# Patient Record
Sex: MALE | Race: WHITE | NOT HISPANIC OR LATINO | Employment: FULL TIME | URBAN - METROPOLITAN AREA
[De-identification: names, ages, dates, MRNs, and addresses within clinical notes are randomized per-mention and may not be internally consistent; named-entity substitution may affect disease eponyms.]

---

## 2017-01-06 ENCOUNTER — OFFICE VISIT (OUTPATIENT)
Dept: INTERNAL MEDICINE | Facility: CLINIC | Age: 62
End: 2017-01-06
Payer: COMMERCIAL

## 2017-01-06 ENCOUNTER — HOSPITAL ENCOUNTER (OUTPATIENT)
Dept: RADIOLOGY | Facility: HOSPITAL | Age: 62
Discharge: HOME | End: 2017-01-06
Attending: FAMILY MEDICINE
Payer: COMMERCIAL

## 2017-01-06 ENCOUNTER — TELEPHONE (OUTPATIENT)
Dept: INTERNAL MEDICINE | Facility: CLINIC | Age: 62
End: 2017-01-06

## 2017-01-06 VITALS
DIASTOLIC BLOOD PRESSURE: 88 MMHG | HEART RATE: 88 BPM | SYSTOLIC BLOOD PRESSURE: 136 MMHG | BODY MASS INDEX: 29.75 KG/M2 | HEIGHT: 71 IN | OXYGEN SATURATION: 98 % | WEIGHT: 212.5 LBS | TEMPERATURE: 97 F

## 2017-01-06 DIAGNOSIS — R22.32 MASS OF SKIN OF SHOULDER, LEFT: ICD-10-CM

## 2017-01-06 DIAGNOSIS — R22.32 MASS OF SKIN OF SHOULDER, LEFT: Primary | ICD-10-CM

## 2017-01-06 PROCEDURE — 3079F DIAST BP 80-89 MM HG: CPT | Mod: S$GLB,,, | Performed by: PHYSICIAN ASSISTANT

## 2017-01-06 PROCEDURE — 99213 OFFICE O/P EST LOW 20 MIN: CPT | Mod: S$GLB,,, | Performed by: PHYSICIAN ASSISTANT

## 2017-01-06 PROCEDURE — 1159F MED LIST DOCD IN RCRD: CPT | Mod: S$GLB,,, | Performed by: PHYSICIAN ASSISTANT

## 2017-01-06 PROCEDURE — 76999 ECHO EXAMINATION PROCEDURE: CPT | Mod: TC,PO

## 2017-01-06 PROCEDURE — 76882 US LMTD JT/FCL EVL NVASC XTR: CPT | Mod: 26,,, | Performed by: RADIOLOGY

## 2017-01-06 PROCEDURE — 3075F SYST BP GE 130 - 139MM HG: CPT | Mod: S$GLB,,, | Performed by: PHYSICIAN ASSISTANT

## 2017-01-06 PROCEDURE — 99999 PR PBB SHADOW E&M-EST. PATIENT-LVL IV: CPT | Mod: PBBFAC,,, | Performed by: PHYSICIAN ASSISTANT

## 2017-01-06 RX ORDER — BENZONATATE 200 MG/1
200 CAPSULE ORAL 3 TIMES DAILY PRN
COMMUNITY
End: 2017-03-09

## 2017-01-06 RX ORDER — CEFDINIR 300 MG/1
300 CAPSULE ORAL 2 TIMES DAILY
COMMUNITY
End: 2017-03-09

## 2017-01-06 NOTE — MR AVS SNAPSHOT
[facility]  [address] [place] [address]  Phone: [phone]  Fax: [phone]                  Kike Smith   2017 9:20 AM   Office Visit    Description:  Male : 1955   Provider:  Dayami Benton PA-C   Department:  [facility]           Reason for Visit     Shoulder Pain           Diagnoses this Visit        Comments    Mass of skin of shoulder, left    -  Primary            To Do List           Future Appointments        Provider Department Dept Phone    2017 2:45 PM [facility] [phone]    2017 8:10 AM LABORATORY, [facility] [phone]    3/9/2017 10:00 AM Anmol Reyna MD [facility] [phone]      Goals (5 Years of Data)     None      Follow-Up and Disposition     Return if symptoms worsen or fail to improve.      [facility]     [facility] Care [facility] -  Assistance  Registered nurses in the [facility] provide clinical advisement, health education, appointment booking, and other advisory services.  Call for this free service at [phone].             Medications           Message regarding Medications     Verify the changes and/or additions to your medication regime listed below are the same as discussed with your clinician today.  If any of these changes or additions are incorrect, please notify your healthcare provider.        STOP taking these medications     PATANASE 0.6 % Spry 2 sprays by Nasal route once daily.            Verify that the below list of medications is an accurate representation of the medications you are currently taking.  If none reported, the list may be blank. If incorrect, please contact your healthcare provider. Carry this list with you in case of emergency.           Current Medications     aspirin 81 MG Chew Take 81 mg by mouth once daily.    azelastine (ASTELIN) 137 mcg (0.1 %) nasal spray 2  "sprays 2 (two) times daily.    benzonatate (TESSALON) 200 MG capsule Take 200 mg by mouth 3 (three) times daily as needed for Cough.    cefdinir (OMNICEF) 300 MG capsule Take 300 mg by mouth 2 (two) times daily.    ciprofloxacin-dexamethasone 0.3-0.1% (CIPRODEX) 0.3-0.1 % DrpS Place 4 drops into the left ear 2 (two) times daily.    fexofenadine (ALLEGRA) 30 MG tablet Take 30 mg by mouth 2 (two) times daily.    fluticasone (FLONASE) 50 mcg/actuation nasal spray instill 2 sprays into each nostril once daily    hydrochlorothiazide (HYDRODIURIL) 25 MG tablet take 1 tablet by mouth once daily           Clinical Reference Information           Vital Signs - Last Recorded  Most recent update: 1/6/2017  9:06 AM by Shahram Lyman LPN    BP Pulse Temp Ht    136/88 (BP Location: Right arm, Patient Position: Sitting, BP Method: Manual) 88 97.3 °F (36.3 °C) (Tympanic) 5' 11" (1.803 m)    Wt SpO2 BMI    96.4 kg (212 lb 8.4 oz) 98% 29.64 kg/m2      Blood Pressure          Most Recent Value    BP  136/88      Allergies as of 1/6/2017     No Known Allergies      Immunizations Administered on Date of Encounter - 1/6/2017     None      Orders Placed During Today's Visit     Future Labs/Procedures Expected by Expires    [facility]  1/6/2017 1/6/2018      Instructions      Lipoma, No Treatment  A lipoma is a local overgrowth of fatty tissue. It appears as a soft raised area, usually less than 2 inches across. It is a benign condition (not cancer).   Home care  General information regarding lipoma includes:  1. No special care is needed for a lipoma.  2. You can consider removal for cosmetic reasons.  3. Sometimes lipomas are uncomfortable because they put pressure on surrounding tissues. This is also a reason to have a lipoma removed.  Follow-up care  Follow up with your healthcare provider, or as advised if you want to have the lipoma removed at a later time.  When to seek medical advice  Call your healthcare provider " right away if any of the following occur:  · Redness, pain, tenderness, or drainage from the lipoma  · Lipoma begins to enlarge or change shape  · Changes in the color of the skin over the lipoma  © 8308-6688 [employer]. [address] [place] [address]. All rights reserved. This information is not intended as a substitute for professional medical care. Always follow your healthcare professional's instructions.        Understanding a Lipoma    A lipoma is a benign lump under the skin thats made of fat. Its not cancer. It feels soft like rubber when you press it, and in most cases it doesnt hurt. Some people have more than one. A lipoma grows slowly over time and doesnt cause many problems. Lipomas occur most often in adults from ages 40 to 60, and more often in men.  How to say it  Ly-POH-sofiya   What causes a lipoma?  The cause is not yet known. Experts are still learning more. It may be partly caused by a problem in a gene. They can run in families. Familial multiple lipomatosis is when 2 or more family members have many lipomas.  Symptoms of a lipoma  The main symptom of a lipoma is a soft lump under the skin that doesnt hurt unless it is pressing on a nerve. It may be small, around 1/4 inch across. Or it may be larger, up to 4 inches across or more.  There are different kinds of lipomas. The most common kind occurs under the skin of the shoulders, chest, back, belly, or under the arms. In some cases, a lipoma can occur on the legs. In rare cases, one may occur deeper in the body or in a muscle.  Treatment for a lipoma  In most cases, a lipoma doesnt need treatment. Your healthcare provider may look at it during regular checkups to see if it changes.  But if the lipoma is painful or you want it removed for cosmetic reasons, it can be removed with surgery. The surgery is called excision. The lipoma will most likely not grow back after surgery. During surgery, the area around the lipoma  is numbed. If you have a deep lipoma, you may need medicine called regional anesthesia to numb a larger area. Or you may need medicine called general anesthesia to put you to sleep during the procedure. Then the doctor makes a cut over the area of the lipoma. He or she removes the lump of fat. The cut is then closed with stitches.  Possible complications of a lipoma  A large lipoma inside the body can press on organs, nerves, or other tissues and cause problems. For example, it can cause problems with breathing or digestion.  Living with a lipoma  Your healthcare provider may look at the lipoma during regular checkups to see if it changes or is causing problems.  When to call your healthcare provider  Call your healthcare provider right away if you have any of these:  · Lipoma that grows quickly, causes pain, or feels hard  · Growth of new lipomas   © 0031-1131 [employer]. [address] [place] [address], [place] [address]. All rights reserved. This information is not intended as a substitute for professional medical care. Always follow your healthcare professional's instructions.

## 2017-01-06 NOTE — TELEPHONE ENCOUNTER
----- Message from Yvonne Gomez sent at 1/6/2017  4:15 PM CST -----  u/s results needed..[phone] (home)

## 2017-01-06 NOTE — PATIENT INSTRUCTIONS
Lipoma, No Treatment  A lipoma is a local overgrowth of fatty tissue. It appears as a soft raised area, usually less than 2 inches across. It is a benign condition (not cancer).   Home care  General information regarding lipoma includes:  1. No special care is needed for a lipoma.  2. You can consider removal for cosmetic reasons.  3. Sometimes lipomas are uncomfortable because they put pressure on surrounding tissues. This is also a reason to have a lipoma removed.  Follow-up care  Follow up with your healthcare provider, or as advised if you want to have the lipoma removed at a later time.  When to seek medical advice  Call your healthcare provider right away if any of the following occur:  · Redness, pain, tenderness, or drainage from the lipoma  · Lipoma begins to enlarge or change shape  · Changes in the color of the skin over the lipoma  © 2647-9082 [employer]. [address] [place] [address]. All rights reserved. This information is not intended as a substitute for professional medical care. Always follow your healthcare professional's instructions.        Understanding a Lipoma    A lipoma is a benign lump under the skin thats made of fat. Its not cancer. It feels soft like rubber when you press it, and in most cases it doesnt hurt. Some people have more than one. A lipoma grows slowly over time and doesnt cause many problems. Lipomas occur most often in adults from ages 40 to 60, and more often in men.  How to say it  Ly-POH-sofiya   What causes a lipoma?  The cause is not yet known. Experts are still learning more. It may be partly caused by a problem in a gene. They can run in families. Familial multiple lipomatosis is when 2 or more family members have many lipomas.  Symptoms of a lipoma  The main symptom of a lipoma is a soft lump under the skin that doesnt hurt unless it is pressing on a nerve. It may be small, around 1/4 inch across. Or it may be larger, up to 4 inches  across or more.  There are different kinds of lipomas. The most common kind occurs under the skin of the shoulders, chest, back, belly, or under the arms. In some cases, a lipoma can occur on the legs. In rare cases, one may occur deeper in the body or in a muscle.  Treatment for a lipoma  In most cases, a lipoma doesnt need treatment. Your healthcare provider may look at it during regular checkups to see if it changes.  But if the lipoma is painful or you want it removed for cosmetic reasons, it can be removed with surgery. The surgery is called excision. The lipoma will most likely not grow back after surgery. During surgery, the area around the lipoma is numbed. If you have a deep lipoma, you may need medicine called regional anesthesia to numb a larger area. Or you may need medicine called general anesthesia to put you to sleep during the procedure. Then the doctor makes a cut over the area of the lipoma. He or she removes the lump of fat. The cut is then closed with stitches.  Possible complications of a lipoma  A large lipoma inside the body can press on organs, nerves, or other tissues and cause problems. For example, it can cause problems with breathing or digestion.  Living with a lipoma  Your healthcare provider may look at the lipoma during regular checkups to see if it changes or is causing problems.  When to call your healthcare provider  Call your healthcare provider right away if you have any of these:  · Lipoma that grows quickly, causes pain, or feels hard  · Growth of new lipomas   © 9797-5732 [employer]. [address] [place] [address], [place] [address]. All rights reserved. This information is not intended as a substitute for professional medical care. Always follow your healthcare professional's instructions.

## 2017-01-06 NOTE — PROGRESS NOTES
"  Subjective:      Patient ID: Kike Smith is a 61 y.o. male.    Chief Complaint: Shoulder Pain (Left shoulder with swelling on shoulder bone (movable with touch))    Head and Neck Mass:   Chronicity:  New  Onset:  Yesterday  Progression since onset:  Unchanged  Mass characteristics:  Soft and mobileNo sore throat, no chills, no ear pain, no fever, no headaches, no dysphasia, no hemoptysis, no myalgias, no nasal congestion, no postnasal drip, no hoarse voice, no immunosuppression, no shortness of breath, no sore throat, no sweats, no weight loss and no infection.Aggravated by:  Nothing  Treatments tried:  NothingNo asthma, no bronchiectasis, no bronchitis, no COPD, no emphysema, no environmental allergies and no pneumonia.      Review of Systems   Constitutional: Negative for chills, fever and weight loss.   HENT: Negative for ear pain, hoarse voice, postnasal drip and sore throat.    Respiratory: Negative for hemoptysis and shortness of breath.    Musculoskeletal: Negative for myalgias.   Allergic/Immunologic: Negative for environmental allergies.   Neurological: Negative for headaches.     Objective:     Visit Vitals    /88 (BP Location: Right arm, Patient Position: Sitting, BP Method: Manual)    Pulse 88    Temp 97.3 °F (36.3 °C) (Tympanic)    Ht 5' 11" (1.803 m)    Wt 96.4 kg (212 lb 8.4 oz)    SpO2 98%    BMI 29.64 kg/m2       Physical Exam   Constitutional: He appears well-developed and well-nourished. No distress.   Cardiovascular: Normal rate, regular rhythm and normal heart sounds.  Exam reveals no gallop and no friction rub.    No murmur heard.  Pulmonary/Chest: Effort normal and breath sounds normal. No respiratory distress. He has no wheezes. He has no rales.   Musculoskeletal:        Left shoulder: He exhibits normal range of motion, no tenderness, no bony tenderness, no swelling, no effusion, no crepitus, no deformity, no laceration, no pain, no spasm, normal pulse and normal strength. "        Cervical back: Normal. He exhibits normal range of motion, no tenderness, no bony tenderness and no swelling.        Arms:  Skin: Skin is warm. No rash noted. He is not diaphoretic. No erythema.   Psychiatric: He has a normal mood and affect. His behavior is normal. Judgment and thought content normal.   Nursing note and vitals reviewed.      Assessment:     1. Mass of skin of shoulder, left      Plan:   Mass of skin of shoulder, left  -     US Soft Tissue Misc; Future; Expected date: 1/6/17    -MOST LIKELY A LIPOMA. A handout on this was sent home with patient today.     Return if symptoms worsen or fail to improve.

## 2017-01-06 NOTE — TELEPHONE ENCOUNTER
Notified patient of results, medication, and recommendations. Patient verbalized understanding.//ddw

## 2017-02-20 ENCOUNTER — LAB VISIT (OUTPATIENT)
Dept: LAB | Facility: HOSPITAL | Age: 62
End: 2017-02-20
Attending: FAMILY MEDICINE
Payer: COMMERCIAL

## 2017-02-20 DIAGNOSIS — I10 ESSENTIAL HYPERTENSION: ICD-10-CM

## 2017-02-20 DIAGNOSIS — Z12.5 SCREENING FOR PROSTATE CANCER: ICD-10-CM

## 2017-02-20 LAB
ANION GAP SERPL CALC-SCNC: 4 MMOL/L
BUN SERPL-MCNC: 23 MG/DL
CALCIUM SERPL-MCNC: 9.3 MG/DL
CHLORIDE SERPL-SCNC: 103 MMOL/L
CHOLEST/HDLC SERPL: 4.8 {RATIO}
CO2 SERPL-SCNC: 32 MMOL/L
COMPLEXED PSA SERPL-MCNC: 3.7 NG/ML
CREAT SERPL-MCNC: 1 MG/DL
EST. GFR  (AFRICAN AMERICAN): >60 ML/MIN/1.73 M^2
EST. GFR  (NON AFRICAN AMERICAN): >60 ML/MIN/1.73 M^2
GLUCOSE SERPL-MCNC: 86 MG/DL
HDL/CHOLESTEROL RATIO: 20.8 %
HDLC SERPL-MCNC: 178 MG/DL
HDLC SERPL-MCNC: 37 MG/DL
LDLC SERPL CALC-MCNC: 127.4 MG/DL
NONHDLC SERPL-MCNC: 141 MG/DL
POTASSIUM SERPL-SCNC: 4.2 MMOL/L
SODIUM SERPL-SCNC: 139 MMOL/L
TRIGL SERPL-MCNC: 68 MG/DL

## 2017-02-20 PROCEDURE — 84153 ASSAY OF PSA TOTAL: CPT

## 2017-02-20 PROCEDURE — 36415 COLL VENOUS BLD VENIPUNCTURE: CPT | Mod: PO

## 2017-02-20 PROCEDURE — 80061 LIPID PANEL: CPT

## 2017-02-20 PROCEDURE — 80048 BASIC METABOLIC PNL TOTAL CA: CPT

## 2017-03-09 ENCOUNTER — OFFICE VISIT (OUTPATIENT)
Dept: INTERNAL MEDICINE | Facility: CLINIC | Age: 62
End: 2017-03-09
Payer: COMMERCIAL

## 2017-03-09 VITALS
HEART RATE: 67 BPM | BODY MASS INDEX: 29.91 KG/M2 | WEIGHT: 213.63 LBS | DIASTOLIC BLOOD PRESSURE: 86 MMHG | HEIGHT: 71 IN | OXYGEN SATURATION: 99 % | SYSTOLIC BLOOD PRESSURE: 122 MMHG | TEMPERATURE: 97 F

## 2017-03-09 DIAGNOSIS — Z00.00 ROUTINE HEALTH MAINTENANCE: Primary | ICD-10-CM

## 2017-03-09 DIAGNOSIS — R31.9 HEMATURIA: ICD-10-CM

## 2017-03-09 DIAGNOSIS — Z12.5 SCREENING FOR PROSTATE CANCER: ICD-10-CM

## 2017-03-09 DIAGNOSIS — I10 ESSENTIAL HYPERTENSION: ICD-10-CM

## 2017-03-09 PROCEDURE — 90471 IMMUNIZATION ADMIN: CPT | Mod: S$GLB,,, | Performed by: FAMILY MEDICINE

## 2017-03-09 PROCEDURE — 3079F DIAST BP 80-89 MM HG: CPT | Mod: S$GLB,,, | Performed by: FAMILY MEDICINE

## 2017-03-09 PROCEDURE — 90736 HZV VACCINE LIVE SUBQ: CPT | Mod: S$GLB,,, | Performed by: FAMILY MEDICINE

## 2017-03-09 PROCEDURE — 99396 PREV VISIT EST AGE 40-64: CPT | Mod: 25,S$GLB,, | Performed by: FAMILY MEDICINE

## 2017-03-09 PROCEDURE — 3074F SYST BP LT 130 MM HG: CPT | Mod: S$GLB,,, | Performed by: FAMILY MEDICINE

## 2017-03-09 PROCEDURE — 99999 PR PBB SHADOW E&M-EST. PATIENT-LVL III: CPT | Mod: PBBFAC,,, | Performed by: FAMILY MEDICINE

## 2017-03-09 RX ORDER — HYDROCHLOROTHIAZIDE 25 MG/1
25 TABLET ORAL DAILY
Qty: 90 TABLET | Refills: 3 | Status: SHIPPED | OUTPATIENT
Start: 2017-03-09 | End: 2018-05-18 | Stop reason: SDUPTHER

## 2017-03-09 NOTE — MR AVS SNAPSHOT
[facility]  [address]  [place] [address]  Phone: [phone]  Fax: [phone]                  Kike Smith   3/9/2017 10:00 AM   Office Visit    Description:  Male : 1955   Provider:  Anmol Reyna MD   Department:  [facility]           Diagnoses this Visit        Comments    Routine health maintenance    -  Primary     Essential hypertension         Screening for prostate cancer         Hematuria                To Do List           Future Appointments        Provider Department Dept Phone    2017 9:30 AM LAB[facility]ATORY, [facility] [phone]    2017 9:40 AM SPECIMEN, [facility] [phone]    3/2/2018 7:30 AM LAB[facility]ATORY, [facility] [phone]    3/2/2018 7:40 AM SPECIMEN, [facility] [phone]      Goals (5 Years of Data)     None       These Medications        Disp Refills Start End    hydrochlorothiazide (HYDRODIURIL) 25 MG tablet 90 tablet 3 3/9/2017     Take 1 tablet (25 mg total) by mouth once daily. - Oral    Pharmacy: [facility]  DANIELLE VILLANUEVA [facility]  #: [phone]         [facility]     [facility] -  Assistance  Registered nurses in the [facility] provide clinical advisement, health education, appointment booking, and other advisory services.  Call for this free service at [phone].             Medications           Message regarding Medications     Verify the changes and/or additions to your medication regime listed below are the same as discussed with your clinician today.  If any of these changes or additions are incorrect, please notify your healthcare provider.        CHANGE how you are taking these medications     Start Taking Instead of    hydrochlorothiazide (HYDRODIURIL) 25 MG tablet hydrochlorothiazide (HYDRODIURIL) 25 MG tablet     "Dosage:  Take 1 tablet (25 mg total) by mouth once daily. Dosage:  take 1 tablet by mouth once daily    Reason for Change:  Reorder       STOP taking these medications     ciprofloxacin-dexamethasone 0.3-0.1% (CIPRODEX) 0.3-0.1 % DrpS Place 4 drops into the left ear 2 (two) times daily.    benzonatate (TESSALON) 200 MG capsule Take 200 mg by mouth 3 (three) times daily as needed for Cough.    cefdinir (OMNICEF) 300 MG capsule Take 300 mg by mouth 2 (two) times daily.           Verify that the below list of medications is an accurate representation of the medications you are currently taking.  If none reported, the list may be blank. If incorrect, please contact your healthcare provider. Carry this list with you in case of emergency.           Current Medications     aspirin 81 MG Chew Take 81 mg by mouth once daily.    azelastine (ASTELIN) 137 mcg (0.1 %) nasal spray 2 sprays 2 (two) times daily.    fexofenadine (ALLEGRA) 30 MG tablet Take 30 mg by mouth 2 (two) times daily.    fluticasone (FLONASE) 50 mcg/actuation nasal spray instill 2 sprays into each nostril once daily    hydrochlorothiazide (HYDRODIURIL) 25 MG tablet Take 1 tablet (25 mg total) by mouth once daily.           Clinical Reference Information           Your Vitals Were     BP Pulse Temp Height    122/86 (BP Location: Right arm, Patient Position: Sitting, BP Method: Manual) 67 97.2 °F (36.2 °C) (Tympanic) 5' 10.5" (1.791 m)    Weight SpO2 BMI    96.9 kg (213 lb 10 oz) 99% 30.22 kg/m2      Blood Pressure          Most Recent Value    BP  122/86      Allergies as of 3/9/2017     No Known Allergies      Immunizations Administered on Date of Encounter - 3/9/2017     Name Date Dose VIS Date Route    Zoster  Incomplete 0.65 mL 10/6/2009 Subcutaneous      Orders Placed During Today's Visit      Normal Orders This Visit    Zoster Vaccine - Live     Future Labs/Procedures Expected by Expires    PSA, Screening  9/5/2017 3/9/2018    Urinalysis  9/5/2017 " 5/8/2018    Basic metabolic panel  3/9/2018 3/10/2018    Lipid panel  3/9/2018 3/9/2018    PSA, Screening  3/9/2018 3/10/2018    Urinalysis  3/9/2018 5/8/2018      Language Assistance Services     ATTENTION: Language assistance services are available, free of charge. Please call [phone].      ATENCIÓN: Si habla español, tiene a ashley disposición servicios gratuitos de asistencia lingüística. Llame al [phone].     CHÚ Ý: N?u b?n nói Ti?ng Vi?t, có các d?ch v? h? tr? ngôn ng? mi?n phí dành cho b?n. G?i s? [phone].         [facility] complies with applicable Federal civil rights laws and does not discriminate on the basis of race, color, national origin, age, disability, or sex.

## 2017-03-09 NOTE — PROGRESS NOTES
Subjective:       Patient ID: Kike Smith is a  male.    Chief Complaint: Annual Exam    HPIhere for phys exam no c/o;htn controlled and nl bps;d/w psa bit up again (had sex w/in a day of) hx intermitt borderline elev in past; asympt no bph symp    fam hx bladder ca/hematuria nl w./u(brother bladder ca /smoker) : nl hmaturia w/u yrs ago. Would like to monitor for inc hematuria. No gross hematuria    Past Medical History   Diagnosis Date    Hypertension     Benign hematuria      s/p urol/cysto     History reviewed. No pertinent past surgical history.  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         Review of Systems  Cardiovascular: no chest pain  Chest: no shortness of breath  Abd: no abd pain  Remainder review of systems negative    Objective:      Physical Exam   Constitutional: He is oriented to person, place, and time. He appears well-developed and well-nourished.   HENT:   Head: Normocephalic and atraumatic.   Right Ear: External ear normal.   Left Ear: External ear normal.   Nose: Nose normal.   Mouth/Throat: Oropharynx is clear and moist.   Nl tms   Eyes: Conjunctivae and EOM are normal. Pupils are equal, round, and reactive to light. No scleral icterus.   Neck: Normal range of motion. Neck supple. Carotid bruit is not present.   Cardiovascular: Normal rate, regular rhythm and normal heart sounds.  Exam reveals no gallop and no friction rub.    No murmur  heard.  Pulmonary/Chest: Effort normal and breath sounds normal. He has no wheezes.   Abdominal: Soft. Bowel sounds are normal. He exhibits no distension and no mass. There is no hepatosplenomegaly. There is no tenderness. There is no rebound and no guarding.   Musculoskeletal: Normal range of motion. He exhibits no tenderness.   Lymphadenopathy:     He has no cervical adenopathy.   Neurological: He is alert and oriented to person, place, and time. No cranial nerve deficit. Coordination normal.   Skin: Skin is warm and dry. No rash noted. No erythema.   Psychiatric: He has a normal mood and affect. His behavior is normal. Judgment and thought content normal.   Nursing note and vitals reviewed.  gu nl test. No hernia    haider prost mild enlarg no tend/boggin; no nodules  Assessment:       1. Routine health maintenance      elev psa veloc  fam hx bladder ca/hematuria nl w./u  Plan:       psa ua 6m   Lab 12 months and follow up after  Routine health maintenance    Essential hypertension  -     Lipid panel; Future; Expected date: 3/9/18  -     Basic metabolic panel; Future; Expected date: 3/9/18    Screening for prostate cancer  -     PSA, Screening; Future; Expected date: 3/9/18  -     PSA, Screening; Future; Expected date: 9/5/17    Hematuria  -     Urinalysis; Future; Expected date: 9/5/17  -     Urinalysis; Future; Expected date: 3/9/18    Other orders  -     hydrochlorothiazide (HYDRODIURIL) 25 MG tablet; Take 1 tablet (25 mg total) by mouth once daily.  Dispense: 90 tablet; Refill: 3  -     Zoster Vaccine - Live

## 2017-03-24 NOTE — TELEPHONE ENCOUNTER
----- Message from Patricia Welch sent at 3/24/2017 11:01 AM CDT -----  Contact: pt  Patient needs a refill on ciprodex otic suspenion/ear drop called into [facility] pharmacy at [facility].  Please call patient at [phone], if you have any questions. Thanks!

## 2017-03-27 RX ORDER — CIPROFLOXACIN AND DEXAMETHASONE 3; 1 MG/ML; MG/ML
SUSPENSION/ DROPS AURICULAR (OTIC)
Qty: 7.5 ML | Refills: 5
Start: 2017-03-27

## 2017-03-27 RX ORDER — CIPROFLOXACIN AND DEXAMETHASONE 3; 1 MG/ML; MG/ML
4 SUSPENSION/ DROPS AURICULAR (OTIC) 2 TIMES DAILY
Qty: 7.5 ML | Refills: 5
Start: 2017-03-27

## 2017-07-03 ENCOUNTER — OFFICE VISIT (OUTPATIENT)
Dept: INTERNAL MEDICINE | Facility: CLINIC | Age: 62
End: 2017-07-03
Payer: COMMERCIAL

## 2017-07-03 VITALS
HEART RATE: 73 BPM | HEIGHT: 71 IN | WEIGHT: 213.38 LBS | OXYGEN SATURATION: 95 % | DIASTOLIC BLOOD PRESSURE: 72 MMHG | TEMPERATURE: 98 F | SYSTOLIC BLOOD PRESSURE: 120 MMHG | BODY MASS INDEX: 29.87 KG/M2

## 2017-07-03 DIAGNOSIS — N45.1 EPIDIDYMITIS: Primary | ICD-10-CM

## 2017-07-03 PROCEDURE — 99214 OFFICE O/P EST MOD 30 MIN: CPT | Mod: S$GLB,,, | Performed by: PEDIATRICS

## 2017-07-03 PROCEDURE — 99999 PR PBB SHADOW E&M-EST. PATIENT-LVL III: CPT | Mod: PBBFAC,,, | Performed by: PEDIATRICS

## 2017-07-03 RX ORDER — DOXYCYCLINE 100 MG/1
100 CAPSULE ORAL 2 TIMES DAILY
Qty: 20 CAPSULE | Refills: 0 | Status: SHIPPED | OUTPATIENT
Start: 2017-07-03 | End: 2017-07-13

## 2017-07-03 NOTE — PROGRESS NOTES
Subjective:       Patient ID: Kike Smith is a 61 y.o. male.    Chief Complaint: Testicle Pain (ache)    Intermittent with testicular pain(more as ache) and swelling. Last month it has been symptomatic. He can sometimes feel right testicular mass, now can. US many years ago showed epididymal cyst. No dysuria, drip, urinary frequency, color. Has hx of followed PSA and urine.      Testicle Pain   The patient's primary symptoms include testicular pain. The patient's pertinent negatives include no penile pain or scrotal swelling. Pertinent negatives include no chest pain, constipation, diarrhea, dysuria, flank pain, frequency, headaches, urgency or vomiting.     Review of Systems   Constitutional: Negative for activity change and unexpected weight change.   HENT: Negative for hearing loss, rhinorrhea and trouble swallowing.    Eyes: Negative for discharge and visual disturbance.   Respiratory: Negative for chest tightness and wheezing.    Cardiovascular: Negative for chest pain and palpitations.   Gastrointestinal: Negative for blood in stool, constipation, diarrhea and vomiting.   Endocrine: Negative for polydipsia and polyuria.   Genitourinary: Positive for testicular pain. Negative for difficulty urinating, dysuria, flank pain, frequency, genital sores, hematuria, penile pain, penile swelling, scrotal swelling and urgency.   Musculoskeletal: Negative for arthralgias, joint swelling and neck pain.   Neurological: Negative for weakness and headaches.   Psychiatric/Behavioral: Negative for confusion and dysphoric mood.       Objective:      Physical Exam   Constitutional: He is oriented to person, place, and time. He appears well-developed and well-nourished. No distress.   Cardiovascular: Normal rate, regular rhythm and normal heart sounds.    No murmur heard.  Pulmonary/Chest: Effort normal and breath sounds normal. No respiratory distress. He has no wheezes. He has no rales.   Abdominal: Soft. He exhibits no  distension and no mass. There is no tenderness. There is no rebound and no guarding.   Genitourinary: Penis normal. No penile tenderness (both testicles normal, swelling of right epidydimus, no descrete mass, minimal tenderness.).   Musculoskeletal: He exhibits no edema.   Neurological: He is alert and oriented to person, place, and time. No cranial nerve deficit. Coordination normal.   Psychiatric: He has a normal mood and affect. His behavior is normal. Judgment and thought content normal.       Assessment:       1. Epididymitis        Plan:       Epididymitis  -     Urinalysis; Future; Expected date: 07/03/2017  -     Urine culture; Future; Expected date: 07/03/2017  -     US Scrotum And Testicles; Future; Expected date: 07/03/2017  -     doxycycline (MONODOX) 100 MG capsule; Take 1 capsule (100 mg total) by mouth 2 (two) times daily.  Dispense: 20 capsule; Refill: 0    he has mild asymmetry of right and left side. He is going out of town, will start doxy and get urine and us. F/U based on finding.

## 2017-07-07 ENCOUNTER — TELEPHONE (OUTPATIENT)
Dept: RADIOLOGY | Facility: HOSPITAL | Age: 62
End: 2017-07-07

## 2017-07-10 ENCOUNTER — HOSPITAL ENCOUNTER (OUTPATIENT)
Dept: RADIOLOGY | Facility: HOSPITAL | Age: 62
Discharge: HOME | End: 2017-07-10
Attending: PEDIATRICS
Payer: COMMERCIAL

## 2017-07-10 ENCOUNTER — PATIENT MESSAGE (OUTPATIENT)
Dept: INTERNAL MEDICINE | Facility: CLINIC | Age: 62
End: 2017-07-10

## 2017-07-10 DIAGNOSIS — N45.1 EPIDIDYMITIS: ICD-10-CM

## 2017-07-10 PROCEDURE — 76870 US EXAM SCROTUM: CPT | Mod: 26,,, | Performed by: RADIOLOGY

## 2017-07-10 PROCEDURE — 76870 US EXAM SCROTUM: CPT | Mod: TC,PO

## 2017-09-20 ENCOUNTER — LAB VISIT (OUTPATIENT)
Dept: LAB | Facility: HOSPITAL | Age: 62
End: 2017-09-20
Attending: FAMILY MEDICINE
Payer: COMMERCIAL

## 2017-09-20 DIAGNOSIS — R31.9 HEMATURIA: ICD-10-CM

## 2017-09-20 LAB
BILIRUB UR QL STRIP: NEGATIVE
CLARITY UR: CLEAR
COLOR UR: YELLOW
GLUCOSE UR QL STRIP: NEGATIVE
HGB UR QL STRIP: ABNORMAL
KETONES UR QL STRIP: NEGATIVE
LEUKOCYTE ESTERASE UR QL STRIP: NEGATIVE
MICROSCOPIC COMMENT: ABNORMAL
NITRITE UR QL STRIP: NEGATIVE
PH UR STRIP: 8 [PH] (ref 5–8)
PROT UR QL STRIP: ABNORMAL
RBC #/AREA URNS HPF: 15 /HPF (ref 0–4)
SP GR UR STRIP: 1.01 (ref 1–1.03)
URN SPEC COLLECT METH UR: ABNORMAL

## 2017-09-20 PROCEDURE — 81000 URINALYSIS NONAUTO W/SCOPE: CPT | Mod: PO

## 2017-09-21 ENCOUNTER — PATIENT MESSAGE (OUTPATIENT)
Dept: INTERNAL MEDICINE | Facility: CLINIC | Age: 62
End: 2017-09-21

## 2017-11-02 ENCOUNTER — LAB VISIT (OUTPATIENT)
Dept: LAB | Facility: HOSPITAL | Age: 62
End: 2017-11-02
Attending: PEDIATRICS
Payer: COMMERCIAL

## 2017-11-02 ENCOUNTER — OFFICE VISIT (OUTPATIENT)
Dept: OPHTHALMOLOGY | Facility: CLINIC | Age: 62
End: 2017-11-02
Payer: COMMERCIAL

## 2017-11-02 ENCOUNTER — OFFICE VISIT (OUTPATIENT)
Dept: INTERNAL MEDICINE | Facility: CLINIC | Age: 62
End: 2017-11-02
Payer: COMMERCIAL

## 2017-11-02 VITALS
WEIGHT: 210.31 LBS | TEMPERATURE: 98 F | HEIGHT: 71 IN | DIASTOLIC BLOOD PRESSURE: 84 MMHG | SYSTOLIC BLOOD PRESSURE: 110 MMHG | OXYGEN SATURATION: 97 % | HEART RATE: 76 BPM | BODY MASS INDEX: 29.44 KG/M2

## 2017-11-02 DIAGNOSIS — N50.3 EPIDIDYMAL CYST: ICD-10-CM

## 2017-11-02 DIAGNOSIS — H52.7 REFRACTIVE ERROR: ICD-10-CM

## 2017-11-02 DIAGNOSIS — Z13.5 GLAUCOMA SCREENING: ICD-10-CM

## 2017-11-02 DIAGNOSIS — Z01.00 VISIT FOR EYE AND VISION EXAM: Primary | ICD-10-CM

## 2017-11-02 PROCEDURE — 92014 COMPRE OPH EXAM EST PT 1/>: CPT | Mod: S$GLB,,, | Performed by: OPTOMETRIST

## 2017-11-02 PROCEDURE — 99999 PR PBB SHADOW E&M-EST. PATIENT-LVL III: CPT | Mod: PBBFAC,,, | Performed by: PEDIATRICS

## 2017-11-02 PROCEDURE — 99213 OFFICE O/P EST LOW 20 MIN: CPT | Mod: S$GLB,,, | Performed by: PEDIATRICS

## 2017-11-02 PROCEDURE — 92015 DETERMINE REFRACTIVE STATE: CPT | Mod: S$GLB,,, | Performed by: OPTOMETRIST

## 2017-11-02 PROCEDURE — 99999 PR PBB SHADOW E&M-EST. PATIENT-LVL I: CPT | Mod: PBBFAC,,, | Performed by: OPTOMETRIST

## 2017-11-02 PROCEDURE — 87591 N.GONORRHOEAE DNA AMP PROB: CPT

## 2017-11-02 RX ORDER — DOXYCYCLINE 100 MG/1
100 CAPSULE ORAL 2 TIMES DAILY
Qty: 42 CAPSULE | Refills: 0 | Status: SHIPPED | OUTPATIENT
Start: 2017-11-02 | End: 2017-11-23

## 2017-11-02 RX ORDER — CIPROFLOXACIN AND DEXAMETHASONE 3; 1 MG/ML; MG/ML
SUSPENSION/ DROPS AURICULAR (OTIC)
Refills: 0 | COMMUNITY
Start: 2017-09-28 | End: 2018-06-28

## 2017-11-02 NOTE — PROGRESS NOTES
HPI     Last [facility] exam 09/17/2015  Hx of Posterior vitreous detachment, OD  Hx of floaters in visual field, OD  Screening for glaucoma  RE  Update eyeglass Rx  Does not wear CL's anymore      Last edited by Fredy Eric MA on 11/2/2017  8:56 AM. (History)            Assessment /Plan     For exam results, see Encounter Report.    Visit for eye and vision exam    Glaucoma screening    Refractive error      OH OK OU.  Spec Rx given.  RTC one year.

## 2017-11-02 NOTE — PROGRESS NOTES
Subjective:       Patient ID: Kike Smith is a 62 y.o. male.    Chief Complaint: Testicle Pain    In July, doxy took care of groin pain. Returned about 2 weeks ago at time of constipation. Right groin, vague and intermittent. No fever, CVA tenderness, dysuria, dribbling, hematuria(has hx of hematuria with negative CT and cystoscope and family bladder cancer). No infidelities. W/U in past reviewed with patient.      Review of Systems   Constitutional: Negative for fever and unexpected weight change.   HENT: Negative for congestion and rhinorrhea.    Eyes: Negative for discharge and redness.   Respiratory: Negative for cough and wheezing.    Cardiovascular: Negative for chest pain, palpitations and leg swelling.   Gastrointestinal: Negative for abdominal pain, anal bleeding, constipation, diarrhea, nausea and vomiting.   Endocrine: Negative for cold intolerance, heat intolerance, polydipsia, polyphagia and polyuria.   Genitourinary: Positive for testicular pain. Negative for decreased urine volume, difficulty urinating, discharge, dysuria, enuresis, flank pain, frequency, genital sores, hematuria, penile pain, penile swelling, scrotal swelling and urgency.   Musculoskeletal: Negative for arthralgias and joint swelling.   Skin: Negative for rash and wound.   Neurological: Negative for syncope and headaches.   Psychiatric/Behavioral: Negative for behavioral problems and sleep disturbance.       Objective:      Physical Exam   Constitutional: He is oriented to person, place, and time. He appears well-developed and well-nourished. No distress.   Abdominal: Soft. He exhibits no distension and no mass. There is no tenderness. There is no rebound and no guarding.   Genitourinary: Penis normal.   Genitourinary Comments: No hernia, no testicular tenderness, small right cyst palpated.   Neurological: He is alert and oriented to person, place, and time. No cranial nerve deficit. Coordination normal.   Psychiatric: He has a  normal mood and affect. His behavior is normal. Judgment and thought content normal.       Assessment:       1. Epididymal cyst        Plan:       Epididymal cyst  -     Urinalysis; Future; Expected date: 11/02/2017  -     Urine culture; Future; Expected date: 11/02/2017  -     C. trachomatis/N. gonorrhoeae by AMP DNA Urine; Future; Expected date: 11/16/2017  -     Ambulatory referral to Urology    Other orders  -     doxycycline (VIBRAMYCIN) 100 MG Cap; Take 1 capsule (100 mg total) by mouth 2 (two) times daily.  Dispense: 42 capsule; Refill: 0

## 2017-11-03 LAB
C TRACH DNA SPEC QL NAA+PROBE: NOT DETECTED
N GONORRHOEA DNA SPEC QL NAA+PROBE: NOT DETECTED

## 2017-12-04 ENCOUNTER — TELEPHONE (OUTPATIENT)
Dept: UROLOGY | Facility: CLINIC | Age: 62
End: 2017-12-04

## 2017-12-04 ENCOUNTER — OFFICE VISIT (OUTPATIENT)
Dept: UROLOGY | Facility: CLINIC | Age: 62
End: 2017-12-04
Payer: COMMERCIAL

## 2017-12-04 VITALS — WEIGHT: 210 LBS | SYSTOLIC BLOOD PRESSURE: 128 MMHG | DIASTOLIC BLOOD PRESSURE: 74 MMHG | BODY MASS INDEX: 29.71 KG/M2

## 2017-12-04 DIAGNOSIS — N50.819 TESTALGIA: Primary | ICD-10-CM

## 2017-12-04 PROCEDURE — 99999 PR PBB SHADOW E&M-EST. PATIENT-LVL I: CPT | Mod: PBBFAC,,, | Performed by: UROLOGY

## 2017-12-04 PROCEDURE — 99244 OFF/OP CNSLTJ NEW/EST MOD 40: CPT | Mod: S$GLB,,, | Performed by: UROLOGY

## 2017-12-04 NOTE — LETTER
December 4, 2017      DANIEL Castro Jr., MD  [address] [facility] [address]           [facility]  [address] [place] [address]  [place] [address]  Phone: [phone]  Fax: [phone]          Patient: Kike Smith   MR Number: [id number]   YOB: 1955   Date of Visit: 12/4/2017       Dear Dr. DANIEL Castro Jr.:    Thank you for referring Kike Smith to me for evaluation. Attached you will find relevant portions of my assessment and plan of care.    If you have questions, please do not hesitate to call me. I look forward to following Kike Smith along with you.    Sincerely,    David Hobson IV, MD    Enclosure  CC:  No Recipients    If you would like to receive this communication electronically, please contact [email or web address] or [phone] to request more information on "[employer]" Link access.    For providers and/or their staff who would like to refer a patient to [facility], please contact us through our one-stop-shop provider referral line, [facility], at [phone].    If you feel you have received this communication in error or would no longer like to receive these types of communications, please e-mail [email or web address]

## 2017-12-04 NOTE — PROGRESS NOTES
Chief Complaint: Epididymal cyst?    HPI:   12/4/17: 61 yo man had a pain arise in the right testicle like an ache this summer.  Was treated on presumption of orchitis and pretty much went away.  Repeated last month same story.  Scrotal US 7/17 shows some very small inconsequential epididymal cysts. Strained his back lately and that was very painful and couldn't feel any testicle problems during that episode.  Has a mild right testicular discomfort when he thinks about it. Might go days without thinking about it. No abd/pelvic pain and no exac/rel factors.  No hematuria.  No urolithiasis.  No urinary bother.  Past history of idiopathic microhematuria, none today.  Normal sexual function.  Brother had bladder cancer treated age 62; has a neobladder.  Referred by Dr. Castro.    Allergies:  Patient has no known allergies.    Medications:  has a current medication list which includes the following prescription(s): aspirin, azelastine, ciprodex, fexofenadine, fluticasone, and hydrochlorothiazide.    Review of Systems:  General: No fever, chills, fatigability, or weight loss.  Skin: No rashes, itching, or changes in color or texture of skin.  Chest: Denies RANIES, cyanosis, wheezing, cough, and sputum production.  Abdomen: Appetite fine. No weight loss. Denies diarrhea, abdominal pain, hematemesis, or blood in stool.  Musculoskeletal: No joint stiffness or swelling. Denies back pain.  : As above.  All other review of systems negative.    PMH:   has a past medical history of Benign hematuria and Hypertension.    PSH:   has no past surgical history on file.    FamHx: family history includes Bladder Cancer in his brother; Cataracts in his mother; Macular degeneration in his maternal uncle.    SocHx:  reports that he has never smoked. He has never used smokeless tobacco. He reports that he does not drink alcohol. His drug history is not on file.      Physical Exam:  There were no vitals filed for this visit.  General: A&Ox3,  no apparent distress, no deformities  Neck: No masses, normal thyroid  Lungs: normal inspiration, no use of accessory muscles  Heart: normal pulse, no arrhythmias  Abdomen: Soft, NT, ND, no masses, no hernias, no hepatosplenomegaly  Lymphatic: Neck and groin nodes negative  Skin: The skin is warm and dry. No jaundice.  Ext: No c/c/e.  : Test desc dina, no abnormalities of epididymus.  Right testicle somewhat tender but not much. Penis normal, with normal penile and scrotal skin. Meatus normal. KAREN was normal with PCP this year.    Labs/Studies:   PSA    9/17: 3.7    Impression/Plan:   1. Reassured.  Advised to wear a scrotal support to help prevent a strain injury.

## 2017-12-26 ENCOUNTER — PATIENT MESSAGE (OUTPATIENT)
Dept: INTERNAL MEDICINE | Facility: CLINIC | Age: 62
End: 2017-12-26

## 2017-12-26 DIAGNOSIS — Z12.11 SCREEN FOR COLON CANCER: Primary | ICD-10-CM

## 2018-01-09 RX ORDER — SODIUM, POTASSIUM,MAG SULFATES 17.5-3.13G
SOLUTION, RECONSTITUTED, ORAL ORAL
Qty: 354 ML | Refills: 0 | Status: ON HOLD | OUTPATIENT
Start: 2018-01-09 | End: 2018-01-25 | Stop reason: HOSPADM

## 2018-01-25 ENCOUNTER — ANESTHESIA (OUTPATIENT)
Dept: ENDOSCOPY | Facility: HOSPITAL | Age: 63
End: 2018-01-25
Payer: COMMERCIAL

## 2018-01-25 ENCOUNTER — HOSPITAL ENCOUNTER (OUTPATIENT)
Facility: HOSPITAL | Age: 63
Discharge: HOME | End: 2018-01-25
Attending: FAMILY MEDICINE | Admitting: FAMILY MEDICINE
Payer: COMMERCIAL

## 2018-01-25 ENCOUNTER — SURGERY (OUTPATIENT)
Age: 63
End: 2018-01-25

## 2018-01-25 ENCOUNTER — ANESTHESIA EVENT (OUTPATIENT)
Dept: ENDOSCOPY | Facility: HOSPITAL | Age: 63
End: 2018-01-25
Payer: COMMERCIAL

## 2018-01-25 DIAGNOSIS — Z12.11 COLON CANCER SCREENING: Primary | ICD-10-CM

## 2018-01-25 DIAGNOSIS — K63.5 POLYP OF COLON, UNSPECIFIED PART OF COLON, UNSPECIFIED TYPE: ICD-10-CM

## 2018-01-25 DIAGNOSIS — Z86.010 HISTORY OF COLON POLYPS: ICD-10-CM

## 2018-01-25 DIAGNOSIS — Z12.11 SPECIAL SCREENING FOR MALIGNANT NEOPLASMS, COLON: ICD-10-CM

## 2018-01-25 PROBLEM — Z86.0100 HISTORY OF COLON POLYPS: Status: ACTIVE | Noted: 2018-01-25

## 2018-01-25 PROCEDURE — 88305 TISSUE EXAM BY PATHOLOGIST: CPT | Mod: 26,,, | Performed by: PATHOLOGY

## 2018-01-25 PROCEDURE — 88305 TISSUE EXAM BY PATHOLOGIST: CPT | Performed by: PATHOLOGY

## 2018-01-25 PROCEDURE — 25000003 PHARM REV CODE 250: Performed by: FAMILY MEDICINE

## 2018-01-25 PROCEDURE — 27201012 HC FORCEPS, HOT/COLD, DISP: Performed by: FAMILY MEDICINE

## 2018-01-25 PROCEDURE — 25000003 PHARM REV CODE 250: Performed by: NURSE ANESTHETIST, CERTIFIED REGISTERED

## 2018-01-25 PROCEDURE — 45380 COLONOSCOPY AND BIOPSY: CPT | Mod: 33,,, | Performed by: FAMILY MEDICINE

## 2018-01-25 PROCEDURE — 37000008 HC ANESTHESIA 1ST 15 MINUTES: Performed by: FAMILY MEDICINE

## 2018-01-25 PROCEDURE — 45380 COLONOSCOPY AND BIOPSY: CPT | Performed by: FAMILY MEDICINE

## 2018-01-25 PROCEDURE — 63600175 PHARM REV CODE 636 W HCPCS: Performed by: NURSE ANESTHETIST, CERTIFIED REGISTERED

## 2018-01-25 PROCEDURE — 37000009 HC ANESTHESIA EA ADD 15 MINS: Performed by: FAMILY MEDICINE

## 2018-01-25 RX ORDER — SODIUM CHLORIDE, SODIUM LACTATE, POTASSIUM CHLORIDE, CALCIUM CHLORIDE 600; 310; 30; 20 MG/100ML; MG/100ML; MG/100ML; MG/100ML
INJECTION, SOLUTION INTRAVENOUS CONTINUOUS
Status: DISCONTINUED | OUTPATIENT
Start: 2018-01-25 | End: 2018-01-25 | Stop reason: HOSPADM

## 2018-01-25 RX ORDER — LIDOCAINE HCL/PF 100 MG/5ML
SYRINGE (ML) INTRAVENOUS
Status: DISCONTINUED | OUTPATIENT
Start: 2018-01-25 | End: 2018-01-25

## 2018-01-25 RX ORDER — SODIUM CHLORIDE, SODIUM LACTATE, POTASSIUM CHLORIDE, CALCIUM CHLORIDE 600; 310; 30; 20 MG/100ML; MG/100ML; MG/100ML; MG/100ML
INJECTION, SOLUTION INTRAVENOUS CONTINUOUS PRN
Status: DISCONTINUED | OUTPATIENT
Start: 2018-01-25 | End: 2018-01-25

## 2018-01-25 RX ORDER — PROPOFOL 10 MG/ML
VIAL (ML) INTRAVENOUS
Status: DISCONTINUED | OUTPATIENT
Start: 2018-01-25 | End: 2018-01-25

## 2018-01-25 RX ADMIN — PROPOFOL 80 MG: 10 INJECTION, EMULSION INTRAVENOUS at 09:01

## 2018-01-25 RX ADMIN — SODIUM CHLORIDE, SODIUM LACTATE, POTASSIUM CHLORIDE, AND CALCIUM CHLORIDE: .6; .31; .03; .02 INJECTION, SOLUTION INTRAVENOUS at 08:01

## 2018-01-25 RX ADMIN — LIDOCAINE HYDROCHLORIDE 80 ML: 20 INJECTION, SOLUTION INTRAVENOUS at 09:01

## 2018-01-25 RX ADMIN — SODIUM CHLORIDE, SODIUM LACTATE, POTASSIUM CHLORIDE, AND CALCIUM CHLORIDE: 600; 310; 30; 20 INJECTION, SOLUTION INTRAVENOUS at 09:01

## 2018-01-25 NOTE — TRANSFER OF CARE
"Anesthesia Transfer of Care Note    Patient: Kike Smith    Procedure(s) Performed: Procedure(s) (LRB):  COLONOSCOPY (N/A)    Patient location: GI    Anesthesia Type: MAC    Post assessment: no apparent anesthetic complications    Post vital signs: stable    Level of consciousness: awake, alert and oriented    Nausea/Vomiting: no nausea/vomiting    Complications: none    Transfer of care protocol was followed      Last vitals:   Visit Vitals  /71   Pulse 70   Temp 36.9 °C (98.5 °F) (Oral)   Resp 20   Ht 5' 10" (1.778 m)   Wt 95.7 kg (211 lb)   SpO2 98%   BMI 30.28 kg/m²     "

## 2018-01-25 NOTE — H&P
Short Stay Endoscopy History and Physical    PCP - Anmol Reyna MD    Procedure - Colonoscopy  ASA - 2  Mallampati - per anesthesia  History of Anesthesia problems - no  Family history Anesthesia problems -  no     HPI:  This is a 62 y.o. male here for evaluation of :   Active Hospital Problems    Diagnosis  POA    *Colon cancer screening [Z12.11]  Not Applicable    Special screening for malignant neoplasms, colon [Z12.11]  Not Applicable    History of colon polyps [Z86.010]  Not Applicable     He has had colon polyps since he was in his 30's.  He has had multiple colonoscopies and has had adenomatous polyps.        Resolved Hospital Problems    Diagnosis Date Resolved POA   No resolved problems to display.         Health Maintenance       Date Due Completion Date    Colonoscopy 04/26/2018 4/26/2013    Lipid Panel 02/20/2022 2/20/2017    TETANUS VACCINE 01/12/2026 1/12/2016          Screening - yes  History of polyps - yes  Diarrhea - no  Anemia - no  Blood in stools - no  Abdominal pain - no  Other - no    ROS:  CONSTITUTIONAL: Denies weight change,  fatigue, fevers, chills, night sweats.  CARDIOVASCULAR: Denies chest pain, shortness of breath, orthopnea and edema.  RESPIRATORY: Denies cough, hemoptysis, dyspnea, and wheezing.  GI: See HPI.    Medical History:   Past Medical History:   Diagnosis Date    Benign hematuria     s/p urol/cysto    Hypertension        Surgical History:   History reviewed. No pertinent surgical history.    Family History:   Family History   Problem Relation Age of Onset    Macular degeneration Maternal Uncle     Bladder Cancer Brother      smoker    Cataracts Mother        Social History:   Social History   Substance Use Topics    Smoking status: Never Smoker    Smokeless tobacco: Never Used    Alcohol use No       Allergies:   Review of patient's allergies indicates:  No Known Allergies    Medications:   No current facility-administered medications on file prior to  encounter.      Current Outpatient Prescriptions on File Prior to Encounter   Medication Sig Dispense Refill    aspirin 81 MG Chew Take 81 mg by mouth once daily.      CIPRODEX 0.3-0.1 % DrpS INSTILL 3 DROPS INTO AFFECTED EAR THREE TIMES A DAY  0    fexofenadine (ALLEGRA) 30 MG tablet Take 30 mg by mouth 2 (two) times daily.      hydrochlorothiazide (HYDRODIURIL) 25 MG tablet Take 1 tablet (25 mg total) by mouth once daily. 90 tablet 3    azelastine (ASTELIN) 137 mcg (0.1 %) nasal spray 2 sprays 2 (two) times daily.  1    fluticasone (FLONASE) 50 mcg/actuation nasal spray instill 2 sprays into each nostril once daily 48 g 3       Physical Exam:  Vital Signs:   Vitals:    01/25/18 0832   BP: 121/87   Pulse: 63   Resp: 20   Temp: 98.5 °F (36.9 °C)     General Appearance: Well appearing in no acute distress  ENT: OP clear  Chest: CTA B  CV: RRR, no m/r/g  Abd: s/nt/nd/nabs  Ext: no edema    Labs:Reviewed    IMP:  Active Hospital Problems    Diagnosis  POA    *Colon cancer screening [Z12.11]  Not Applicable    Special screening for malignant neoplasms, colon [Z12.11]  Not Applicable    History of colon polyps [Z86.010]  Not Applicable     He has had colon polyps since he was in his 30's.  He has had multiple colonoscopies and has had adenomatous polyps.        Resolved Hospital Problems    Diagnosis Date Resolved POA   No resolved problems to display.         Plan:   I have explained the risks and benefits of colonoscopy to the patient including but not limited to bleeding, perforation, infection, and death. The patient wishes to proceed.

## 2018-01-25 NOTE — DISCHARGE INSTRUCTIONS

## 2018-01-25 NOTE — ANESTHESIA RELEASE NOTE
"Anesthesia Release from PACU Note    Patient: Kike Smith    Procedure(s) Performed: Procedure(s) (LRB):  COLONOSCOPY (N/A)    Anesthesia type: MAC    Post pain: Adequate analgesia    Post assessment: no apparent anesthetic complications and tolerated procedure well    Last Vitals:   Visit Vitals  /71   Pulse 70   Temp 36.9 °C (98.5 °F) (Oral)   Resp 20   Ht 5' 10" (1.778 m)   Wt 95.7 kg (211 lb)   SpO2 98%   BMI 30.28 kg/m²       Post vital signs: stable    Level of consciousness: awake, alert  and oriented    Nausea/Vomiting: no nausea/no vomiting    Complications: none    Airway Patency: patent    Respiratory: unassisted, spontaneous ventilation, room air    Cardiovascular: stable and blood pressure at baseline    Hydration: euvolemic  "

## 2018-01-25 NOTE — DISCHARGE SUMMARY
Endoscopy Discharge Summary      Admit Date: 1/25/2018    Discharge Date and Time:  1/25/2018 9:58 AM    Attending Physician: Juan A Walter MD     Discharge Physician: Juan A Walter MD     Principal Admitting Diagnoses: Colon cancer screening         Discharge Diagnosis: The primary encounter diagnosis was Colon cancer screening. Diagnoses of Special screening for malignant neoplasms, colon, History of colon polyps, and Polyp of colon, unspecified part of colon, unspecified type were also pertinent to this visit.     Discharged Condition: Good    Indication for Admission: Colon cancer screening     Hospital Course: Patient was admitted for an inpatient procedure and tolerated the procedure well with no complications.    Significant Diagnostic Studies: Colonoscopy with cold biopsy polypectomy    Pathology (if any):  Specimen (12h ago through future)    Start     Ordered    01/25/18 0947  Specimen to Pathology - Surgery  Once     Comments:  1. ascending colon polyps (1mm sessile, <1mm sessile)2. Transverse colon Polyps x2      01/25/18 0955          Estimated Blood Loss: 1 ml.    Discussed with: patient and family.    Disposition: Home.    Follow Up/Patient Instructions:   Current Discharge Medication List      CONTINUE these medications which have NOT CHANGED    Details   aspirin 81 MG Chew Take 81 mg by mouth once daily.      CIPRODEX 0.3-0.1 % DrpS INSTILL 3 DROPS INTO AFFECTED EAR THREE TIMES A DAY  Refills: 0      fexofenadine (ALLEGRA) 30 MG tablet Take 30 mg by mouth 2 (two) times daily.      hydrochlorothiazide (HYDRODIURIL) 25 MG tablet Take 1 tablet (25 mg total) by mouth once daily.  Qty: 90 tablet, Refills: 3      azelastine (ASTELIN) 137 mcg (0.1 %) nasal spray 2 sprays 2 (two) times daily.  Refills: 1      fluticasone (FLONASE) 50 mcg/actuation nasal spray instill 2 sprays into each nostril once daily  Qty: 48 g, Refills: 3         STOP taking these medications       sodium,potassium,mag sulfates  (SUPREP BOWEL PREP KIT) 17.5-3.13-1.6 gram SolR Comments:   Reason for Stopping:                 Discharge Procedure Orders  Diet general     Activity as tolerated     Call MD for:  temperature >100.4     Call MD for:  persistent nausea and vomiting     Call MD for:  severe uncontrolled pain     Call MD for:  difficulty breathing, headache or visual disturbances     Call MD for:  redness, tenderness, or signs of infection (pain, swelling, redness, odor or green/yellow discharge around incision site)     Call MD for:  hives     Call MD for:  persistent dizziness or light-headedness     No dressing needed         Follow-up Information     Juan A Walter MD. Call in 1 week.    Specialty:  Family Medicine  Why:  To receive pathology results.  Contact information:  [address] [facility] [address] [phone]                   [email or web address]@

## 2018-01-25 NOTE — ANESTHESIA PREPROCEDURE EVALUATION
01/25/2018  Kike Smith is a 62 y.o., male.    Pre-op Assessment    I have reviewed the Patient Summary Reports.     I have reviewed the Nursing Notes.   I have reviewed the Medications.     Review of Systems  Anesthesia Hx:  No previous Anesthesia  Neg history of prior surgery. Denies Family Hx of Anesthesia complications.   Denies Personal Hx of Anesthesia complications.   Social:  Non-Smoker    Cardiovascular:   Hypertension    Hepatic/GI:   Bowel Prep.        Physical Exam  General:  Well nourished    Airway/Jaw/Neck:  Airway Findings: Mouth Opening: Normal Tongue: Normal  General Airway Assessment: Adult  Mallampati: I  Improves to I with phonation.  TM Distance: Normal, at least 6 cm       Chest/Lungs:  Chest/Lungs Findings: Clear to auscultation, Normal Respiratory Rate     Heart/Vascular:  Heart Findings: Rate: Normal        Mental Status:  Mental Status Findings:  Cooperative, Alert and Oriented         Anesthesia Plan  Type of Anesthesia, risks & benefits discussed:  Anesthesia Type:  MAC  Patient's Preference:   Intra-op Monitoring Plan: standard ASA monitors  Intra-op Monitoring Plan Comments:   Post Op Pain Control Plan:   Post Op Pain Control Plan Comments:   Induction:   IV  Beta Blocker:  Patient is not currently on a Beta-Blocker (No further documentation required).       Informed Consent: Patient understands risks and agrees with Anesthesia plan.  Questions answered. Anesthesia consent signed with patient.  ASA Score: 2     Day of Surgery Review of History & Physical: I have interviewed and examined the patient. I have reviewed the patient's H&P dated:

## 2018-01-25 NOTE — ANESTHESIA POSTPROCEDURE EVALUATION
"Anesthesia Post Evaluation    Patient: Kike Smith    Procedure(s) Performed: Procedure(s) (LRB):  COLONOSCOPY (N/A)    Final Anesthesia Type: MAC  Patient location during evaluation: GI PACU  Patient participation: Yes- Able to Participate  Level of consciousness: awake and alert  Post-procedure vital signs: reviewed and stable  Pain management: adequate  Airway patency: patent  PONV status at discharge: No PONV  Anesthetic complications: no      Cardiovascular status: blood pressure returned to baseline  Respiratory status: unassisted, spontaneous ventilation and room air  Hydration status: euvolemic  Follow-up not needed.        Visit Vitals  /71   Pulse 70   Temp 36.9 °C (98.5 °F) (Oral)   Resp 20   Ht 5' 10" (1.778 m)   Wt 95.7 kg (211 lb)   SpO2 98%   BMI 30.28 kg/m²       Pain/Renée Score: Pain Assessment Performed: Yes (1/25/2018  8:32 AM)  Presence of Pain: denies (1/25/2018  8:32 AM)        "

## 2018-01-26 VITALS
SYSTOLIC BLOOD PRESSURE: 127 MMHG | TEMPERATURE: 99 F | DIASTOLIC BLOOD PRESSURE: 77 MMHG | HEART RATE: 60 BPM | RESPIRATION RATE: 18 BRPM | BODY MASS INDEX: 30.21 KG/M2 | HEIGHT: 70 IN | WEIGHT: 211 LBS | OXYGEN SATURATION: 99 %

## 2018-02-01 NOTE — PROGRESS NOTES
Dear Anmol Reyna MD,    I recently cared for Kike Smith and performed an endoscopy.  Tissue was sent for pathology evaluation and I will have a letter written to ask the patient to repeat the colonoscopy in 5 years.  The pathology showed that there was adenomatous tissue present.  Thank you for allowing me to participate in the care of your patient.  Please call me for any questions that you might have.      Dr. Juan A Walter  [phone] cell  [phone] office      NURSING STAFF:Please  inform the patient that I reviewed the recent pathology obtained at the time of colonoscopy.    The results showed that there was adenomatous tissue present which is benign and based on that, I recommend that the patient have a repeat colonoscopy performed in 5 years.     If the patient has MyChart, this message has been sent to them.  Confirm that they read the note.  If not, copy the information and print a letter to send to the patient at this time.  Confirm that a notation to the PCP was done.      Dear Kike Smith,    This is to inform you that I have reviewed your recent colonoscopy pathology.  The results showed that you had adenomatous tissue present which is benign and based on that, I recommend that you have a repeat colonoscopy performed in 5 years.      Dr. Juan A Walter  [phone]

## 2018-02-15 ENCOUNTER — OFFICE VISIT (OUTPATIENT)
Dept: INTERNAL MEDICINE | Facility: CLINIC | Age: 63
End: 2018-02-15
Payer: COMMERCIAL

## 2018-02-15 VITALS
BODY MASS INDEX: 30.77 KG/M2 | SYSTOLIC BLOOD PRESSURE: 132 MMHG | HEART RATE: 78 BPM | HEIGHT: 70 IN | WEIGHT: 214.94 LBS | DIASTOLIC BLOOD PRESSURE: 92 MMHG | TEMPERATURE: 97 F | OXYGEN SATURATION: 98 %

## 2018-02-15 DIAGNOSIS — J01.90 ACUTE SINUSITIS, RECURRENCE NOT SPECIFIED, UNSPECIFIED LOCATION: Primary | ICD-10-CM

## 2018-02-15 LAB
FLUAV AG SPEC QL IA: NEGATIVE
FLUBV AG SPEC QL IA: NEGATIVE
SPECIMEN SOURCE: NORMAL

## 2018-02-15 PROCEDURE — 99999 PR PBB SHADOW E&M-EST. PATIENT-LVL III: CPT | Mod: PBBFAC,,, | Performed by: PHYSICIAN ASSISTANT

## 2018-02-15 PROCEDURE — 87400 INFLUENZA A/B EACH AG IA: CPT | Mod: PO

## 2018-02-15 PROCEDURE — 3008F BODY MASS INDEX DOCD: CPT | Mod: S$GLB,,, | Performed by: PHYSICIAN ASSISTANT

## 2018-02-15 PROCEDURE — 99213 OFFICE O/P EST LOW 20 MIN: CPT | Mod: S$GLB,,, | Performed by: PHYSICIAN ASSISTANT

## 2018-02-15 NOTE — PROGRESS NOTES
"  Subjective:      Patient ID: Kike Smith is a 62 y.o. male.    Chief Complaint: Sinus Problem; Influenza; and Cough    Scheduled to see ENT tomorrow.   Requesting flu swab.       URI    This is a new problem. Episode onset: days. There has been no fever. Associated symptoms include congestion, coughing, ear pain, headaches, rhinorrhea, sinus pain and a sore throat. Pertinent negatives include no abdominal pain, chest pain, diarrhea, dysuria, joint pain, joint swelling, nausea, neck pain, plugged ear sensation, rash, sneezing, swollen glands, vomiting or wheezing. Treatments tried: flonase, astelin, allegra. The treatment provided no relief.       Review of Systems   Constitutional: Negative for activity change, appetite change, chills, diaphoresis, fatigue, fever and unexpected weight change.   HENT: Positive for congestion, ear pain, postnasal drip, rhinorrhea, sinus pain, sinus pressure and sore throat. Negative for dental problem, drooling, ear discharge, facial swelling, hearing loss, mouth sores, nosebleeds, sneezing and trouble swallowing.    Eyes: Negative for pain, discharge, redness, itching and visual disturbance.   Respiratory: Positive for cough. Negative for chest tightness, shortness of breath and wheezing.    Cardiovascular: Negative for chest pain, palpitations and leg swelling.   Gastrointestinal: Negative for abdominal pain, constipation, diarrhea, nausea and vomiting.   Endocrine: Negative.    Genitourinary: Negative.  Negative for difficulty urinating and dysuria.   Musculoskeletal: Negative for joint pain, joint swelling, myalgias, neck pain and neck stiffness.   Skin: Negative for rash.   Allergic/Immunologic: Negative for environmental allergies, food allergies and immunocompromised state.   Neurological: Positive for headaches. Negative for dizziness and weakness.     Objective:   BP (!) 132/92   Pulse 78   Temp 97.2 °F (36.2 °C) (Tympanic)   Ht 5' 10" (1.778 m)   Wt 97.5 kg (214 lb " 15.2 oz)   SpO2 98%   BMI 30.84 kg/m²     Physical Exam   Constitutional: He is oriented to person, place, and time. He appears well-developed and well-nourished.   HENT:   Head: Normocephalic and atraumatic.   Right Ear: Hearing, tympanic membrane, external ear and ear canal normal. No tenderness.   Left Ear: Hearing, tympanic membrane, external ear and ear canal normal. No tenderness.   Nose: Mucosal edema and rhinorrhea present. No sinus tenderness. Right sinus exhibits maxillary sinus tenderness and frontal sinus tenderness. Left sinus exhibits maxillary sinus tenderness and frontal sinus tenderness.   Mouth/Throat: Uvula is midline and mucous membranes are normal. No oral lesions. Normal dentition. No dental abscesses, uvula swelling or dental caries. Oropharyngeal exudate and posterior oropharyngeal erythema present. No posterior oropharyngeal edema or tonsillar abscesses. No tonsillar exudate.   Eyes: Conjunctivae and EOM are normal. Pupils are equal, round, and reactive to light. Right eye exhibits no discharge. Left eye exhibits no discharge.   Neck: Normal range of motion. Neck supple.   Cardiovascular: Normal rate, regular rhythm and normal heart sounds.  Exam reveals no gallop and no friction rub.    No murmur heard.  Pulmonary/Chest: Effort normal and breath sounds normal. No respiratory distress. He has no wheezes. He has no rales.   Lymphadenopathy:     He has no cervical adenopathy.   Neurological: He is alert and oriented to person, place, and time.   Skin: Skin is warm. No rash noted. No erythema. No pallor.   Psychiatric: He has a normal mood and affect. His behavior is normal. Judgment and thought content normal.   Nursing note and vitals reviewed.      Assessment:     1. Acute sinusitis, recurrence not specified, unspecified location      Plan:   Acute sinusitis, recurrence not specified, unspecified location  -     Influenza antigen Nasopharyngeal Swab    -scheduled to see ENT tomorrow.    -continue nasal sprays    Follow-up if symptoms worsen or fail to improve.

## 2018-03-02 ENCOUNTER — LAB VISIT (OUTPATIENT)
Dept: LAB | Facility: HOSPITAL | Age: 63
End: 2018-03-02
Attending: FAMILY MEDICINE
Payer: COMMERCIAL

## 2018-03-02 DIAGNOSIS — I10 ESSENTIAL HYPERTENSION: ICD-10-CM

## 2018-03-02 DIAGNOSIS — Z12.5 SCREENING FOR PROSTATE CANCER: ICD-10-CM

## 2018-03-02 LAB
ANION GAP SERPL CALC-SCNC: 8 MMOL/L
BUN SERPL-MCNC: 28 MG/DL
CALCIUM SERPL-MCNC: 9.5 MG/DL
CHLORIDE SERPL-SCNC: 103 MMOL/L
CHOLEST SERPL-MCNC: 164 MG/DL
CHOLEST/HDLC SERPL: 4.2 {RATIO}
CO2 SERPL-SCNC: 29 MMOL/L
COMPLEXED PSA SERPL-MCNC: 3.8 NG/ML
CREAT SERPL-MCNC: 1 MG/DL
EST. GFR  (AFRICAN AMERICAN): >60 ML/MIN/1.73 M^2
EST. GFR  (NON AFRICAN AMERICAN): >60 ML/MIN/1.73 M^2
GLUCOSE SERPL-MCNC: 85 MG/DL
HDLC SERPL-MCNC: 39 MG/DL
HDLC SERPL: 23.8 %
LDLC SERPL CALC-MCNC: 113 MG/DL
NONHDLC SERPL-MCNC: 125 MG/DL
POTASSIUM SERPL-SCNC: 4.1 MMOL/L
SODIUM SERPL-SCNC: 140 MMOL/L
TRIGL SERPL-MCNC: 60 MG/DL

## 2018-03-02 PROCEDURE — 80048 BASIC METABOLIC PNL TOTAL CA: CPT

## 2018-03-02 PROCEDURE — 80061 LIPID PANEL: CPT

## 2018-03-02 PROCEDURE — 36415 COLL VENOUS BLD VENIPUNCTURE: CPT | Mod: PO

## 2018-03-02 PROCEDURE — 84153 ASSAY OF PSA TOTAL: CPT

## 2018-03-13 ENCOUNTER — PATIENT OUTREACH (OUTPATIENT)
Dept: ADMINISTRATIVE | Facility: HOSPITAL | Age: 63
End: 2018-03-13

## 2018-03-26 ENCOUNTER — OFFICE VISIT (OUTPATIENT)
Dept: INTERNAL MEDICINE | Facility: CLINIC | Age: 63
End: 2018-03-26
Payer: COMMERCIAL

## 2018-03-26 VITALS
HEIGHT: 70 IN | TEMPERATURE: 97 F | DIASTOLIC BLOOD PRESSURE: 85 MMHG | WEIGHT: 212.75 LBS | HEART RATE: 68 BPM | SYSTOLIC BLOOD PRESSURE: 135 MMHG | BODY MASS INDEX: 30.46 KG/M2 | OXYGEN SATURATION: 97 %

## 2018-03-26 DIAGNOSIS — Z00.00 ROUTINE HEALTH MAINTENANCE: Primary | ICD-10-CM

## 2018-03-26 DIAGNOSIS — I10 ESSENTIAL HYPERTENSION: ICD-10-CM

## 2018-03-26 DIAGNOSIS — R53.83 FATIGUE, UNSPECIFIED TYPE: ICD-10-CM

## 2018-03-26 PROCEDURE — 99999 PR PBB SHADOW E&M-EST. PATIENT-LVL III: CPT | Mod: PBBFAC,,, | Performed by: FAMILY MEDICINE

## 2018-03-26 PROCEDURE — 99396 PREV VISIT EST AGE 40-64: CPT | Mod: S$GLB,,, | Performed by: FAMILY MEDICINE

## 2018-03-26 RX ORDER — PRAVASTATIN SODIUM 40 MG/1
40 TABLET ORAL DAILY
Qty: 90 TABLET | Refills: 3 | Status: SHIPPED | OUTPATIENT
Start: 2018-03-26 | End: 2019-04-01 | Stop reason: SDUPTHER

## 2018-03-26 RX ORDER — BENZONATATE 200 MG/1
200 CAPSULE ORAL 3 TIMES DAILY
Refills: 0 | COMMUNITY
Start: 2018-02-16 | End: 2018-03-26

## 2018-03-26 RX ORDER — CEFDINIR 300 MG/1
300 CAPSULE ORAL 2 TIMES DAILY
Refills: 0 | COMMUNITY
Start: 2018-02-16 | End: 2018-03-26

## 2018-03-26 NOTE — Clinical Note
Also needs one year f/u after one year lab. I dont see where nurse on lunch duty ordered one year cmp, psa, lipid Please put in with one year ua

## 2018-03-26 NOTE — PROGRESS NOTES
Subjective:       Patient ID: Kike Smith is a  male.    Chief Complaint: Annual Exam    HPIhere for phys exam no c/o;htn controlled and nl bps;stable psa ; hx intermitt borderline elev in past; asympt no bph symp;Side effects and dosing discussed statin cv risk 11%    fam hx bladder ca/hematuria nl w./u(brother bladder ca /smoker) : nl hmaturia w/u yrs ago. Would like to monitor for inc hematuria. No gross hematuria    Plans to resume exerc wt loss wt [employer]; he been feeling more out of shape. No cp sob    Past Medical History   Diagnosis Date    Hypertension     Benign hematuria      s/p urol/cysto     History reviewed. No pertinent past surgical history.  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         Review of Systems  Cardiovascular: no chest pain  Chest: no shortness of breath  Abd: no abd pain  Remainder review of systems negative    Objective:      Physical Exam   Constitutional: He is oriented to person, place, and time. He appears well-developed and well-nourished.   HENT:   Head: Normocephalic and atraumatic.   Right Ear: External ear normal.   Left Ear: External ear normal.   Nose: Nose normal.   Mouth/Throat: Oropharynx is clear and moist.   Nl tms   Eyes: Conjunctivae and EOM are normal. Pupils are equal, round, and reactive to light. No scleral icterus.   Neck: Normal range of motion. Neck supple. Carotid bruit is not present.   Cardiovascular:  Normal rate, regular rhythm and normal heart sounds.  Exam reveals no gallop and no friction rub.    No murmur heard.  Pulmonary/Chest: Effort normal and breath sounds normal. He has no wheezes.   Abdominal: Soft. Bowel sounds are normal. He exhibits no distension and no mass. There is no hepatosplenomegaly. There is no tenderness. There is no rebound and no guarding.   Musculoskeletal: Normal range of motion. He exhibits no tenderness.   Lymphadenopathy:     He has no cervical adenopathy.   Neurological: He is alert and oriented to person, place, and time. No cranial nerve deficit. Coordination normal.   Skin: Skin is warm and dry. No rash noted. No erythema.   Psychiatric: He has a normal mood and affect. His behavior is normal. Judgment and thought content normal.   Nursing note and vitals reviewed.  gu nl test. No hernia    Assessment:       1. Routine health maintenance      elev psa veloc  fam hx bladder ca/hematuria nl w./u  Plan:       He req annual ua and further eval if inc hematuria  Lab 12 months and follow up after  flp alt 6 weeks    Routine health maintenance  -     Comprehensive metabolic panel; Future; Expected date: 03/26/2019  -     Lipid panel; Future; Expected date: 03/26/2019  -     PSA, Screening; Future; Expected date: 03/26/2019    Fatigue, unspecified type  -     Cardiac treadmill stress test; Future    Essential hypertension  -     Lipid panel; Future; Expected date: 05/10/2018  -     ALT (SGPT); Future; Expected date: 05/10/2019  -     Urinalysis; Future; Expected date: 03/26/2019    Other orders  -     pravastatin (PRAVACHOL) 40 MG tablet; Take 1 tablet (40 mg total) by mouth once daily.  Dispense: 90 tablet; Refill: 3

## 2018-03-29 ENCOUNTER — CLINICAL SUPPORT (OUTPATIENT)
Dept: CARDIOLOGY | Facility: CLINIC | Age: 63
End: 2018-03-29
Attending: FAMILY MEDICINE
Payer: COMMERCIAL

## 2018-03-29 DIAGNOSIS — R53.83 FATIGUE, UNSPECIFIED TYPE: ICD-10-CM

## 2018-03-29 PROCEDURE — 93015 CV STRESS TEST SUPVJ I&R: CPT | Mod: S$GLB,,, | Performed by: INTERNAL MEDICINE

## 2018-03-30 LAB — DIASTOLIC DYSFUNCTION: NO

## 2018-04-05 ENCOUNTER — OFFICE VISIT (OUTPATIENT)
Dept: DERMATOLOGY | Facility: CLINIC | Age: 63
End: 2018-04-05
Payer: COMMERCIAL

## 2018-04-05 DIAGNOSIS — L82.1 SEBORRHEIC KERATOSIS: ICD-10-CM

## 2018-04-05 DIAGNOSIS — L81.4 LENTIGINES: Primary | ICD-10-CM

## 2018-04-05 DIAGNOSIS — D22.9 MULTIPLE NEVI: ICD-10-CM

## 2018-04-05 PROCEDURE — 99213 OFFICE O/P EST LOW 20 MIN: CPT | Mod: S$GLB,,, | Performed by: DERMATOLOGY

## 2018-04-05 PROCEDURE — 99999 PR PBB SHADOW E&M-EST. PATIENT-LVL II: CPT | Mod: PBBFAC,,, | Performed by: DERMATOLOGY

## 2018-04-05 NOTE — PATIENT INSTRUCTIONS

## 2018-04-24 ENCOUNTER — OFFICE VISIT (OUTPATIENT)
Dept: INTERNAL MEDICINE | Facility: CLINIC | Age: 63
End: 2018-04-24
Payer: COMMERCIAL

## 2018-04-24 VITALS
HEIGHT: 70 IN | BODY MASS INDEX: 30.36 KG/M2 | WEIGHT: 212.06 LBS | SYSTOLIC BLOOD PRESSURE: 122 MMHG | DIASTOLIC BLOOD PRESSURE: 72 MMHG | TEMPERATURE: 96 F | HEART RATE: 70 BPM | OXYGEN SATURATION: 98 %

## 2018-04-24 DIAGNOSIS — L98.9 SKIN LESION: Primary | ICD-10-CM

## 2018-04-24 PROCEDURE — 3074F SYST BP LT 130 MM HG: CPT | Mod: CPTII,S$GLB,, | Performed by: FAMILY MEDICINE

## 2018-04-24 PROCEDURE — 99212 OFFICE O/P EST SF 10 MIN: CPT | Mod: S$GLB,,, | Performed by: FAMILY MEDICINE

## 2018-04-24 PROCEDURE — 99999 PR PBB SHADOW E&M-EST. PATIENT-LVL III: CPT | Mod: PBBFAC,,, | Performed by: FAMILY MEDICINE

## 2018-04-24 PROCEDURE — 3078F DIAST BP <80 MM HG: CPT | Mod: CPTII,S$GLB,, | Performed by: FAMILY MEDICINE

## 2018-04-24 NOTE — PROGRESS NOTES
Subjective:       Patient ID: Kike Smith is a 62 y.o. male.    Chief Complaint: No chief complaint on file.    HPIseveral months ago noticed small bumps base scrotum. May have become light colored since. Asymp.   Review of Systems    Objective:      Physical Exam  base left scrotum with 3 round/oval 4-5 mm flesh colored flat smooth papule.    Assessment:       1. Skin lesion        Plan:       **not certain if hyperkeratotic area vs verruca vs other lesion  Plans to monitor if changes or not gone 4 months to f/u dr kc*

## 2018-05-10 ENCOUNTER — LAB VISIT (OUTPATIENT)
Dept: LAB | Facility: HOSPITAL | Age: 63
End: 2018-05-10
Attending: FAMILY MEDICINE
Payer: COMMERCIAL

## 2018-05-10 ENCOUNTER — PATIENT MESSAGE (OUTPATIENT)
Dept: INTERNAL MEDICINE | Facility: CLINIC | Age: 63
End: 2018-05-10

## 2018-05-10 DIAGNOSIS — I10 ESSENTIAL HYPERTENSION: ICD-10-CM

## 2018-05-10 LAB
ALT SERPL W/O P-5'-P-CCNC: 21 U/L
CHOLEST SERPL-MCNC: 152 MG/DL
CHOLEST/HDLC SERPL: 4.1 {RATIO}
HDLC SERPL-MCNC: 37 MG/DL
HDLC SERPL: 24.3 %
LDLC SERPL CALC-MCNC: 103.4 MG/DL
NONHDLC SERPL-MCNC: 115 MG/DL
TRIGL SERPL-MCNC: 58 MG/DL

## 2018-05-10 PROCEDURE — 80061 LIPID PANEL: CPT

## 2018-05-10 PROCEDURE — 36415 COLL VENOUS BLD VENIPUNCTURE: CPT | Mod: PO

## 2018-05-10 PROCEDURE — 84460 ALANINE AMINO (ALT) (SGPT): CPT

## 2018-05-18 RX ORDER — HYDROCHLOROTHIAZIDE 25 MG/1
TABLET ORAL
Qty: 90 TABLET | Refills: 3 | Status: SHIPPED | OUTPATIENT
Start: 2018-05-18 | End: 2019-05-25 | Stop reason: SDUPTHER

## 2018-06-28 ENCOUNTER — OFFICE VISIT (OUTPATIENT)
Dept: INTERNAL MEDICINE | Facility: CLINIC | Age: 63
End: 2018-06-28
Payer: COMMERCIAL

## 2018-06-28 VITALS
OXYGEN SATURATION: 94 % | HEIGHT: 70 IN | BODY MASS INDEX: 31.1 KG/M2 | SYSTOLIC BLOOD PRESSURE: 136 MMHG | DIASTOLIC BLOOD PRESSURE: 78 MMHG | WEIGHT: 217.25 LBS | TEMPERATURE: 95 F | HEART RATE: 90 BPM

## 2018-06-28 DIAGNOSIS — R51.9 NONINTRACTABLE HEADACHE, UNSPECIFIED CHRONICITY PATTERN, UNSPECIFIED HEADACHE TYPE: Primary | ICD-10-CM

## 2018-06-28 DIAGNOSIS — I10 ESSENTIAL HYPERTENSION: ICD-10-CM

## 2018-06-28 PROCEDURE — 3078F DIAST BP <80 MM HG: CPT | Mod: CPTII,S$GLB,, | Performed by: FAMILY MEDICINE

## 2018-06-28 PROCEDURE — 99999 PR PBB SHADOW E&M-EST. PATIENT-LVL III: CPT | Mod: PBBFAC,,, | Performed by: FAMILY MEDICINE

## 2018-06-28 PROCEDURE — 99214 OFFICE O/P EST MOD 30 MIN: CPT | Mod: S$GLB,,, | Performed by: FAMILY MEDICINE

## 2018-06-28 PROCEDURE — 3008F BODY MASS INDEX DOCD: CPT | Mod: CPTII,S$GLB,, | Performed by: FAMILY MEDICINE

## 2018-06-28 PROCEDURE — 3075F SYST BP GE 130 - 139MM HG: CPT | Mod: CPTII,S$GLB,, | Performed by: FAMILY MEDICINE

## 2018-06-28 RX ORDER — CEFDINIR 300 MG/1
CAPSULE ORAL
COMMUNITY
End: 2018-06-28

## 2018-06-28 RX ORDER — NYSTATIN 100000 [USP'U]/ML
SUSPENSION ORAL
COMMUNITY
End: 2018-06-28

## 2018-06-28 RX ORDER — DOXYCYCLINE 100 MG/1
CAPSULE ORAL
COMMUNITY
End: 2018-06-28

## 2018-06-28 RX ORDER — AZELASTINE 1 MG/ML
SPRAY, METERED NASAL
COMMUNITY
End: 2019-09-03

## 2018-06-28 RX ORDER — OSELTAMIVIR PHOSPHATE 75 MG/1
CAPSULE ORAL
COMMUNITY
End: 2018-06-28

## 2018-06-28 RX ORDER — BENZONATATE 200 MG/1
CAPSULE ORAL
COMMUNITY
End: 2018-06-28

## 2018-06-28 RX ORDER — PREDNISONE 10 MG/1
TABLET ORAL
COMMUNITY
End: 2018-06-28

## 2018-06-28 NOTE — ASSESSMENT & PLAN NOTE
Manual BP ok; I think his home BP meter is not accurate. Discussed digital hypertension program; sent note to Dr. Reyna's nurse to research whether he would qualify. He is very interested.

## 2018-06-28 NOTE — PATIENT INSTRUCTIONS
4 Steps for Eating Healthier  Changing the way you eat can improve your health. It can lower your cholesterol and blood pressure, and help you stay at a healthy weight. Your diet doesnt have to be bland and boring to be healthy. Just watch your calories and follow these steps:    1. Eat fewer unhealthy fats  · Choose more fish and lean meats instead of fatty cuts of meat.  · Skip butter and lard, and use less margarine.  · Pass on foods that have palm, coconut, or hydrogenated oils.  · Eat fewer high-fat dairy foods like cheese, ice cream, and whole milk.  · Get a heart-healthy cookbook and try some low-fat recipes.  2. Go light on salt  · Keep the saltshaker off the table.  · Limit high-salt ingredients, such as soy sauce, bouillon, and garlic salt.  · Instead of adding salt when cooking, season your food with herbs and flavorings. Try lemon, garlic, and onion.  · Limit convenience foods, such as boxed or canned foods and restaurant food.  · Read food labels and choose lower-sodium options.  3. Limit sugar  · Pause before you add sugars to pancakes, cereal, coffee, or tea. This includes white and brown table sugar, syrup, honey, and molasses. Cut your usual amount by half.  · Use non-sugar sweeteners. Stevia, aspartame, and sucralose can satisfy a sweet tooth without adding calories.  · Swap out sugar-filled soda and other drinks. Buy sugar-free or low-calorie beverages. Remember water is always the best choice.  · Read labels and choose foods with less added sugar. Keep in mind that dairy foods and foods with fruit will have some natural sugar.  · Cut the sugar in recipes by 1/3 to 1/2. Boost the flavor with extracts like almond, vanilla, or orange. Or add spices such as cinnamon or nutmeg.  4. Eat more fiber  · Eat fresh fruits and vegetables every day.  · Boost your diet with whole grains. Go for oats, whole-grain rice, and bran.  · Add beans and lentils to your meals.  · Drink more water to match your fiber  increase. This is to help prevent constipation.  Date Last Reviewed: 5/11/2015  © 3722-6466 [employer]. [address] [place] [address], [place] [address]. All rights reserved. This information is not intended as a substitute for professional medical care. Always follow your healthcare professional's instructions.        Tips for Using Less Salt    Most people with heart problems need to eat less salt (sodium). Reducing the amount of salt you eat may help control your blood pressure. The higher your blood pressure, the greater your risk for heart disease, stroke, blindness, and kidney problems.  At the store  · Make low-salt choices by reading labels carefully. Look for the total amount of sodium per serving.  · Use more fresh food. Buy more fruits and vegetables. Select lean meats, fish, and poultry.  · Use fewer frozen, canned, and packaged foods which often contain a lot of sodium.  · Use plain frozen vegetables without sauces or toppings. These products are often low- or no-sodium.  · Opt for reduced-sodium or no-salt-added versions of canned vegetables and soups.  In the kitchen  · Don't add salt to food when you're cooking. Season with flavorings such as onion, garlic, pepper, salt-free herbal blends, and lemon or lime juice.  · Use a cookbook containing low-salt recipes. It can give you ideas for tasty meals that are healthy for your heart.  · Sprinkle salt-free herbal blends on vegetables and meat.  · Drain and rinse canned foods, such as canned beans and vegetables, before cooking or eating.  Eating out  · Tell the  you're on a low-salt diet. Ask questions about the menu.  · Order fish, chicken, and meat broiled, baked, poached, or grilled without salt, butter, or breading.  · Use lemon, pepper, and salt-free herb mixes to add flavor.  · Choose plain steamed rice, boiled noodles, and baked or boiled potatoes. Top potatoes with chives and a little sour cream.     Beware! Salt goes by many other  names. Limit foods with these words listed as ingredients: salt, sodium, soy sauce, baking soda, baking powder, MSG, monosodium, Na (the chemical symbol for sodium). Some antacids are also high in salt.   Date Last Reviewed: 6/19/2015  © 2563-4314 [employer]. [address] [place] [address]. All rights reserved. This information is not intended as a substitute for professional medical care. Always follow your healthcare professional's instructions.

## 2018-06-28 NOTE — ASSESSMENT & PLAN NOTE
Likely caffeine withdrawal; symptoms are mild. Encouraged to stay hydrated with water and let me know if headaches worsen

## 2018-06-28 NOTE — PROGRESS NOTES
Subjective:       Patient ID: Kike Smith is a 62 y.o. male.    Chief Complaint: Hypertension and Headache    61 yo male here with concern about roz home blood pressure monitor readings in the 140s/90s. He has had normal BP readings at his ENT's office. His manual BP here today is 135/75. He has eliminated lots of Cokes and diet cokes from his diet. He has had some caffeine withdrawal headaches that are minor. BP control has not been as good with recent weight gain.       Hypertension   Associated symptoms include headaches.   Headache    Pertinent negatives include no fever or sinus pressure.      does not have any pertinent problems on file.  Past Medical History:   Diagnosis Date    Benign hematuria     s/p urol/cysto    History of colon polyps 1/25/2018    He has had colon polyps since he was in his 30's.  He has had multiple colonoscopies and has had adenomatous polyps.    Hypertension      Past Surgical History:   Procedure Laterality Date    COLONOSCOPY N/A 1/25/2018    Procedure: COLONOSCOPY;  Surgeon: Juan A Walter MD;  Location: [facility];  Service: Endoscopy;  Laterality: N/A;     Family History   Problem Relation Age of Onset    Macular degeneration Maternal Uncle     Bladder Cancer Brother         smoker    Cataracts Mother      Social History     Social History    Marital status:      Spouse name: N/A    Number of children: N/A    Years of education: N/A     Occupational History    Not on file.     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 16     Review of Systems   Constitutional: Negative for activity change, appetite change, fatigue and  fever.   HENT: Negative for sinus pain and sinus pressure.    Neurological: Positive for headaches. Negative for syncope and light-headedness.   All other systems reviewed and are negative.      Objective:     Vitals:    06/28/18 1425   BP: 136/78   Pulse: 90   Temp: (!) 95.2 °F (35.1 °C)        Physical Exam   Constitutional: He is oriented to person, place, and time. He appears well-developed and well-nourished.   HENT:   Head: Normocephalic and atraumatic.   Nose: Nose normal.   Mouth/Throat: Oropharynx is clear and moist.   Eyes: Conjunctivae and EOM are normal. Pupils are equal, round, and reactive to light. No scleral icterus.   Neurological: He is alert and oriented to person, place, and time.   Normal gait.   No speech abnormality   Psychiatric: He has a normal mood and affect. His behavior is normal. Judgment and thought content normal.   Nursing note and vitals reviewed.      Assessment:       1. Nonintractable headache, unspecified chronicity pattern, unspecified headache type    2. Essential hypertension        Plan:           Problem List Items Addressed This Visit     Hypertension    Current Assessment & Plan     Manual BP ok; I think his home BP meter is not accurate. Discussed digital hypertension program; sent note to Dr. Reyna's nurse to research whether he would qualify. He is very interested.          Nonintractable headache - Primary    Current Assessment & Plan     Likely caffeine withdrawal; symptoms are mild. Encouraged to stay hydrated with water and let me know if headaches worsen           Spent 25 minutes in face to face time with >50% on counseling and educating on diet and lifestyle change to reduce blood pressure, home monitoring, and weight loss.

## 2018-06-29 ENCOUNTER — TELEPHONE (OUTPATIENT)
Dept: INTERNAL MEDICINE | Facility: CLINIC | Age: 63
End: 2018-06-29

## 2018-06-29 NOTE — TELEPHONE ENCOUNTER
Please let him know I put in the order for the digital htn program  The program should be calling him by next week please contact us

## 2018-07-19 ENCOUNTER — PATIENT MESSAGE (OUTPATIENT)
Dept: INTERNAL MEDICINE | Facility: CLINIC | Age: 63
End: 2018-07-19

## 2018-07-23 RX ORDER — ACETAZOLAMIDE 250 MG/1
TABLET ORAL
Qty: 20 TABLET | Refills: 1 | Status: SHIPPED | OUTPATIENT
Start: 2018-07-23 | End: 2019-03-26 | Stop reason: SDUPTHER

## 2018-11-08 ENCOUNTER — OFFICE VISIT (OUTPATIENT)
Dept: OPHTHALMOLOGY | Facility: CLINIC | Age: 63
End: 2018-11-08
Payer: COMMERCIAL

## 2018-11-08 DIAGNOSIS — Z01.00 VISIT FOR EYE AND VISION EXAM: Primary | ICD-10-CM

## 2018-11-08 DIAGNOSIS — H52.7 REFRACTIVE ERROR: ICD-10-CM

## 2018-11-08 DIAGNOSIS — Z13.5 GLAUCOMA SCREENING: ICD-10-CM

## 2018-11-08 PROCEDURE — 92014 COMPRE OPH EXAM EST PT 1/>: CPT | Mod: S$GLB,,, | Performed by: OPTOMETRIST

## 2018-11-08 PROCEDURE — 99999 PR PBB SHADOW E&M-EST. PATIENT-LVL II: CPT | Mod: PBBFAC,,, | Performed by: OPTOMETRIST

## 2018-11-08 PROCEDURE — 92015 DETERMINE REFRACTIVE STATE: CPT | Mod: S$GLB,,, | Performed by: OPTOMETRIST

## 2018-11-08 NOTE — PROGRESS NOTES
HPI     Last [facility] exam 11/02/2017  Hx of Posterior vitreous detachment, OD  Hx of floaters   Screening for glaucoma  RE  Not using CL's    Last edited by Fredy Eric MA on 11/8/2018  3:02 PM. (History)            Assessment /Plan     For exam results, see Encounter Report.    Visit for eye and vision exam    Glaucoma screening    Refractive error      OH OK OU.  Spec RX given.  RTC one year or prn.

## 2019-03-19 ENCOUNTER — LAB VISIT (OUTPATIENT)
Dept: LAB | Facility: HOSPITAL | Age: 64
End: 2019-03-19
Attending: FAMILY MEDICINE
Payer: COMMERCIAL

## 2019-03-19 DIAGNOSIS — Z00.00 ROUTINE HEALTH MAINTENANCE: ICD-10-CM

## 2019-03-19 LAB — COMPLEXED PSA SERPL-MCNC: 3.9 NG/ML

## 2019-03-19 PROCEDURE — 80061 LIPID PANEL: CPT

## 2019-03-19 PROCEDURE — 80053 COMPREHEN METABOLIC PANEL: CPT

## 2019-03-19 PROCEDURE — 36415 COLL VENOUS BLD VENIPUNCTURE: CPT

## 2019-03-19 PROCEDURE — 84153 ASSAY OF PSA TOTAL: CPT

## 2019-03-20 LAB
ALBUMIN SERPL BCP-MCNC: 3.9 G/DL
ALP SERPL-CCNC: 75 U/L
ALT SERPL W/O P-5'-P-CCNC: 29 U/L
ANION GAP SERPL CALC-SCNC: 6 MMOL/L
AST SERPL-CCNC: 23 U/L
BILIRUB SERPL-MCNC: 0.8 MG/DL
BUN SERPL-MCNC: 25 MG/DL
CALCIUM SERPL-MCNC: 10.1 MG/DL
CHLORIDE SERPL-SCNC: 102 MMOL/L
CHOLEST SERPL-MCNC: 138 MG/DL
CHOLEST/HDLC SERPL: 3.5 {RATIO}
CO2 SERPL-SCNC: 32 MMOL/L
CREAT SERPL-MCNC: 0.9 MG/DL
EST. GFR  (AFRICAN AMERICAN): >60 ML/MIN/1.73 M^2
EST. GFR  (NON AFRICAN AMERICAN): >60 ML/MIN/1.73 M^2
GLUCOSE SERPL-MCNC: 81 MG/DL
HDLC SERPL-MCNC: 39 MG/DL
HDLC SERPL: 28.3 %
LDLC SERPL CALC-MCNC: 86.6 MG/DL
NONHDLC SERPL-MCNC: 99 MG/DL
POTASSIUM SERPL-SCNC: 4.3 MMOL/L
PROT SERPL-MCNC: 7.4 G/DL
SODIUM SERPL-SCNC: 140 MMOL/L
TRIGL SERPL-MCNC: 62 MG/DL

## 2019-03-26 ENCOUNTER — TELEPHONE (OUTPATIENT)
Dept: INTERNAL MEDICINE | Facility: CLINIC | Age: 64
End: 2019-03-26

## 2019-03-26 ENCOUNTER — OFFICE VISIT (OUTPATIENT)
Dept: INTERNAL MEDICINE | Facility: CLINIC | Age: 64
End: 2019-03-26
Payer: COMMERCIAL

## 2019-03-26 VITALS
OXYGEN SATURATION: 98 % | WEIGHT: 214.5 LBS | DIASTOLIC BLOOD PRESSURE: 88 MMHG | TEMPERATURE: 98 F | BODY MASS INDEX: 30.71 KG/M2 | HEIGHT: 70 IN | HEART RATE: 68 BPM | SYSTOLIC BLOOD PRESSURE: 118 MMHG

## 2019-03-26 DIAGNOSIS — I10 ESSENTIAL HYPERTENSION: ICD-10-CM

## 2019-03-26 DIAGNOSIS — Z00.00 ROUTINE HEALTH MAINTENANCE: Primary | ICD-10-CM

## 2019-03-26 DIAGNOSIS — R97.20 ELEVATED PSA: ICD-10-CM

## 2019-03-26 DIAGNOSIS — R31.9 HEMATURIA, UNSPECIFIED TYPE: ICD-10-CM

## 2019-03-26 DIAGNOSIS — Z12.5 SCREENING FOR PROSTATE CANCER: ICD-10-CM

## 2019-03-26 PROCEDURE — 99396 PR PREVENTIVE VISIT,EST,40-64: ICD-10-PCS | Mod: S$GLB,,, | Performed by: FAMILY MEDICINE

## 2019-03-26 PROCEDURE — 3074F PR MOST RECENT SYSTOLIC BLOOD PRESSURE < 130 MM HG: ICD-10-PCS | Mod: CPTII,S$GLB,, | Performed by: FAMILY MEDICINE

## 2019-03-26 PROCEDURE — 99999 PR PBB SHADOW E&M-EST. PATIENT-LVL V: ICD-10-PCS | Mod: PBBFAC,,, | Performed by: FAMILY MEDICINE

## 2019-03-26 PROCEDURE — 99999 PR PBB SHADOW E&M-EST. PATIENT-LVL V: CPT | Mod: PBBFAC,,, | Performed by: FAMILY MEDICINE

## 2019-03-26 PROCEDURE — 3079F DIAST BP 80-89 MM HG: CPT | Mod: CPTII,S$GLB,, | Performed by: FAMILY MEDICINE

## 2019-03-26 PROCEDURE — 3074F SYST BP LT 130 MM HG: CPT | Mod: CPTII,S$GLB,, | Performed by: FAMILY MEDICINE

## 2019-03-26 PROCEDURE — 99396 PREV VISIT EST AGE 40-64: CPT | Mod: S$GLB,,, | Performed by: FAMILY MEDICINE

## 2019-03-26 PROCEDURE — 3079F PR MOST RECENT DIASTOLIC BLOOD PRESSURE 80-89 MM HG: ICD-10-PCS | Mod: CPTII,S$GLB,, | Performed by: FAMILY MEDICINE

## 2019-03-26 RX ORDER — ACETAZOLAMIDE 250 MG/1
TABLET ORAL
Qty: 20 TABLET | Refills: 2 | Status: SHIPPED | OUTPATIENT
Start: 2019-03-26 | End: 2019-09-03

## 2019-03-26 RX ORDER — PREDNISONE 10 MG/1
10 TABLET ORAL DAILY
Refills: 0 | COMMUNITY
Start: 2019-03-22 | End: 2019-09-03

## 2019-03-26 RX ORDER — BENZONATATE 200 MG/1
200 CAPSULE ORAL 3 TIMES DAILY PRN
Refills: 6 | COMMUNITY
Start: 2019-03-19 | End: 2019-09-03

## 2019-03-26 RX ORDER — AMOXICILLIN 875 MG/1
875 TABLET, FILM COATED ORAL EVERY 12 HOURS
Refills: 0 | COMMUNITY
Start: 2019-03-19 | End: 2019-09-03

## 2019-03-26 RX ORDER — AMOXICILLIN AND CLAVULANATE POTASSIUM 875; 125 MG/1; MG/1
1 TABLET, FILM COATED ORAL EVERY 12 HOURS
Refills: 0 | COMMUNITY
Start: 2019-03-19 | End: 2019-09-03

## 2019-03-26 RX ORDER — MINERAL OIL
180 ENEMA (ML) RECTAL DAILY
COMMUNITY
End: 2019-09-03

## 2019-03-26 NOTE — PROGRESS NOTES
Subjective:       Patient ID: Kike Smith is a  male.    Chief Complaint: Annual Exam    HPIhere for phys exam no c/o;htn controlled and nl bps;baudilio statin; baudilio statin    fam hx bladder ca/hematuria nl w./u(brother bladder ca /smoker) : nl hmaturia w/u yrs ago. Would like to monitor for inc hematuria. No gross hematuria;recent ua inc microscopic hematuria    stable psa ; hx intermitt borderline elev in past. No bph sympt    rcovering from sinus infxn w 3 weeks two abx via dr wick    No headaches    Past Medical History   Diagnosis Date    Hypertension     Benign hematuria      s/p urol/cysto     History reviewed. No pertinent past surgical history.  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         Review of Systems  Cardiovascular: no chest pain  Chest: no shortness of breath  Abd: no abd pain  Remainder review of systems negative    Objective:      Physical Exam   Constitutional: He is oriented to person, place, and time. He appears well-developed and well-nourished.   HENT:   Head: Normocephalic and atraumatic.   Right Ear: External ear normal.   Left Ear: External ear normal.   Nose: Nose normal.   Mouth/Throat: Oropharynx is clear and moist.   Nl tms   Eyes: Conjunctivae and EOM are normal. Pupils are equal, round, and reactive to light. No scleral icterus.   Neck: Normal range of motion. Neck supple. Carotid bruit is not present.   Cardiovascular: Normal rate,  regular rhythm and normal heart sounds.  Exam reveals no gallop and no friction rub.    No murmur heard.  Pulmonary/Chest: Effort normal and breath sounds normal. He has no wheezes.   Abdominal: Soft. Bowel sounds are normal. He exhibits no distension and no mass. There is no hepatosplenomegaly. There is no tenderness. There is no rebound and no guarding.   Musculoskeletal: Normal range of motion. He exhibits no tenderness.   Lymphadenopathy:     He has no cervical adenopathy.   Neurological: He is alert and oriented to person, place, and time. No cranial nerve deficit. Coordination normal.   Skin: Skin is warm and dry. No rash noted. No erythema.   Psychiatric: He has a normal mood and affect. His behavior is normal. Judgment and thought content normal.   Nursing note and vitals reviewed.  haider mod prost no nodules    Assessment:       1. Routine health maintenance    Borderline psa  fam hx bladder ca/hematuria inc  Plan:       urol for inc hematuria/famhx bladder ca/borderline psa  Lab 12 months and follow up after  D/wd +/- asa can stop      Other orders  -     acetaZOLAMIDE (DIAMOX) 250 MG tablet; take 1 tablet by mouth twice a day 24 HOURS BEFORE ASCENT AN  Dispense: 20 tablet; Refill: 2; going to [facility]  Shingrix new shingles vaccine  via a pharmacy        ADD: I spoke with dr stoner 7/2020  and says unless big inc psa no need to recheck sooner than planned ; will d/w at f/u

## 2019-03-26 NOTE — TELEPHONE ENCOUNTER
----- Message from Amie Leung sent at 3/26/2019  3:52 PM CDT -----  Contact: Roselyn [facility]   Type:  Pharmacy Calling to Clarify an RX    Name of Caller: Roselyn   [facility]   Prescription Name:Diamox   What do they need to clarify?:Fed codes/ Directions   Best Call Back Number:[phone]  Additional Information:

## 2019-03-26 NOTE — TELEPHONE ENCOUNTER
Spoke with pharmacist who advised this has already been clarified and patient picked up prescription

## 2019-03-27 ENCOUNTER — OFFICE VISIT (OUTPATIENT)
Dept: UROLOGY | Facility: CLINIC | Age: 64
End: 2019-03-27
Payer: COMMERCIAL

## 2019-03-27 VITALS
DIASTOLIC BLOOD PRESSURE: 84 MMHG | HEART RATE: 76 BPM | WEIGHT: 217.13 LBS | SYSTOLIC BLOOD PRESSURE: 132 MMHG | HEIGHT: 70 IN | BODY MASS INDEX: 31.09 KG/M2

## 2019-03-27 DIAGNOSIS — R31.9 HEMATURIA, UNSPECIFIED TYPE: ICD-10-CM

## 2019-03-27 LAB
BILIRUB SERPL-MCNC: NORMAL MG/DL
BLOOD URINE, POC: NORMAL
COLOR, POC UA: YELLOW
GLUCOSE UR QL STRIP: NORMAL
KETONES UR QL STRIP: NORMAL
LEUKOCYTE ESTERASE URINE, POC: NORMAL
NITRITE, POC UA: NORMAL
PH, POC UA: 7
PROTEIN, POC: NORMAL
SPECIFIC GRAVITY, POC UA: 1
UROBILINOGEN, POC UA: NORMAL

## 2019-03-27 PROCEDURE — 3075F PR MOST RECENT SYSTOLIC BLOOD PRESS GE 130-139MM HG: ICD-10-PCS | Mod: CPTII,S$GLB,, | Performed by: UROLOGY

## 2019-03-27 PROCEDURE — 3079F PR MOST RECENT DIASTOLIC BLOOD PRESSURE 80-89 MM HG: ICD-10-PCS | Mod: CPTII,S$GLB,, | Performed by: UROLOGY

## 2019-03-27 PROCEDURE — 99999 PR PBB SHADOW E&M-EST. PATIENT-LVL III: CPT | Mod: PBBFAC,,, | Performed by: UROLOGY

## 2019-03-27 PROCEDURE — 3079F DIAST BP 80-89 MM HG: CPT | Mod: CPTII,S$GLB,, | Performed by: UROLOGY

## 2019-03-27 PROCEDURE — 3008F BODY MASS INDEX DOCD: CPT | Mod: CPTII,S$GLB,, | Performed by: UROLOGY

## 2019-03-27 PROCEDURE — 3008F PR BODY MASS INDEX (BMI) DOCUMENTED: ICD-10-PCS | Mod: CPTII,S$GLB,, | Performed by: UROLOGY

## 2019-03-27 PROCEDURE — 99214 OFFICE O/P EST MOD 30 MIN: CPT | Mod: 25,S$GLB,, | Performed by: UROLOGY

## 2019-03-27 PROCEDURE — 81002 URINALYSIS NONAUTO W/O SCOPE: CPT | Mod: S$GLB,,, | Performed by: UROLOGY

## 2019-03-27 PROCEDURE — 81002 POCT URINE DIPSTICK WITHOUT MICROSCOPE: ICD-10-PCS | Mod: S$GLB,,, | Performed by: UROLOGY

## 2019-03-27 PROCEDURE — 99999 PR PBB SHADOW E&M-EST. PATIENT-LVL III: ICD-10-PCS | Mod: PBBFAC,,, | Performed by: UROLOGY

## 2019-03-27 PROCEDURE — 99214 PR OFFICE/OUTPT VISIT, EST, LEVL IV, 30-39 MIN: ICD-10-PCS | Mod: 25,S$GLB,, | Performed by: UROLOGY

## 2019-03-27 PROCEDURE — 3075F SYST BP GE 130 - 139MM HG: CPT | Mod: CPTII,S$GLB,, | Performed by: UROLOGY

## 2019-03-27 NOTE — PROGRESS NOTES
Chief Complaint: Epididymal cyst?    HPI:   3/27/19: Returns for microhematuria.  Longstanding problem.  No workup for same in last ten years.  No obvious known causes.  Brother dx with bladder cancer.  Dr. Reyna checked prostate yesterday.  12/4/17: 63 yo man had a pain arise in the right testicle like an ache this summer.  Was treated on presumption of orchitis and pretty much went away.  Repeated last month same story.  Scrotal US 7/17 shows some very small inconsequential epididymal cysts. Strained his back lately and that was very painful and couldn't feel any testicle problems during that episode.  Has a mild right testicular discomfort when he thinks about it. Might go days without thinking about it. No abd/pelvic pain and no exac/rel factors.  No hematuria.  No urolithiasis.  No urinary bother.  Past history of idiopathic microhematuria, none today.  Normal sexual function.  Brother had bladder cancer treated age 62; has a neobladder.  Referred by Dr. Castro.    Allergies:  Patient has no known allergies.    Medications:  has a current medication list which includes the following prescription(s): acetazolamide, amoxicillin, amoxicillin-clavulanate 875-125mg, azelastine, benzonatate, fexofenadine, fluticasone, hydrochlorothiazide, pravastatin, prednisone, and aspirin.    Review of Systems:  General: No fever, chills, fatigability, or weight loss.  Skin: No rashes, itching, or changes in color or texture of skin.  Chest: Denies RAINES, cyanosis, wheezing, cough, and sputum production.  Abdomen: Appetite fine. No weight loss. Denies diarrhea, abdominal pain, hematemesis, or blood in stool.  Musculoskeletal: No joint stiffness or swelling. Denies back pain.  : As above.  All other review of systems negative.    PMH:   has a past medical history of Allergy, Benign hematuria, History of colon polyps (1/25/2018), Hyperlipidemia, and Hypertension.    PSH:   has a past surgical history that includes Colonoscopy (N/A,  1/25/2018) and Knee arthroscopy.    FamHx: family history includes Bladder Cancer in his brother; Cataracts in his mother; Coronary artery disease in his father; Fanconi anemia in his mother; Heart attack (age of onset: 72) in his father; Heart disease in his father; Leukemia in his father; Macular degeneration in his maternal uncle; Multiple myeloma in his sister.    SocHx:  reports that he has never smoked. He has never used smokeless tobacco. He reports that he does not drink alcohol or use drugs.      Physical Exam:  Vitals:    03/27/19 0822   BP: 132/84   Pulse: 76     General: A&Ox3, no apparent distress, no deformities  Neck: No masses, normal thyroid  Lungs: normal inspiration, no use of accessory muscles  Heart: normal pulse, no arrhythmias  Abdomen: Soft, NT, ND  Skin: The skin is warm and dry. No jaundice.  Ext: No c/c/e.  :   3/27/19: Test desc dina, no abnormalities of epididymus.  Right testicle somewhat tender but not much. Penis normal, with normal penile and scrotal skin. Meatus normal. KAREN was normal with PCP this year.    Labs/Studies:   PSA    9/17: 3.7    3/19: 3.9    Impression/Plan:   1. CT Urogram and RTC cysto.

## 2019-03-27 NOTE — LETTER
March 27, 2019      Anmol Reyna MD  [address] [facility]  [address] [place] [address]  Phone: [phone]  Fax: [phone]          Patient: Kike Smith   MR Number: [id number]   YOB: 1955   Date of Visit: 3/27/2019       Dear Dr. Anmol Reyna:    Thank you for referring Kike Smith to me for evaluation. Attached you will find relevant portions of my assessment and plan of care.    If you have questions, please do not hesitate to call me. I look forward to following Kike Smith along with you.    Sincerely,    David Hobson IV, MD    Enclosure  CC:  No Recipients    If you would like to receive this communication electronically, please contact [email or web address] or [phone] to request more information on [employer] access.    For providers and/or their staff who would like to refer a patient to [facility], please contact us through our one-stop-shop provider referral line, [facility], at [phone].    If you feel you have received this communication in error or would no longer like to receive these types of communications, please e-mail [email or web address]

## 2019-04-01 ENCOUNTER — TELEPHONE (OUTPATIENT)
Dept: RADIOLOGY | Facility: HOSPITAL | Age: 64
End: 2019-04-01

## 2019-04-02 ENCOUNTER — HOSPITAL ENCOUNTER (OUTPATIENT)
Dept: RADIOLOGY | Facility: HOSPITAL | Age: 64
Discharge: HOME | End: 2019-04-02
Attending: UROLOGY
Payer: COMMERCIAL

## 2019-04-02 DIAGNOSIS — R31.9 HEMATURIA, UNSPECIFIED TYPE: ICD-10-CM

## 2019-04-02 PROCEDURE — 74178 CT UROGRAM ABD PELVIS W WO: ICD-10-PCS | Mod: 26,,, | Performed by: RADIOLOGY

## 2019-04-02 PROCEDURE — 74178 CT ABD&PLV WO CNTR FLWD CNTR: CPT | Mod: TC

## 2019-04-02 PROCEDURE — 25500020 PHARM REV CODE 255: Performed by: UROLOGY

## 2019-04-02 PROCEDURE — 74178 CT ABD&PLV WO CNTR FLWD CNTR: CPT | Mod: 26,,, | Performed by: RADIOLOGY

## 2019-04-02 RX ORDER — PRAVASTATIN SODIUM 40 MG/1
TABLET ORAL
Qty: 90 TABLET | Refills: 3 | Status: SHIPPED | OUTPATIENT
Start: 2019-04-02 | End: 2019-09-03

## 2019-04-02 RX ADMIN — IOHEXOL 150 ML: 350 INJECTION, SOLUTION INTRAVENOUS at 09:04

## 2019-04-03 ENCOUNTER — TELEPHONE (OUTPATIENT)
Dept: UROLOGY | Facility: CLINIC | Age: 64
End: 2019-04-03

## 2019-04-03 NOTE — TELEPHONE ENCOUNTER
----- Message from Maty Coats sent at 4/3/2019  9:02 AM CDT -----  Contact: Pt  Please give pt a call at ..[phone] (home) [phone] (work)regarding some questions regarding his upcoming procedure and wants to see if he can have it sooner

## 2019-04-03 NOTE — TELEPHONE ENCOUNTER
Returned pt's call, explained that Dr. Hobson will review CT results with him during his visit on 4/9 & that date is soonest available. Pt verbalized understanding.

## 2019-04-09 ENCOUNTER — OFFICE VISIT (OUTPATIENT)
Dept: UROLOGY | Facility: CLINIC | Age: 64
End: 2019-04-09
Payer: COMMERCIAL

## 2019-04-09 VITALS — BODY MASS INDEX: 31.07 KG/M2 | WEIGHT: 217 LBS | HEIGHT: 70 IN

## 2019-04-09 DIAGNOSIS — N40.0 BENIGN PROSTATIC HYPERPLASIA, UNSPECIFIED WHETHER LOWER URINARY TRACT SYMPTOMS PRESENT: Primary | ICD-10-CM

## 2019-04-09 DIAGNOSIS — R31.9 HEMATURIA, UNSPECIFIED TYPE: ICD-10-CM

## 2019-04-09 LAB
BILIRUB SERPL-MCNC: NORMAL MG/DL
BLOOD URINE, POC: NORMAL
COLOR, POC UA: YELLOW
GLUCOSE UR QL STRIP: NORMAL
KETONES UR QL STRIP: NORMAL
LEUKOCYTE ESTERASE URINE, POC: NORMAL
NITRITE, POC UA: NORMAL
PH, POC UA: 6
PROTEIN, POC: NORMAL
SPECIFIC GRAVITY, POC UA: 1.01
UROBILINOGEN, POC UA: NORMAL

## 2019-04-09 PROCEDURE — 52000 CYSTOURETHROSCOPY: CPT | Mod: S$GLB,,, | Performed by: UROLOGY

## 2019-04-09 PROCEDURE — 81002 URINALYSIS NONAUTO W/O SCOPE: CPT | Mod: S$GLB,,, | Performed by: UROLOGY

## 2019-04-09 PROCEDURE — 99499 UNLISTED E&M SERVICE: CPT | Mod: S$GLB,,, | Performed by: UROLOGY

## 2019-04-09 PROCEDURE — 99499 NO LOS: ICD-10-PCS | Mod: S$GLB,,, | Performed by: UROLOGY

## 2019-04-09 PROCEDURE — 99999 PR PBB SHADOW E&M-EST. PATIENT-LVL III: ICD-10-PCS | Mod: PBBFAC,,, | Performed by: UROLOGY

## 2019-04-09 PROCEDURE — 81002 POCT URINE DIPSTICK WITHOUT MICROSCOPE: ICD-10-PCS | Mod: S$GLB,,, | Performed by: UROLOGY

## 2019-04-09 PROCEDURE — 52000 PR CYSTOURETHROSCOPY: ICD-10-PCS | Mod: S$GLB,,, | Performed by: UROLOGY

## 2019-04-09 PROCEDURE — 99999 PR PBB SHADOW E&M-EST. PATIENT-LVL III: CPT | Mod: PBBFAC,,, | Performed by: UROLOGY

## 2019-04-09 RX ORDER — TAMSULOSIN HYDROCHLORIDE 0.4 MG/1
0.4 CAPSULE ORAL DAILY
Qty: 30 CAPSULE | Refills: 11 | Status: SHIPPED | OUTPATIENT
Start: 2019-04-09 | End: 2020-04-08

## 2019-04-09 NOTE — PROGRESS NOTES
Chief Complaint: Hematuria    HPI:   4/9/19: Cysto today shows no sig finding other than mild BPH, tiny prostatic mucosa calcifications.  CT normal except some benign left renal cysts.  3/27/19: Returns for microhematuria.  Longstanding problem.  No workup for same in last ten years.  No obvious known causes.  Brother dx with bladder cancer.  Dr. Reyna checked prostate yesterday.  12/4/17: 61 yo man had a pain arise in the right testicle like an ache this summer.  Was treated on presumption of orchitis and pretty much went away.  Repeated last month same story.  Scrotal US 7/17 shows some very small inconsequential epididymal cysts. Strained his back lately and that was very painful and couldn't feel any testicle problems during that episode.  Has a mild right testicular discomfort when he thinks about it. Might go days without thinking about it. No abd/pelvic pain and no exac/rel factors.  No hematuria.  No urolithiasis.  No urinary bother.  Past history of idiopathic microhematuria, none today.  Normal sexual function.  Brother had bladder cancer treated age 62; has a neobladder.  Referred by Dr. Castro.    Allergies:  Patient has no known allergies.    Medications:  has a current medication list which includes the following prescription(s): acetazolamide, amoxicillin, amoxicillin-clavulanate 875-125mg, aspirin, azelastine, benzonatate, fexofenadine, fluticasone, hydrochlorothiazide, pravastatin, and prednisone.    Review of Systems:  General: No fever, chills, fatigability, or weight loss.  Skin: No rashes, itching, or changes in color or texture of skin.  Chest: Denies RAINES, cyanosis, wheezing, cough, and sputum production.  Abdomen: Appetite fine. No weight loss. Denies diarrhea, abdominal pain, hematemesis, or blood in stool.  Musculoskeletal: No joint stiffness or swelling. Denies back pain.  : As above.  All other review of systems negative.    PMH:   has a past medical history of Allergy, Benign  hematuria, History of colon polyps (1/25/2018), Hyperlipidemia, and Hypertension.    PSH:   has a past surgical history that includes Colonoscopy (N/A, 1/25/2018) and Knee arthroscopy.    FamHx: family history includes Bladder Cancer in his brother; Cataracts in his mother; Coronary artery disease in his father; Fanconi anemia in his mother; Heart attack (age of onset: 72) in his father; Heart disease in his father; Leukemia in his father; Macular degeneration in his maternal uncle; Multiple myeloma in his sister.    SocHx:  reports that he has never smoked. He has never used smokeless tobacco. He reports that he does not drink alcohol or use drugs.      Physical Exam:  There were no vitals filed for this visit.  General: A&Ox3, no apparent distress, no deformities  Neck: No masses, normal thyroid  Lungs: normal inspiration, no use of accessory muscles  Heart: normal pulse, no arrhythmias  Abdomen: Soft, NT, ND  Skin: The skin is warm and dry. No jaundice.  Ext: No c/c/e.  :   3/27/19: Test desc dina, no abnormalities of epididymus.  Right testicle somewhat tender but not much. Penis normal, with normal penile and scrotal skin. Meatus normal. KAREN was normal with PCP this year.    Labs/Studies:   PSA    9/17: 3.7    3/19: 3.9    Procedure: Diagnostic Cystoscopy    Procedure in Detail: After proper consents were obtained, the patient was prepped and draped in normal sterile fashion for diagnostic cystoscopy. 5 ml of lidocaine jelly was instilled in the urethra. The flexible cystoscope was then introduced into the urethra, and advanced into the bladder under direct vision. The urethral mucosa appeared normal, and no strictures were noted. The sphincter appeared to be normal, and the veru montanum was unremarkable. The prostatic mucosa and the lateral lobes of the prostate were milsly opposed with a slight elevated bladder neck. The bladder neck was normal. Inspection of the interior of the bladder was then carried  out. The trigone was unremarkable, with no mucosal lesions. The ureteral orifices were normal in position and configuration. Systematic inspection of the mucosa of the bladder it was then carried out, rotating the cystoscope so that all areas of the left and right lateral walls, the dome of the bladder, and the posterior wall were all visualized. The cystoscope was then advanced further into the bladder, and maximum deflection of the scope was performed so that the bladder neck could be inspected. No mucosal lesions were noted there. The cystoscope was then removed, and the procedure terminated.     Findings: Normal cysto except mild BPH, small calcifications of prostatic mucosa    Impression/Plan:   1. Mild BPH may be causing some microhematuria.  Reassured.  PSA/US/RTC 1 year.  2. Trial of flomax may help with voiding.

## 2019-04-29 ENCOUNTER — PATIENT MESSAGE (OUTPATIENT)
Dept: INTERNAL MEDICINE | Facility: CLINIC | Age: 64
End: 2019-04-29

## 2019-05-28 RX ORDER — HYDROCHLOROTHIAZIDE 25 MG/1
TABLET ORAL
Qty: 90 TABLET | Refills: 3 | Status: SHIPPED | OUTPATIENT
Start: 2019-05-28 | End: 2020-06-16

## 2019-09-03 ENCOUNTER — OFFICE VISIT (OUTPATIENT)
Dept: INTERNAL MEDICINE | Facility: CLINIC | Age: 64
End: 2019-09-03
Payer: COMMERCIAL

## 2019-09-03 VITALS
DIASTOLIC BLOOD PRESSURE: 84 MMHG | TEMPERATURE: 97 F | WEIGHT: 204.38 LBS | HEIGHT: 70 IN | OXYGEN SATURATION: 99 % | SYSTOLIC BLOOD PRESSURE: 118 MMHG | HEART RATE: 81 BPM | BODY MASS INDEX: 29.26 KG/M2

## 2019-09-03 DIAGNOSIS — S82.202A CLOSED FRACTURE OF LEFT TIBIA AND FIBULA, INITIAL ENCOUNTER: ICD-10-CM

## 2019-09-03 DIAGNOSIS — R93.89 ABNORMAL CT OF THE CHEST: Primary | ICD-10-CM

## 2019-09-03 DIAGNOSIS — S82.402A CLOSED FRACTURE OF LEFT TIBIA AND FIBULA, INITIAL ENCOUNTER: ICD-10-CM

## 2019-09-03 PROCEDURE — 99213 OFFICE O/P EST LOW 20 MIN: CPT | Mod: S$GLB,,, | Performed by: NURSE PRACTITIONER

## 2019-09-03 PROCEDURE — 99999 PR PBB SHADOW E&M-EST. PATIENT-LVL IV: ICD-10-PCS | Mod: PBBFAC,,, | Performed by: NURSE PRACTITIONER

## 2019-09-03 PROCEDURE — 3074F SYST BP LT 130 MM HG: CPT | Mod: CPTII,S$GLB,, | Performed by: NURSE PRACTITIONER

## 2019-09-03 PROCEDURE — 3008F BODY MASS INDEX DOCD: CPT | Mod: CPTII,S$GLB,, | Performed by: NURSE PRACTITIONER

## 2019-09-03 PROCEDURE — 3074F PR MOST RECENT SYSTOLIC BLOOD PRESSURE < 130 MM HG: ICD-10-PCS | Mod: CPTII,S$GLB,, | Performed by: NURSE PRACTITIONER

## 2019-09-03 PROCEDURE — 3079F DIAST BP 80-89 MM HG: CPT | Mod: CPTII,S$GLB,, | Performed by: NURSE PRACTITIONER

## 2019-09-03 PROCEDURE — 99999 PR PBB SHADOW E&M-EST. PATIENT-LVL IV: CPT | Mod: PBBFAC,,, | Performed by: NURSE PRACTITIONER

## 2019-09-03 PROCEDURE — 99213 PR OFFICE/OUTPT VISIT, EST, LEVL III, 20-29 MIN: ICD-10-PCS | Mod: S$GLB,,, | Performed by: NURSE PRACTITIONER

## 2019-09-03 PROCEDURE — 3079F PR MOST RECENT DIASTOLIC BLOOD PRESSURE 80-89 MM HG: ICD-10-PCS | Mod: CPTII,S$GLB,, | Performed by: NURSE PRACTITIONER

## 2019-09-03 PROCEDURE — 3008F PR BODY MASS INDEX (BMI) DOCUMENTED: ICD-10-PCS | Mod: CPTII,S$GLB,, | Performed by: NURSE PRACTITIONER

## 2019-09-03 RX ORDER — EPINEPHRINE 0.3 MG/.3ML
INJECTION SUBCUTANEOUS
COMMUNITY
End: 2022-04-21

## 2019-09-03 RX ORDER — CIPROFLOXACIN 500 MG/1
TABLET ORAL
Refills: 0 | COMMUNITY
Start: 2019-07-03 | End: 2020-06-18

## 2019-09-03 RX ORDER — ACETAMINOPHEN 500 MG
1 TABLET ORAL DAILY
COMMUNITY

## 2019-09-03 RX ORDER — ASPIRIN 325 MG
325 TABLET ORAL DAILY
COMMUNITY
End: 2020-06-18

## 2019-09-03 NOTE — PROGRESS NOTES
Subjective:       Patient ID: Kike Smith is a 63 y.o. male.    Chief Complaint: Follow-up    Patient presents for additional imaging after an abnormal CT contrast at [facility].  Patient fractured left tib/fib while in [place].  Had orthopedic surgery in [place].    Followed up in [place] with ortho Dr. Soler.  On follow up, patient reports some SOB on exertion.  Had ultrasound of LE's and CT scan of chest to r/o blood clot.  Chest CT was abnormal.  Can not rule out PE.  Recommended perfusion/ventilation scan.  Patient noticed over the weekend that shortness of breath has improved.  Ortho will not move forward with PT until patient is cleared for PE.      Shortness of Breath   This is a new problem. The current episode started 1 to 4 weeks ago. The problem occurs daily. The problem has been unchanged. The average episode lasts 90 seconds. Associated symptoms include claudication and leg swelling. Pertinent negatives include no abdominal pain, chest pain, coryza, ear pain, fever, headaches, hemoptysis, leg pain, neck pain, orthopnea, PND, rash, rhinorrhea, sore throat, sputum production, swollen glands, syncope, vomiting or wheezing. The symptoms are aggravated by exercise. Risk factors include recent leg injury. The treatment provided no relief. His past medical history is significant for a recent surgery. There is no history of allergies, aspirin allergies, asthma, bronchiolitis, CAD, chronic lung disease, COPD, DVT, a heart failure, PE or pneumonia.     Review of Systems   Constitutional: Negative for chills and fever.   HENT: Negative for ear pain, rhinorrhea and sore throat.    Respiratory: Positive for shortness of breath. Negative for hemoptysis, sputum production and wheezing.    Cardiovascular: Positive for claudication and leg swelling. Negative for chest pain, orthopnea, syncope and PND.   Gastrointestinal: Negative for abdominal pain and vomiting.   Musculoskeletal: Positive for gait  problem and joint swelling. Negative for neck pain.   Skin: Negative for rash.   Neurological: Negative for headaches.   Psychiatric/Behavioral: Negative for agitation and confusion.       Objective:      Physical Exam   Constitutional: He is oriented to person, place, and time. Vital signs are normal. He appears well-developed and well-nourished.   HENT:   Head: Normocephalic and atraumatic.   Neck: Normal range of motion.   Cardiovascular: Normal rate and regular rhythm.   Pulmonary/Chest: Effort normal and breath sounds normal.   Musculoskeletal: Normal range of motion. He exhibits edema (left LE).   Neurological: He is alert and oriented to person, place, and time.   Skin: Skin is warm. He is not diaphoretic.   Psychiatric: He has a normal mood and affect. His behavior is normal.       Assessment:       1. Abnormal CT of the chest    2. Closed fracture of left tibia and fibula, initial encounter        Plan:         Abnormal CT of the chest  -     NM Lung Scan Ventilation Perfusion; Future; Expected date: 09/03/2019    Closed fracture of left tibia and fibula, initial encounter      Reviewed imaging report.  Will order ventilation and perfusion scan.  Will inform of report and send to ortho.

## 2019-09-05 ENCOUNTER — HOSPITAL ENCOUNTER (OUTPATIENT)
Dept: RADIOLOGY | Facility: HOSPITAL | Age: 64
Discharge: HOME | End: 2019-09-05
Attending: NURSE PRACTITIONER
Payer: COMMERCIAL

## 2019-09-05 DIAGNOSIS — R93.89 ABNORMAL CT OF THE CHEST: ICD-10-CM

## 2019-09-05 DIAGNOSIS — R93.89 ABNORMAL CT OF THE CHEST: Primary | ICD-10-CM

## 2019-09-05 PROCEDURE — 71046 X-RAY EXAM CHEST 2 VIEWS: CPT | Mod: TC

## 2019-09-05 PROCEDURE — 78582 LUNG VENTILAT&PERFUS IMAGING: CPT | Mod: TC

## 2019-09-09 ENCOUNTER — TELEPHONE (OUTPATIENT)
Dept: INTERNAL MEDICINE | Facility: CLINIC | Age: 64
End: 2019-09-09

## 2019-09-09 NOTE — TELEPHONE ENCOUNTER
----- Message from Venita Montanez sent at 9/9/2019 10:15 AM CDT -----  Contact: Kwadwo Daniels  Requesting a call back regarding clearance for patient to receive PT. Please call back at [phone]

## 2019-09-09 NOTE — TELEPHONE ENCOUNTER
----- Message from Laney Lyman sent at 9/9/2019 12:18 PM CDT -----  Contact: Breonna trevino/   Type:  Patient Returning Call    Who Called: Mirela  Who Left Message for Patient: unknown  Does the patient know what this is regarding?: no  Would the patient rather a call back or a response via [facility]? Call back  Best Call Back Number: [phone]  Additional Information: n/a

## 2019-09-09 NOTE — TELEPHONE ENCOUNTER
Left message again for Breonna to call back also left fax number to have orders fax since requesting clearance for PT

## 2019-09-10 ENCOUNTER — TELEPHONE (OUTPATIENT)
Dept: URGENT CARE | Facility: CLINIC | Age: 64
End: 2019-09-10

## 2019-09-10 ENCOUNTER — TELEPHONE (OUTPATIENT)
Dept: INTERNAL MEDICINE | Facility: CLINIC | Age: 64
End: 2019-09-10

## 2019-09-10 NOTE — LETTER
September 10, 2019    Re:  Kike Smith (1955)               [facility]  [address] [address] [address]  Phone: [phone]  Fax: [phone] Mr. Kike Smith is ok to move forward with physical therapy.  Recent VQ scan (9/5/2019) is negative for pulmonary embolus.      Feel free to contact us with any questions.               MARCIA RushC

## 2019-09-10 NOTE — TELEPHONE ENCOUNTER
Breonna is requesting something in writing stating patient is cleared for PT.  Will save letter to fax over.  Please fax to Dr. Chin (Attention: Breonna) to [phone]

## 2019-09-10 NOTE — TELEPHONE ENCOUNTER
I contacted the pt and informed him that I had faxed his order to the provider at [facility] and will fax again to Breonna at [phone]. He verbalized understanding. //kah

## 2019-09-10 NOTE — TELEPHONE ENCOUNTER
----- Message from Rona Eric sent at 9/10/2019 12:55 PM CDT -----  Contact: Breonna/Dr. Stephen Ravi is calling to see if the office received the fax that she sent on 9/9/19 and today 9/10/19, Please call her at [phone].    Thanks  Td

## 2019-09-10 NOTE — TELEPHONE ENCOUNTER
----- Message from Rona Eric sent at 9/10/2019 12:55 PM CDT -----  Contact: Breonna/Dr. Stephen Ravi is calling to see if the office received the fax that she sent on 9/9/19 and today 9/10/19, Please call her at [phone].    Thanks  Td

## 2019-09-10 NOTE — TELEPHONE ENCOUNTER
----- Message from Yvonne Gomez sent at 9/10/2019  1:05 PM CDT -----  Contact: self  needs call back regarding status of 9/5 report being sent to dr bowen..[phone]

## 2019-11-14 ENCOUNTER — OFFICE VISIT (OUTPATIENT)
Dept: OPHTHALMOLOGY | Facility: CLINIC | Age: 64
End: 2019-11-14
Payer: COMMERCIAL

## 2019-11-14 DIAGNOSIS — H43.391 VITREOUS FLOATERS OF RIGHT EYE: ICD-10-CM

## 2019-11-14 DIAGNOSIS — H43.811 POSTERIOR VITREOUS DETACHMENT OF RIGHT EYE: Primary | ICD-10-CM

## 2019-11-14 DIAGNOSIS — Z13.5 GLAUCOMA SCREENING: ICD-10-CM

## 2019-11-14 DIAGNOSIS — H52.7 REFRACTIVE ERROR: ICD-10-CM

## 2019-11-14 PROCEDURE — 92014 PR EYE EXAM, EST PATIENT,COMPREHESV: ICD-10-PCS | Mod: S$GLB,,, | Performed by: OPTOMETRIST

## 2019-11-14 PROCEDURE — 92014 COMPRE OPH EXAM EST PT 1/>: CPT | Mod: S$GLB,,, | Performed by: OPTOMETRIST

## 2019-11-14 PROCEDURE — 92015 DETERMINE REFRACTIVE STATE: CPT | Mod: S$GLB,,, | Performed by: OPTOMETRIST

## 2019-11-14 PROCEDURE — 92015 PR REFRACTION: ICD-10-PCS | Mod: S$GLB,,, | Performed by: OPTOMETRIST

## 2019-11-14 PROCEDURE — 99999 PR PBB SHADOW E&M-EST. PATIENT-LVL II: CPT | Mod: PBBFAC,,, | Performed by: OPTOMETRIST

## 2019-11-14 PROCEDURE — 99999 PR PBB SHADOW E&M-EST. PATIENT-LVL II: ICD-10-PCS | Mod: PBBFAC,,, | Performed by: OPTOMETRIST

## 2019-11-14 RX ORDER — PRAVASTATIN SODIUM 40 MG/1
TABLET ORAL
COMMUNITY
End: 2020-04-30

## 2019-11-14 RX ORDER — AZELASTINE 1 MG/ML
1 SPRAY, METERED NASAL 2 TIMES DAILY
COMMUNITY

## 2019-11-14 NOTE — PROGRESS NOTES
HPI     Last [facility] exam 11/08/2018  Screening for glaucoma  RE  Hx of floaters  Posterior vitreous detachment, OD    Last edited by Fredy Eric MA on 11/14/2019  2:54 PM. (History)            Assessment /Plan     For exam results, see Encounter Report.    Posterior vitreous detachment of right eye    Vitreous floaters of right eye    Glaucoma screening    Refractive error      Old PVD/floaters OD without retinal findings.  OH OK OU.  Spec and CL Rx given.  RTC one year.

## 2020-04-08 RX ORDER — TAMSULOSIN HYDROCHLORIDE 0.4 MG/1
CAPSULE ORAL
Qty: 30 CAPSULE | Refills: 11 | Status: SHIPPED | OUTPATIENT
Start: 2020-04-08 | End: 2020-07-09 | Stop reason: SDUPTHER

## 2020-04-30 RX ORDER — PRAVASTATIN SODIUM 40 MG/1
TABLET ORAL
Qty: 90 TABLET | Refills: 1 | Status: SHIPPED | OUTPATIENT
Start: 2020-04-30 | End: 2020-11-03

## 2020-05-20 ENCOUNTER — TELEPHONE (OUTPATIENT)
Dept: INTERNAL MEDICINE | Facility: CLINIC | Age: 65
End: 2020-05-20

## 2020-05-20 NOTE — TELEPHONE ENCOUNTER
----- Message from Car Tadeo sent at 5/20/2020 11:39 AM CDT -----  Contact: Pt   Pt called in regards to getting order for PSA, SCREENING and LIPID PANEL. Pt can be reached at [phone] ([email or web address]).

## 2020-05-20 NOTE — TELEPHONE ENCOUNTER
I returned a call to the pt and he wanted to have his lab and US appt rescheduled for [facility]. I assisted him with scheduling. //kah

## 2020-05-20 NOTE — TELEPHONE ENCOUNTER
----- Message from Car Tadeo sent at 5/20/2020 11:31 AM CDT -----  Contact: Pt   Pt called in regards to getting orders for blood work. Pt is asking to have order completed prior to 06/01/2020 appointment.  Pt can be reached at [phone] ([email or web address])

## 2020-06-18 ENCOUNTER — OFFICE VISIT (OUTPATIENT)
Dept: DERMATOLOGY | Facility: CLINIC | Age: 65
End: 2020-06-18
Payer: COMMERCIAL

## 2020-06-18 DIAGNOSIS — L82.1 SEBORRHEIC KERATOSIS: Primary | ICD-10-CM

## 2020-06-18 DIAGNOSIS — L57.0 ACTINIC KERATOSES: ICD-10-CM

## 2020-06-18 DIAGNOSIS — D48.5 NEOPLASM OF UNCERTAIN BEHAVIOR OF SKIN: ICD-10-CM

## 2020-06-18 PROCEDURE — 11102 TANGNTL BX SKIN SINGLE LES: CPT | Mod: S$GLB,,, | Performed by: DERMATOLOGY

## 2020-06-18 PROCEDURE — 88342 CHG IMMUNOCYTOCHEMISTRY: ICD-10-PCS | Mod: 26,,, | Performed by: PATHOLOGY

## 2020-06-18 PROCEDURE — 99999 PR PBB SHADOW E&M-EST. PATIENT-LVL III: ICD-10-PCS | Mod: PBBFAC,,, | Performed by: DERMATOLOGY

## 2020-06-18 PROCEDURE — 88305 TISSUE EXAM BY PATHOLOGIST: CPT | Mod: 26,,, | Performed by: PATHOLOGY

## 2020-06-18 PROCEDURE — 11102 PR TANGENTIAL BIOPSY, SKIN, SINGLE LESION: ICD-10-PCS | Mod: S$GLB,,, | Performed by: DERMATOLOGY

## 2020-06-18 PROCEDURE — 99213 OFFICE O/P EST LOW 20 MIN: CPT | Mod: 25,S$GLB,, | Performed by: DERMATOLOGY

## 2020-06-18 PROCEDURE — 17000 PR DESTRUCTION(LASER SURGERY,CRYOSURGERY,CHEMOSURGERY),PREMALIGNANT LESIONS,FIRST LESION: ICD-10-PCS | Mod: 59,S$GLB,, | Performed by: DERMATOLOGY

## 2020-06-18 PROCEDURE — 17000 DESTRUCT PREMALG LESION: CPT | Mod: 59,S$GLB,, | Performed by: DERMATOLOGY

## 2020-06-18 PROCEDURE — 88342 IMHCHEM/IMCYTCHM 1ST ANTB: CPT | Mod: 26,,, | Performed by: PATHOLOGY

## 2020-06-18 PROCEDURE — 99213 PR OFFICE/OUTPT VISIT, EST, LEVL III, 20-29 MIN: ICD-10-PCS | Mod: 25,S$GLB,, | Performed by: DERMATOLOGY

## 2020-06-18 PROCEDURE — 88305 TISSUE EXAM BY PATHOLOGIST: ICD-10-PCS | Mod: 26,,, | Performed by: PATHOLOGY

## 2020-06-18 PROCEDURE — 88342 IMHCHEM/IMCYTCHM 1ST ANTB: CPT | Performed by: PATHOLOGY

## 2020-06-18 PROCEDURE — 88305 TISSUE EXAM BY PATHOLOGIST: CPT | Performed by: PATHOLOGY

## 2020-06-18 PROCEDURE — 99999 PR PBB SHADOW E&M-EST. PATIENT-LVL III: CPT | Mod: PBBFAC,,, | Performed by: DERMATOLOGY

## 2020-06-18 RX ORDER — NAPROXEN SODIUM 220 MG/1
81 TABLET, FILM COATED ORAL DAILY
Status: ON HOLD | COMMUNITY
End: 2020-08-14 | Stop reason: CLARIF

## 2020-06-18 RX ORDER — FLUTICASONE PROPIONATE 50 MCG
2 SPRAY, SUSPENSION (ML) NASAL DAILY
COMMUNITY
Start: 2020-05-13

## 2020-06-18 NOTE — PATIENT INSTRUCTIONS
CRYOSURGERY      Your doctor has used a method called cryosurgery to treat your skin condition. Cryosurgery refers to the use of very cold substances to treat a variety of skin conditions such as warts, pre-skin cancers, molluscum contagiosum, sun spots, and several benign growths. The substance we use in cryosurgery is liquid nitrogen and is so cold (-195 degrees Celsius) that is burns when administered.     Following treatment in the office, the skin may immediately burn and become red. You may find the area around the lesion is affected as well. It is sometimes necessary to treat not only the lesion, but a small area of the surrounding normal skin to achieve a good response.     A blister, and even a blood filled blister, may form after treatment.   This is a normal response. If the blister is painful, it is acceptable to sterilize a needle and with rubbing alcohol and gently pop the blister. It is important that you gently wash the area with soap and warm water as the blister fluid may contain wart virus if a wart was treated. Do no remove the roof of the blister.     The area treated can take anywhere from 1-3 weeks to heal. Healing time depends on the kind of skin lesion treated, the location, and how aggressively the lesion was treated. It is recommended that the areas treated are covered with Vaseline or bacitracin ointment and a band-aid. If a band-aid is not practical, just ointment applied several times per day will do. Keeping these areas moist will speed the healing time.    Treatment with liquid nitrogen can leave a scar. In dark skin, it may be a light or dark scar, in light skin it may be a white or pink scar. These will generally fade with time.    If you have any concerns after your treatment, please feel free to call the office.      Shave Biopsy Wound Care    Your doctor has performed a shave biopsy today.  A band aid and vaseline ointment has been placed over the site.  This should remain in  place for 24 hours.  It is recommended that you keep the area dry for the first 24 hours.  After 24 hours, you may remove the band aid and wash the area with warm soap and water and apply Vaseline jelly.  Many patients prefer to use Neosporin or Bacitracin ointment.  This is acceptable; however, know that you can develop an allergy to this medication even if you have used it safely for years.  It is important to keep the area moist.  Letting it dry out and get air slows healing time, and will worsen the scar.  Band aid is optional after first 24 hours.      If you notice increasing redness, tenderness, pain, or yellow drainage at the biopsy site, please notify your doctor.  These are signs of an infection.    If your biopsy site is bleeding, apply firm pressure for 15 minutes straight.  Repeat for another 15 minutes, if it is still bleeding.   If the surgical site continues to bleed, then please contact your doctor.      [facility]   DERMATOLOGY  [address]   [place] [address]   Dept: [phone]   Dept Fax: [phone]             [facility] - DERMATOLOGY  [address]  [place] [address]  Dept: [phone]  Dept Fax: [phone]

## 2020-06-18 NOTE — PROGRESS NOTES
Subjective:       Patient ID:  Kike Smith is a 64 y.o. male who presents for   Chief Complaint   Patient presents with    Spot     spots on arms, scrotum, and legs x several years , changing     Hx of lentigines, mult nevi and SK's, last seen on 4/5/18.  He c/o lesion of the right forearm for several years.  + growth. No tx tried.    The patient denies personal or family history of skin cancer.        Review of Systems   Constitutional: Negative for fever and chills.   Gastrointestinal: Negative for nausea and vomiting.   Skin: Positive for activity-related sunscreen use. Negative for daily sunscreen use and recent sunburn.   Hematologic/Lymphatic: Does not bruise/bleed easily.        Objective:    Physical Exam   Constitutional: He appears well-developed and well-nourished. No distress.   Genitourinary:         Neurological: He is alert and oriented to person, place, and time. He is not disoriented.   Psychiatric: He has a normal mood and affect.   Skin:   Areas Examined (abnormalities noted in diagram):   Scalp / Hair Palpated and Inspected  Head / Face Inspection Performed  Neck Inspection Performed  Chest / Axilla Inspection Performed  Abdomen Inspection Performed  Back Inspection Performed  RUE Inspected  LUE Inspection Performed  RLE Inspected  LLE Inspection Performed  Nails and Digits Inspection Performed                   Diagram Legend     Erythematous scaling macule/papule c/w actinic keratosis       Vascular papule c/w angioma      Pigmented verrucoid papule/plaque c/w seborrheic keratosis      Yellow umbilicated papule c/w sebaceous hyperplasia      Irregularly shaped tan macule c/w lentigo     1-2 mm smooth white papules consistent with Milia      Movable subcutaneous cyst with punctum c/w epidermal inclusion cyst      Subcutaneous movable cyst c/w pilar cyst      Firm pink to brown papule c/w dermatofibroma      Pedunculated fleshy papule(s) c/w skin tag(s)      Evenly pigmented macule c/w  junctional nevus     Mildly variegated pigmented, slightly irregular-bordered macule c/w mildly atypical nevus      Flesh colored to evenly pigmented papule c/w intradermal nevus       Pink pearly papule/plaque c/w basal cell carcinoma      Erythematous hyperkeratotic cursted plaque c/w SCC      Surgical scar with no sign of skin cancer recurrence      Open and closed comedones      Inflammatory papules and pustules      Verrucoid papule consistent consistent with wart     Erythematous eczematous patches and plaques     Dystrophic onycholytic nail with subungual debris c/w onychomycosis     Umbilicated papule    Erythematous-base heme-crusted tan verrucoid plaque consistent with inflamed seborrheic keratosis     Erythematous Silvery Scaling Plaque c/w Psoriasis     See annotation                Assessment / Plan:      Pathology Orders:     Normal Orders This Visit    Specimen to Pathology, Dermatology     Questions:    Procedure Type: Dermatology and skin neoplasms    Number of Specimens: 1    ------------------------: -------------------------    Spec 1 Procedure: Biopsy    Spec 1 Clinical Impression: r/o SCC vs. ISK    Spec 1 Source: right forearm    Clinical Information: see above        Seborrheic keratosis  Reassurance given. Discussed diagnosis and that lesions are benign.  AAD handout given.     Actinic keratoses  Cryosurgery Procedure Note    The patient is informed of the precancerous quality and need for treatment of these lesions. After risks, benefits and alternatives explained, including blistering, pain, hyper- and hypopigmentation, patient verbally consents to cryotherapy to precancerous lesions. Liquid nitrogen cryosurgery is applied to the 1 actinic keratoses, as detailed in the physical exam, to produce a freeze injury. The patient is aware that blisters may form and is instructed on wound care with gentle cleansing and use of vaseline ointment to keep moist until healed. The patient is supplied a  handout on cryosurgery and is instructed to call if lesions do not completely resolve.    Neoplasm of uncertain behavior of skin  -     Specimen to Pathology, Dermatology  -     Shave biopsy(-ies) done of 1 site(s).   Patient informed to call for results within 2 weeks if have not received notification [facility] telephone call [facility]         Follow up for call for results.     PROCEDURE NOTE - SHAVE BIOPSY   Location: see above    After risk, benefits, and alternatives were discussed with the patient, the patient agrees to the procedure by verbal informed consent.  The area(s) were cleansed with alcohol. 2 cc of lidocaine 1% with epinephrine was injected for local anesthesia into each lesion(s).  A sharp dermablade was used to remove part or all of the lesion(s).  The specimen(s) will be sent for tissue pathology.  Hemostasis was obtained with aluminum chloride and/or hyfrecation.  The area(s) were dressed with vaseline ointment and bandaged.  The patient tolerated the procedure well without adverse events.  Wound care instructions were given to the patient on the AVS.  The patient will be notified of pathology results once available. Results will also be available in Epic.

## 2020-06-22 ENCOUNTER — TELEPHONE (OUTPATIENT)
Dept: RADIOLOGY | Facility: HOSPITAL | Age: 65
End: 2020-06-22

## 2020-06-23 ENCOUNTER — TELEPHONE (OUTPATIENT)
Dept: UROLOGY | Facility: CLINIC | Age: 65
End: 2020-06-23

## 2020-06-23 ENCOUNTER — HOSPITAL ENCOUNTER (OUTPATIENT)
Dept: RADIOLOGY | Facility: HOSPITAL | Age: 65
Discharge: HOME | End: 2020-06-23
Attending: UROLOGY
Payer: COMMERCIAL

## 2020-06-23 DIAGNOSIS — N40.0 BENIGN PROSTATIC HYPERPLASIA, UNSPECIFIED WHETHER LOWER URINARY TRACT SYMPTOMS PRESENT: Primary | ICD-10-CM

## 2020-06-23 DIAGNOSIS — R31.9 HEMATURIA, UNSPECIFIED TYPE: ICD-10-CM

## 2020-06-23 DIAGNOSIS — N40.0 BENIGN PROSTATIC HYPERPLASIA, UNSPECIFIED WHETHER LOWER URINARY TRACT SYMPTOMS PRESENT: ICD-10-CM

## 2020-06-23 PROCEDURE — 76770 US EXAM ABDO BACK WALL COMP: CPT | Mod: TC

## 2020-06-23 PROCEDURE — 76770 US RETROPERITONEAL COMPLETE: ICD-10-PCS | Mod: 26,,, | Performed by: RADIOLOGY

## 2020-06-23 PROCEDURE — 76770 US EXAM ABDO BACK WALL COMP: CPT | Mod: 26,,, | Performed by: RADIOLOGY

## 2020-06-24 LAB
FINAL PATHOLOGIC DIAGNOSIS: NORMAL
GROSS: NORMAL

## 2020-07-09 ENCOUNTER — OFFICE VISIT (OUTPATIENT)
Dept: UROLOGY | Facility: CLINIC | Age: 65
End: 2020-07-09
Payer: COMMERCIAL

## 2020-07-09 VITALS
HEIGHT: 70 IN | BODY MASS INDEX: 29.26 KG/M2 | HEART RATE: 85 BPM | WEIGHT: 204.38 LBS | SYSTOLIC BLOOD PRESSURE: 136 MMHG | DIASTOLIC BLOOD PRESSURE: 84 MMHG

## 2020-07-09 DIAGNOSIS — N40.0 BENIGN PROSTATIC HYPERPLASIA, UNSPECIFIED WHETHER LOWER URINARY TRACT SYMPTOMS PRESENT: ICD-10-CM

## 2020-07-09 DIAGNOSIS — R31.9 HEMATURIA, UNSPECIFIED TYPE: Primary | ICD-10-CM

## 2020-07-09 DIAGNOSIS — N28.1 RENAL CYST: ICD-10-CM

## 2020-07-09 DIAGNOSIS — I10 HYPERTENSION, UNSPECIFIED TYPE: ICD-10-CM

## 2020-07-09 LAB
BILIRUB SERPL-MCNC: NORMAL MG/DL
BLOOD URINE, POC: NORMAL
CLARITY, POC UA: CLEAR
COLOR, POC UA: YELLOW
GLUCOSE UR QL STRIP: NORMAL
KETONES UR QL STRIP: NORMAL
LEUKOCYTE ESTERASE URINE, POC: NORMAL
NITRITE, POC UA: NORMAL
PH, POC UA: 7
PROTEIN, POC: NORMAL
SPECIFIC GRAVITY, POC UA: NORMAL
UROBILINOGEN, POC UA: NORMAL

## 2020-07-09 PROCEDURE — 99214 PR OFFICE/OUTPT VISIT, EST, LEVL IV, 30-39 MIN: ICD-10-PCS | Mod: 25,S$GLB,, | Performed by: UROLOGY

## 2020-07-09 PROCEDURE — 99999 PR PBB SHADOW E&M-EST. PATIENT-LVL III: ICD-10-PCS | Mod: PBBFAC,,, | Performed by: UROLOGY

## 2020-07-09 PROCEDURE — 3075F PR MOST RECENT SYSTOLIC BLOOD PRESS GE 130-139MM HG: ICD-10-PCS | Mod: CPTII,S$GLB,, | Performed by: UROLOGY

## 2020-07-09 PROCEDURE — 3079F DIAST BP 80-89 MM HG: CPT | Mod: CPTII,S$GLB,, | Performed by: UROLOGY

## 2020-07-09 PROCEDURE — 3008F BODY MASS INDEX DOCD: CPT | Mod: CPTII,S$GLB,, | Performed by: UROLOGY

## 2020-07-09 PROCEDURE — 81002 URINALYSIS NONAUTO W/O SCOPE: CPT | Mod: S$GLB,,, | Performed by: UROLOGY

## 2020-07-09 PROCEDURE — 3008F PR BODY MASS INDEX (BMI) DOCUMENTED: ICD-10-PCS | Mod: CPTII,S$GLB,, | Performed by: UROLOGY

## 2020-07-09 PROCEDURE — 99999 PR PBB SHADOW E&M-EST. PATIENT-LVL III: CPT | Mod: PBBFAC,,, | Performed by: UROLOGY

## 2020-07-09 PROCEDURE — 3075F SYST BP GE 130 - 139MM HG: CPT | Mod: CPTII,S$GLB,, | Performed by: UROLOGY

## 2020-07-09 PROCEDURE — 81002 POCT URINE DIPSTICK WITHOUT MICROSCOPE: ICD-10-PCS | Mod: S$GLB,,, | Performed by: UROLOGY

## 2020-07-09 PROCEDURE — 3079F PR MOST RECENT DIASTOLIC BLOOD PRESSURE 80-89 MM HG: ICD-10-PCS | Mod: CPTII,S$GLB,, | Performed by: UROLOGY

## 2020-07-09 PROCEDURE — 99214 OFFICE O/P EST MOD 30 MIN: CPT | Mod: 25,S$GLB,, | Performed by: UROLOGY

## 2020-07-09 RX ORDER — TAMSULOSIN HYDROCHLORIDE 0.4 MG/1
CAPSULE ORAL
Qty: 30 CAPSULE | Refills: 11 | Status: SHIPPED | OUTPATIENT
Start: 2020-07-09 | End: 2021-02-09

## 2020-07-09 NOTE — PROGRESS NOTES
Chief Complaint: Hematuria    HPI:   7/9/20: Voiding fine.  Slight microhematuria again today.  Reviewed history in detail.  Indep assessment of imaging agree with report:  Min complex renal cysts bear observation.  CaP screens with PCP.  4/9/19: Cysto today shows no sig finding other than mild BPH, tiny prostatic mucosa calcifications.  CT normal except some benign left renal cysts.  3/27/19: Returns for microhematuria.  Longstanding problem.  No workup for same in last ten years.  No obvious known causes.  Brother dx with bladder cancer.  Dr. Reyna checked prostate yesterday.  12/4/17: 61 yo man had a pain arise in the right testicle like an ache this summer.  Was treated on presumption of orchitis and pretty much went away.  Repeated last month same story.  Scrotal US 7/17 shows some very small inconsequential epididymal cysts. Strained his back lately and that was very painful and couldn't feel any testicle problems during that episode.  Has a mild right testicular discomfort when he thinks about it. Might go days without thinking about it. No abd/pelvic pain and no exac/rel factors.  No hematuria.  No urolithiasis.  No urinary bother.  Past history of idiopathic microhematuria, none today.  Normal sexual function.  Brother had bladder cancer treated age 62; has a neobladder.  Referred by Dr. Castro.    Allergies:  Patient has no known allergies.    Medications:  has a current medication list which includes the following prescription(s): aspirin, azelastine, epinephrine, flu vac 2018 [id number](pf), fluticasone propionate, hydrochlorothiazide, lactobacillus acidophilus, pravastatin, and tamsulosin.    Review of Systems:  General: No fever, chills, fatigability, or weight loss.  Skin: No rashes, itching, or changes in color or texture of skin.  Chest: Denies RAINES, cyanosis, wheezing, cough, and sputum production.  Abdomen: Appetite fine. No weight loss. Denies diarrhea, abdominal pain, hematemesis, or blood in  stool.  Musculoskeletal: No joint stiffness or swelling. Denies back pain.  : As above.  All other review of systems negative.    PMH:   has a past medical history of Allergy, Benign hematuria, History of colon polyps (1/25/2018), Hyperlipidemia, and Hypertension.    PSH:   has a past surgical history that includes Colonoscopy (N/A, 1/25/2018); Knee arthroscopy; and Leg Surgery (Left, 08/10/2019).    FamHx: family history includes Bladder Cancer in his brother; Cataracts in his mother; Coronary artery disease in his father; Fanconi anemia in his mother; Heart attack (age of onset: 72) in his father; Heart disease in his father; Leukemia in his father; Macular degeneration in his maternal uncle; Multiple myeloma in his sister.    SocHx:  reports that he has never smoked. He has never used smokeless tobacco. He reports that he does not drink alcohol or use drugs.      Physical Exam:  Vitals:    07/09/20 1025   BP: 136/84   Pulse: 85     General: A&Ox3, no apparent distress, no deformities  Neck: No masses, normal thyroid  Lungs: normal inspiration, no use of accessory muscles  Heart: normal pulse, no arrhythmias  Abdomen: Soft, NT, ND  Skin: The skin is warm and dry. No jaundice.  Ext: No c/c/e.  :   3/27/19: Test desc dina, no abnormalities of epididymus.  Right testicle somewhat tender but not much. Penis normal, with normal penile and scrotal skin. Meatus normal. KAREN was normal with PCP this year.    Labs/Studies:   PSA    9/17: 3.7    3/19: 3.9    Impression/Plan:   1. Hematuria: renal cysts no other worrisome complaints; US 1 year  2. HTN: well controlled on current meds  3. BPH: flomax helped.

## 2020-07-14 ENCOUNTER — TELEPHONE (OUTPATIENT)
Dept: INTERNAL MEDICINE | Facility: CLINIC | Age: 65
End: 2020-07-14

## 2020-07-14 DIAGNOSIS — Z00.00 ROUTINE HEALTH MAINTENANCE: Primary | ICD-10-CM

## 2020-07-14 NOTE — TELEPHONE ENCOUNTER
----- Message from Lynn Ly sent at 7/14/2020 10:35 AM CDT -----  Regarding: Call  Contact: pt  Stated he will like to speak to a nurse about his upcome appointment needing orders for lab work to be done before seeing the doctor, he can be reached at [phone] Thanks

## 2020-07-14 NOTE — TELEPHONE ENCOUNTER
S/W pt, he has an appt on 07/28/20 and would like to have labs drawn before that. I informed the pt I would send Dr. Reyna a message, and once the lab are ordered, I will call him back to schedule them. Pt verbalized understanding/wesley

## 2020-07-15 NOTE — TELEPHONE ENCOUNTER
Ok ordered. Please let him know new recommendation is everyone have at least one hiv screening lab done. I added

## 2020-07-21 ENCOUNTER — LAB VISIT (OUTPATIENT)
Dept: LAB | Facility: HOSPITAL | Age: 65
End: 2020-07-21
Attending: FAMILY MEDICINE
Payer: COMMERCIAL

## 2020-07-21 DIAGNOSIS — Z00.00 ROUTINE HEALTH MAINTENANCE: ICD-10-CM

## 2020-07-21 LAB
ALBUMIN SERPL BCP-MCNC: 3.9 G/DL (ref 3.5–5.2)
ALP SERPL-CCNC: 57 U/L (ref 55–135)
ALT SERPL W/O P-5'-P-CCNC: 21 U/L (ref 10–44)
ANION GAP SERPL CALC-SCNC: 5 MMOL/L (ref 8–16)
AST SERPL-CCNC: 18 U/L (ref 10–40)
BILIRUB SERPL-MCNC: 0.8 MG/DL (ref 0.1–1)
BUN SERPL-MCNC: 21 MG/DL (ref 8–23)
CALCIUM SERPL-MCNC: 9.8 MG/DL (ref 8.7–10.5)
CHLORIDE SERPL-SCNC: 104 MMOL/L (ref 95–110)
CHOLEST SERPL-MCNC: 162 MG/DL (ref 120–199)
CHOLEST/HDLC SERPL: 4 {RATIO} (ref 2–5)
CO2 SERPL-SCNC: 33 MMOL/L (ref 23–29)
CREAT SERPL-MCNC: 0.9 MG/DL (ref 0.5–1.4)
EST. GFR  (AFRICAN AMERICAN): >60 ML/MIN/1.73 M^2
EST. GFR  (NON AFRICAN AMERICAN): >60 ML/MIN/1.73 M^2
GLUCOSE SERPL-MCNC: 90 MG/DL (ref 70–110)
HDLC SERPL-MCNC: 41 MG/DL (ref 40–75)
HDLC SERPL: 25.3 % (ref 20–50)
LDLC SERPL CALC-MCNC: 109.2 MG/DL (ref 63–159)
NONHDLC SERPL-MCNC: 121 MG/DL
POTASSIUM SERPL-SCNC: 4.4 MMOL/L (ref 3.5–5.1)
PROT SERPL-MCNC: 7.5 G/DL (ref 6–8.4)
SODIUM SERPL-SCNC: 142 MMOL/L (ref 136–145)
TRIGL SERPL-MCNC: 59 MG/DL (ref 30–150)

## 2020-07-21 PROCEDURE — 36415 COLL VENOUS BLD VENIPUNCTURE: CPT

## 2020-07-21 PROCEDURE — 86703 HIV-1/HIV-2 1 RESULT ANTBDY: CPT

## 2020-07-21 PROCEDURE — 80061 LIPID PANEL: CPT

## 2020-07-21 PROCEDURE — 84153 ASSAY OF PSA TOTAL: CPT

## 2020-07-21 PROCEDURE — 80053 COMPREHEN METABOLIC PANEL: CPT

## 2020-07-22 LAB
COMPLEXED PSA SERPL-MCNC: 4.1 NG/ML (ref 0–4)
HIV 1+2 AB+HIV1 P24 AG SERPL QL IA: NEGATIVE

## 2020-07-28 ENCOUNTER — OFFICE VISIT (OUTPATIENT)
Dept: INTERNAL MEDICINE | Facility: CLINIC | Age: 65
End: 2020-07-28
Payer: COMMERCIAL

## 2020-07-28 VITALS
BODY MASS INDEX: 30.36 KG/M2 | HEIGHT: 70 IN | RESPIRATION RATE: 16 BRPM | DIASTOLIC BLOOD PRESSURE: 78 MMHG | TEMPERATURE: 98 F | WEIGHT: 212.06 LBS | OXYGEN SATURATION: 97 % | HEART RATE: 88 BPM | SYSTOLIC BLOOD PRESSURE: 122 MMHG

## 2020-07-28 DIAGNOSIS — K40.90 LEFT INGUINAL HERNIA: ICD-10-CM

## 2020-07-28 DIAGNOSIS — Z12.5 SCREENING FOR PROSTATE CANCER: ICD-10-CM

## 2020-07-28 DIAGNOSIS — Z00.00 ROUTINE HEALTH MAINTENANCE: Primary | ICD-10-CM

## 2020-07-28 DIAGNOSIS — I10 ESSENTIAL HYPERTENSION: ICD-10-CM

## 2020-07-28 DIAGNOSIS — R97.20 ELEVATED PSA: ICD-10-CM

## 2020-07-28 PROCEDURE — 99999 PR PBB SHADOW E&M-EST. PATIENT-LVL IV: ICD-10-PCS | Mod: PBBFAC,,, | Performed by: FAMILY MEDICINE

## 2020-07-28 PROCEDURE — 3078F DIAST BP <80 MM HG: CPT | Mod: CPTII,S$GLB,, | Performed by: FAMILY MEDICINE

## 2020-07-28 PROCEDURE — 99396 PREV VISIT EST AGE 40-64: CPT | Mod: S$GLB,,, | Performed by: FAMILY MEDICINE

## 2020-07-28 PROCEDURE — 3008F BODY MASS INDEX DOCD: CPT | Mod: CPTII,S$GLB,, | Performed by: FAMILY MEDICINE

## 2020-07-28 PROCEDURE — 99396 PR PREVENTIVE VISIT,EST,40-64: ICD-10-PCS | Mod: S$GLB,,, | Performed by: FAMILY MEDICINE

## 2020-07-28 PROCEDURE — 3008F PR BODY MASS INDEX (BMI) DOCUMENTED: ICD-10-PCS | Mod: CPTII,S$GLB,, | Performed by: FAMILY MEDICINE

## 2020-07-28 PROCEDURE — 3078F PR MOST RECENT DIASTOLIC BLOOD PRESSURE < 80 MM HG: ICD-10-PCS | Mod: CPTII,S$GLB,, | Performed by: FAMILY MEDICINE

## 2020-07-28 PROCEDURE — 3074F PR MOST RECENT SYSTOLIC BLOOD PRESSURE < 130 MM HG: ICD-10-PCS | Mod: CPTII,S$GLB,, | Performed by: FAMILY MEDICINE

## 2020-07-28 PROCEDURE — 99999 PR PBB SHADOW E&M-EST. PATIENT-LVL IV: CPT | Mod: PBBFAC,,, | Performed by: FAMILY MEDICINE

## 2020-07-28 PROCEDURE — 3074F SYST BP LT 130 MM HG: CPT | Mod: CPTII,S$GLB,, | Performed by: FAMILY MEDICINE

## 2020-07-28 RX ORDER — CHOLECALCIFEROL (VITAMIN D3) 25 MCG
1000 TABLET ORAL DAILY
COMMUNITY
End: 2022-04-07

## 2020-07-28 NOTE — PROGRESS NOTES
Subjective:       Patient ID: Kike Smith is a  male.    Chief Complaint: Annual Exam    HPIhere for phys exam no c/o;  htn controlled and nl bps;baudilio statin; baudilio statin;runs 30 mins 4 x week treadmill. 50 min daily stationary bike    fam hx bladder ca/hematuria nl w./u(brother bladder ca /smoker)renal cyst : nl hmaturia w/u yrs ago. Would like to monitor for inc hematuria. No gross hematuria;recent ua inc microscopic hematuria    Stable/bit inc psa ; hx intermitt borderline elev in past. No bph sympt    Chronic sinus issues sees ent dr delano astelin/flonase    Past Medical History:   Diagnosis Date    Allergy     Benign hematuria     s/p urol/cysto    History of colon polyps 1/25/2018    He has had colon polyps since he was in his 30's.  He has had multiple colonoscopies and has had adenomatous polyps.    Hyperlipidemia     Hypertension     Squamous cell carcinoma skin of arm      r arm;s/p mohs     Past Surgical History:   Procedure Laterality Date    CLOSURE OF DEFECT OF MOHS PROCEDURE  07/10/2020    dr reynoso    COLONOSCOPY N/A 1/25/2018    Procedure: COLONOSCOPY;  Surgeon: Juan A Walter MD;  Location: [facility] END[facility];  Service: Endoscopy;  Laterality: N/A;    KNEE ARTHROSCOPY      LEG SURGERY Left 08/10/2019    tib/fib fx     Family History   Problem Relation Age of Onset    Macular degeneration Maternal Uncle     Bladder Cancer Brother         smoker    Cataracts Mother     Fanconi anemia Mother     Leukemia Father     Heart disease Father     Coronary artery disease Father     Heart attack Father 72    Multiple myeloma Sister      Social History     Socioeconomic History    Marital status:      Spouse name: Not on file    Number of children: Not on file    Years of education: Not on file    Highest education level: Not on file   Occupational History    Not on file   Social Needs    Financial resource strain: Not hard at all    Food insecurity     Worry: Never true      Inability: Never true    Transportation needs     Medical: No     Non-medical: No   Tobacco Use    Smoking status: Never Smoker    Smokeless tobacco: Never Used   Substance and Sexual Activity    Alcohol use: No     Frequency: Never     Binge frequency: Never    Drug use: Never    Sexual activity: Yes     Partners: Female   Lifestyle    Physical activity     Days per week: 4 days     Minutes per session: 30 min    Stress: Only a little   Relationships    Social connections     Talks on phone: More than three times a week     Gets together: More than three times a week     Attends [employer] service: Not on file     Active member of club or organization: No     Attends meetings of clubs or organizations: Never     Relationship status:    Other Topics Concern    Not on file   Social History Narrative     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 16         Review of Systems  Cardiovascular: no chest pain  Chest: no shortness of breath  Abd: no abd pain  Remainder review of systems negative    Objective:      Physical Exam   Constitutional: He is oriented to person, place, and time. He appears well-developed and well-nourished.   HENT:   Head: Normocephalic and atraumatic.   Right Ear: External ear normal.   Left Ear: External ear normal.   Nose: Nose normal.   Mouth/Throat: Oropharynx is clear and moist.   Nl tms   Eyes: Conjunctivae and EOM are normal. Pupils are equal, round, and reactive to light. No scleral icterus.   Neck: Normal range of motion. Neck supple. Carotid bruit is not present.   Cardiovascular: Normal rate, regular rhythm and normal heart sounds.  Exam reveals no gallop and no friction rub.    No murmur heard.  Pulmonary/Chest: Effort normal and breath sounds normal. He has no wheezes.   Abdominal: Soft. Bowel sounds are normal. He exhibits no distension and no mass. There is no  hepatosplenomegaly. There is no tenderness. There is no rebound and no guarding.   Musculoskeletal: Normal range of motion. He exhibits no tenderness.   Lymphadenopathy:     He has no cervical adenopathy.   Neurological: He is alert and oriented to person, place, and time. No cranial nerve deficit. Coordination normal.   Skin: Skin is warm and dry. No rash noted. No erythema.   Psychiatric: He has a normal mood and affect. His behavior is normal. Judgment and thought content normal.   Nursing note and vitals reviewed.  gu no hernia r side. Bulging left ing area  haider mod prost no nodules    Assessment:       1. Routine health maintenance    Borderline psa  fam hx bladder ca/hematuria inc  l ing hrnia  Plan:       urol for inc hematuria/famhx bladder ca/borderline psa/renal cyst when due  Lab 12 months and follow up after    Hernia precaut      Shingrix new shingles vaccine  via a pharmacy    Routine health maintenance    Essential hypertension  -     Comprehensive metabolic panel; Future; Expected date: 07/28/2021  -     Lipid Panel; Future; Expected date: 07/28/2021    Elevated PSA    Screening for prostate cancer  -     PSA, Screening; Future; Expected date: 07/28/2021    Left inguinal hernia  -     Ambulatory referral/consult to General Surgery; Future; Expected date: 08/04/2020            ADD: I spoke with dr stoner 7/2020  and says unless big inc psa no need to recheck sooner than planned ; will d/w at f/u

## 2020-08-05 ENCOUNTER — OFFICE VISIT (OUTPATIENT)
Dept: SURGERY | Facility: CLINIC | Age: 65
End: 2020-08-05
Payer: COMMERCIAL

## 2020-08-05 VITALS
WEIGHT: 209.19 LBS | SYSTOLIC BLOOD PRESSURE: 130 MMHG | DIASTOLIC BLOOD PRESSURE: 84 MMHG | BODY MASS INDEX: 29.95 KG/M2 | TEMPERATURE: 97 F | HEIGHT: 70 IN | HEART RATE: 66 BPM

## 2020-08-05 DIAGNOSIS — K40.90 LEFT INGUINAL HERNIA: Primary | ICD-10-CM

## 2020-08-05 DIAGNOSIS — Z01.818 PREOP EXAMINATION: ICD-10-CM

## 2020-08-05 PROCEDURE — 3075F SYST BP GE 130 - 139MM HG: CPT | Mod: CPTII,S$GLB,, | Performed by: SURGERY

## 2020-08-05 PROCEDURE — 3079F DIAST BP 80-89 MM HG: CPT | Mod: CPTII,S$GLB,, | Performed by: SURGERY

## 2020-08-05 PROCEDURE — 99203 OFFICE O/P NEW LOW 30 MIN: CPT | Mod: S$GLB,,, | Performed by: SURGERY

## 2020-08-05 PROCEDURE — 3008F PR BODY MASS INDEX (BMI) DOCUMENTED: ICD-10-PCS | Mod: CPTII,S$GLB,, | Performed by: SURGERY

## 2020-08-05 PROCEDURE — 3079F PR MOST RECENT DIASTOLIC BLOOD PRESSURE 80-89 MM HG: ICD-10-PCS | Mod: CPTII,S$GLB,, | Performed by: SURGERY

## 2020-08-05 PROCEDURE — 99203 PR OFFICE/OUTPT VISIT, NEW, LEVL III, 30-44 MIN: ICD-10-PCS | Mod: S$GLB,,, | Performed by: SURGERY

## 2020-08-05 PROCEDURE — 3008F BODY MASS INDEX DOCD: CPT | Mod: CPTII,S$GLB,, | Performed by: SURGERY

## 2020-08-05 PROCEDURE — 3075F PR MOST RECENT SYSTOLIC BLOOD PRESS GE 130-139MM HG: ICD-10-PCS | Mod: CPTII,S$GLB,, | Performed by: SURGERY

## 2020-08-05 PROCEDURE — 99999 PR PBB SHADOW E&M-EST. PATIENT-LVL V: CPT | Mod: PBBFAC,,, | Performed by: SURGERY

## 2020-08-05 PROCEDURE — 99999 PR PBB SHADOW E&M-EST. PATIENT-LVL V: ICD-10-PCS | Mod: PBBFAC,,, | Performed by: SURGERY

## 2020-08-05 RX ORDER — SODIUM CHLORIDE 9 MG/ML
INJECTION, SOLUTION INTRAVENOUS CONTINUOUS
Status: CANCELLED | OUTPATIENT
Start: 2020-08-05

## 2020-08-05 RX ORDER — LIDOCAINE HYDROCHLORIDE 10 MG/ML
1 INJECTION, SOLUTION EPIDURAL; INFILTRATION; INTRACAUDAL; PERINEURAL ONCE
Status: CANCELLED | OUTPATIENT
Start: 2020-08-05 | End: 2020-08-05

## 2020-08-05 NOTE — H&P (VIEW-ONLY)
History & Physical    SUBJECTIVE:     History of Present Illness:  Patient is a 64 y.o. male referred for left inguinal hernia.  Patient reports straining and noticing pain.  Since this time pain is resolved but he has noticed an asymmetry with his left groin versus his right groin.  He was seen by Dr. Reyna for his annual physical exam and noted to have a left inguinal hernia.    Chief Complaint   Patient presents with    Inguinal Hernia     left       Review of patient's allergies indicates:  No Known Allergies    Current Outpatient Medications   Medication Sig Dispense Refill    aspirin 81 MG Chew Take 81 mg by mouth once daily.      azelastine (ASTELIN) 137 mcg (0.1 %) nasal spray azelastine 137 mcg (0.1 %) nasal spray aerosol   INSTILL 2 SPRAYS INTO EACH NOSTRIL TWICE A DAY      EPINEPHrine (EPIPEN) 0.3 mg/0.3 mL AtIn Auvi-Q 0.3 mg/0.3 mL injection, auto-injector   Take 1 auto as needed by injection route as directed.      flu vac 2018 [id number],PF, 45 mcg (15 mcg x 3)/0.5 mL Syrg Fluvirin 0650-3891 45 mcg (15 mcg x 3)/0.5 mL intramuscular suspension      fluticasone propionate (FLONASE) 50 mcg/actuation nasal spray 2 sprays by Each Nostril route once daily.      hydroCHLOROthiazide (HYDRODIURIL) 25 MG tablet TAKE 1 TABLET BY MOUTH ONCE DAILY 90 tablet 3    Lactobacillus acidophilus (PROBIOTIC) 10 billion cell Cap Take 1 tablet by mouth.      pravastatin (PRAVACHOL) 40 MG tablet TAKE 1 TABLET BY MOUTH ONCE DAILY 90 tablet 1    tamsulosin (FLOMAX) 0.4 mg Cap TAKE 1 CAPSULE(0.4 MG) BY MOUTH EVERY DAY 30 capsule 11    vitamin D (VITAMIN D3) 1000 units Tab Take 1,000 Units by mouth once daily.       No current facility-administered medications for this visit.        Past Medical History:   Diagnosis Date    Allergy     Benign hematuria     s/p urol/cysto    History of colon polyps 1/25/2018    He has had colon polyps since he was in his 30's.  He has had multiple colonoscopies and has had  adenomatous polyps.    Hyperlipidemia     Hypertension     Squamous cell carcinoma skin of arm      r arm;s/p mohs     Past Surgical History:   Procedure Laterality Date    CLOSURE OF DEFECT OF MOHS PROCEDURE  07/10/2020    dr reynoso    COLONOSCOPY N/A 1/25/2018    Procedure: COLONOSCOPY;  Surgeon: Juan A Walter MD;  [facility];  Service: Endoscopy;  Laterality: N/A;    KNEE ARTHROSCOPY      LEG SURGERY Left 08/10/2019    tib/fib fx     Family History   Problem Relation Age of Onset    Macular degeneration Maternal Uncle     Bladder Cancer Brother         smoker    Cataracts Mother     Fanconi anemia Mother     Leukemia Father     Heart disease Father     Coronary artery disease Father     Heart attack Father 72    Multiple myeloma Sister      Social History     Tobacco Use    Smoking status: Never Smoker    Smokeless tobacco: Never Used   Substance Use Topics    Alcohol use: No     Frequency: Never     Binge frequency: Never    Drug use: Never        Review of Systems:  Review of Systems   Constitutional: Negative for activity change, chills, fever and unexpected weight change.   HENT: Negative for congestion, hearing loss, rhinorrhea and trouble swallowing.    Eyes: Negative for discharge and visual disturbance.   Respiratory: Negative for chest tightness, shortness of breath and wheezing.    Cardiovascular: Negative for chest pain and palpitations.   Gastrointestinal: Negative for abdominal distention, abdominal pain, blood in stool, constipation, diarrhea, nausea, rectal pain and vomiting.   Endocrine: Negative for polydipsia and polyuria.   Genitourinary: Negative for difficulty urinating, dysuria, hematuria and urgency.   Musculoskeletal: Negative for arthralgias, joint swelling and neck pain.   Skin: Negative for rash.   Neurological: Negative for weakness, light-headedness and headaches.   Hematological: Negative for adenopathy.   Psychiatric/Behavioral: Negative for  "confusion and dysphoric mood.       OBJECTIVE:     Vital Signs (Most Recent)  Temp: 97 °F (36.1 °C) (08/05/20 0815)  Pulse: 66 (08/05/20 0815)  BP: 130/84 (08/05/20 0815)  5' 10" (1.778 m)  94.9 kg (209 lb 3.5 oz)     Physical Exam:  Physical Exam  Vitals signs reviewed.   Constitutional:       Appearance: He is well-developed.   HENT:      Head: Normocephalic and atraumatic.   Neck:      Musculoskeletal: Normal range of motion and neck supple.   Cardiovascular:      Rate and Rhythm: Normal rate and regular rhythm.   Pulmonary:      Effort: Pulmonary effort is normal.      Breath sounds: Normal breath sounds.   Abdominal:      General: Bowel sounds are normal. There is no distension.      Palpations: Abdomen is soft.      Tenderness: There is no abdominal tenderness.      Hernia: A hernia (Left inguinal reducible) is present.   Skin:     General: Skin is warm and dry.   Neurological:      Mental Status: He is alert and oriented to person, place, and time.         ASSESSMENT/PLAN:     64-year-old male with reducible left inguinal hernia    PLAN:Plan     Robotic left inguinal hernia repair with mesh explore right 08/14/2020  Preop:  CBC, CMP, EKG, COVID-19  Discussed risks and benefits of inguinal hernia repair including but not limited to:  Pain, bleeding, and infection, injury to spermatic cord, testicular atrophy or loss, nerve pain which may be long lasting, recurrence, need for further surgery       "

## 2020-08-05 NOTE — PROGRESS NOTES
History & Physical    SUBJECTIVE:     History of Present Illness:  Patient is a 64 y.o. male referred for left inguinal hernia.  Patient reports straining and noticing pain.  Since this time pain is resolved but he has noticed an asymmetry with his left groin versus his right groin.  He was seen by Dr. Reyna for his annual physical exam and noted to have a left inguinal hernia.    Chief Complaint   Patient presents with    Inguinal Hernia     left       Review of patient's allergies indicates:  No Known Allergies    Current Outpatient Medications   Medication Sig Dispense Refill    aspirin 81 MG Chew Take 81 mg by mouth once daily.      azelastine (ASTELIN) 137 mcg (0.1 %) nasal spray azelastine 137 mcg (0.1 %) nasal spray aerosol   INSTILL 2 SPRAYS INTO EACH NOSTRIL TWICE A DAY      EPINEPHrine (EPIPEN) 0.3 mg/0.3 mL AtIn Auvi-Q 0.3 mg/0.3 mL injection, auto-injector   Take 1 auto as needed by injection route as directed.      flu vac 2018 [id number], 45 mcg (15 mcg x 3)/0.5 mL Syrg Fluvirin [id number] 45 mcg (15 mcg x 3)/0.5 mL intramuscular suspension      fluticasone propionate (FLONASE) 50 mcg/actuation nasal spray 2 sprays by Each Nostril route once daily.      hydroCHLOROthiazide (HYDRODIURIL) 25 MG tablet TAKE 1 TABLET BY MOUTH ONCE DAILY 90 tablet 3    Lactobacillus acidophilus (PROBIOTIC) 10 billion cell Cap Take 1 tablet by mouth.      pravastatin (PRAVACHOL) 40 MG tablet TAKE 1 TABLET BY MOUTH ONCE DAILY 90 tablet 1    tamsulosin (FLOMAX) 0.4 mg Cap TAKE 1 CAPSULE(0.4 MG) BY MOUTH EVERY DAY 30 capsule 11    vitamin D (VITAMIN D3) 1000 units Tab Take 1,000 Units by mouth once daily.       No current facility-administered medications for this visit.        Past Medical History:   Diagnosis Date    Allergy     Benign hematuria     s/p urol/cysto    History of colon polyps 1/25/2018    He has had colon polyps since he was in his 30's.  He has had multiple colonoscopies and has had  adenomatous polyps.    Hyperlipidemia     Hypertension     Squamous cell carcinoma skin of arm      r arm;s/p mohs     Past Surgical History:   Procedure Laterality Date    CLOSURE OF DEFECT OF MOHS PROCEDURE  07/10/2020    dr reynoso    COLONOSCOPY N/A 1/25/2018    Procedure: COLONOSCOPY;  Surgeon: Juan A Walter MD;  Location[facility];  Service: Endoscopy;  Laterality: N/A;    KNEE ARTHROSCOPY      LEG SURGERY Left 08/10/2019    tib/fib fx     Family History   Problem Relation Age of Onset    Macular degeneration Maternal Uncle     Bladder Cancer Brother         smoker    Cataracts Mother     Fanconi anemia Mother     Leukemia Father     Heart disease Father     Coronary artery disease Father     Heart attack Father 72    Multiple myeloma Sister      Social History     Tobacco Use    Smoking status: Never Smoker    Smokeless tobacco: Never Used   Substance Use Topics    Alcohol use: No     Frequency: Never     Binge frequency: Never    Drug use: Never        Review of Systems:  Review of Systems   Constitutional: Negative for activity change, chills, fever and unexpected weight change.   HENT: Negative for congestion, hearing loss, rhinorrhea and trouble swallowing.    Eyes: Negative for discharge and visual disturbance.   Respiratory: Negative for chest tightness, shortness of breath and wheezing.    Cardiovascular: Negative for chest pain and palpitations.   Gastrointestinal: Negative for abdominal distention, abdominal pain, blood in stool, constipation, diarrhea, nausea, rectal pain and vomiting.   Endocrine: Negative for polydipsia and polyuria.   Genitourinary: Negative for difficulty urinating, dysuria, hematuria and urgency.   Musculoskeletal: Negative for arthralgias, joint swelling and neck pain.   Skin: Negative for rash.   Neurological: Negative for weakness, light-headedness and headaches.   Hematological: Negative for adenopathy.   Psychiatric/Behavioral: Negative for  "confusion and dysphoric mood.       OBJECTIVE:     Vital Signs (Most Recent)  Temp: 97 °F (36.1 °C) (08/05/20 0815)  Pulse: 66 (08/05/20 0815)  BP: 130/84 (08/05/20 0815)  5' 10" (1.778 m)  94.9 kg (209 lb 3.5 oz)     Physical Exam:  Physical Exam  Vitals signs reviewed.   Constitutional:       Appearance: He is well-developed.   HENT:      Head: Normocephalic and atraumatic.   Neck:      Musculoskeletal: Normal range of motion and neck supple.   Cardiovascular:      Rate and Rhythm: Normal rate and regular rhythm.   Pulmonary:      Effort: Pulmonary effort is normal.      Breath sounds: Normal breath sounds.   Abdominal:      General: Bowel sounds are normal. There is no distension.      Palpations: Abdomen is soft.      Tenderness: There is no abdominal tenderness.      Hernia: A hernia (Left inguinal reducible) is present.   Skin:     General: Skin is warm and dry.   Neurological:      Mental Status: He is alert and oriented to person, place, and time.         ASSESSMENT/PLAN:     64-year-old male with reducible left inguinal hernia    PLAN:Plan     Robotic left inguinal hernia repair with mesh explore right 08/14/2020  Preop:  CBC, CMP, EKG, COVID-19  Discussed risks and benefits of inguinal hernia repair including but not limited to:  Pain, bleeding, and infection, injury to spermatic cord, testicular atrophy or loss, nerve pain which may be long lasting, recurrence, need for further surgery       "

## 2020-08-05 NOTE — LETTER
August 5, 2020      Anmol Reyna MD  [address] [place] [address]           [facility]  [address] [facility] [address]  Phone: [phone]  Fax: [phone]          Patient: Kike Smith   MR Number: [id number]   YOB: 1955   Date of Visit: 8/5/2020       Dear Dr. Anmol Reyna:    Thank you for referring Kike Smith to me for evaluation. Attached you will find relevant portions of my assessment and plan of care.    If you have questions, please do not hesitate to call me. I look forward to following Kike Smith along with you.    Sincerely,    Mark Anthony Araujo MD    Enclosure  CC:  No Recipients    If you would like to receive this communication electronically, please contact [email or web address] or [phone] to request more information on [facility] access.    For providers and/or their staff who would like to refer a patient to [facility], please contact us through our one-stop-shop provider referral line, [facility], at [phone].    If you feel you have received this communication in error or would no longer like to receive these types of communications, please e-mail [email or web address]

## 2020-08-06 ENCOUNTER — OFFICE VISIT (OUTPATIENT)
Dept: DERMATOLOGY | Facility: CLINIC | Age: 65
End: 2020-08-06
Payer: COMMERCIAL

## 2020-08-06 DIAGNOSIS — L57.0 ACTINIC KERATOSES: ICD-10-CM

## 2020-08-06 DIAGNOSIS — Z12.83 SCREENING, MALIGNANT NEOPLASM, SKIN: ICD-10-CM

## 2020-08-06 DIAGNOSIS — L90.5 SCAR CONDITIONS/SKIN FIBROSIS: Primary | ICD-10-CM

## 2020-08-06 DIAGNOSIS — L82.1 SEBORRHEIC KERATOSIS: ICD-10-CM

## 2020-08-06 DIAGNOSIS — Z85.828 HISTORY OF SKIN CANCER: ICD-10-CM

## 2020-08-06 DIAGNOSIS — D18.01 HEMANGIOMA OF SKIN: ICD-10-CM

## 2020-08-06 PROCEDURE — 17000 PR DESTRUCTION(LASER SURGERY,CRYOSURGERY,CHEMOSURGERY),PREMALIGNANT LESIONS,FIRST LESION: ICD-10-PCS | Mod: S$GLB,,, | Performed by: DERMATOLOGY

## 2020-08-06 PROCEDURE — 99999 PR PBB SHADOW E&M-EST. PATIENT-LVL III: CPT | Mod: PBBFAC,,, | Performed by: DERMATOLOGY

## 2020-08-06 PROCEDURE — 99213 OFFICE O/P EST LOW 20 MIN: CPT | Mod: 25,S$GLB,, | Performed by: DERMATOLOGY

## 2020-08-06 PROCEDURE — 99213 PR OFFICE/OUTPT VISIT, EST, LEVL III, 20-29 MIN: ICD-10-PCS | Mod: 25,S$GLB,, | Performed by: DERMATOLOGY

## 2020-08-06 PROCEDURE — 17000 DESTRUCT PREMALG LESION: CPT | Mod: S$GLB,,, | Performed by: DERMATOLOGY

## 2020-08-06 PROCEDURE — 99999 PR PBB SHADOW E&M-EST. PATIENT-LVL III: ICD-10-PCS | Mod: PBBFAC,,, | Performed by: DERMATOLOGY

## 2020-08-06 NOTE — PROGRESS NOTES
Subjective:       Patient ID:  Kike Smith is a 64 y.o. male who presents for   Chief Complaint   Patient presents with    Skin Check     f/u SCC R forearm     Hx of SCC of the right forearm (s/p Mohs by Dr. Nelson on 7/10/20), lentigines, mult nevi and SK's, last seen on 6/18/20.  Denies bleeding, tender, growing, or concerning lesions. This is a high risk patient here to check for the development of new lesions.            Review of Systems   Constitutional: Negative for fever and chills.   Gastrointestinal: Negative for nausea and vomiting.   Skin: Positive for activity-related sunscreen use. Negative for daily sunscreen use and recent sunburn.   Hematologic/Lymphatic: Does not bruise/bleed easily.        Objective:    Physical Exam   Constitutional: He appears well-developed and well-nourished. No distress.   Neurological: He is alert and oriented to person, place, and time. He is not disoriented.   Psychiatric: He has a normal mood and affect.   Skin:   Areas Examined (abnormalities noted in diagram):   Scalp / Hair Palpated and Inspected  Head / Face Inspection Performed  Neck Inspection Performed  Chest / Axilla Inspection Performed  Abdomen Inspection Performed  Back Inspection Performed  RUE Inspected  LUE Inspection Performed  Nails and Digits Inspection Performed                   Diagram Legend     Erythematous scaling macule/papule c/w actinic keratosis       Vascular papule c/w angioma      Pigmented verrucoid papule/plaque c/w seborrheic keratosis      Yellow umbilicated papule c/w sebaceous hyperplasia      Irregularly shaped tan macule c/w lentigo     1-2 mm smooth white papules consistent with Milia      Movable subcutaneous cyst with punctum c/w epidermal inclusion cyst      Subcutaneous movable cyst c/w pilar cyst      Firm pink to brown papule c/w dermatofibroma      Pedunculated fleshy papule(s) c/w skin tag(s)      Evenly pigmented macule c/w junctional nevus     Mildly variegated  pigmented, slightly irregular-bordered macule c/w mildly atypical nevus      Flesh colored to evenly pigmented papule c/w intradermal nevus       Pink pearly papule/plaque c/w basal cell carcinoma      Erythematous hyperkeratotic cursted plaque c/w SCC      Surgical scar with no sign of skin cancer recurrence      Open and closed comedones      Inflammatory papules and pustules      Verrucoid papule consistent consistent with wart     Erythematous eczematous patches and plaques     Dystrophic onycholytic nail with subungual debris c/w onychomycosis     Umbilicated papule    Erythematous-base heme-crusted tan verrucoid plaque consistent with inflamed seborrheic keratosis     Erythematous Silvery Scaling Plaque c/w Psoriasis     See annotation      Assessment / Plan:        Scar conditions/skin fibrosis  Screening, malignant neoplasm, skin  History of skin cancer  Scar of the right forearm, hx of NMSC.  No evidence of recurrence on physical exam today.  Continue routine skin surveillance. Daily sunscreen advised.    Seborrheic keratosis  Hemangioma of skin  Reassurance given. Discussed diagnosis and that lesions are benign.  AAD handout given.     Actinic keratoses  Cryosurgery Procedure Note    The patient is informed of the precancerous quality and need for treatment of these lesions. After risks, benefits and alternatives explained, including blistering, pain, hyper- and hypopigmentation, patient verbally consents to cryotherapy to precancerous lesions. Liquid nitrogen cryosurgery is applied to the 1 actinic keratoses, as detailed in the physical exam, to produce a freeze injury. The patient is aware that blisters may form and is instructed on wound care with gentle cleansing and use of vaseline ointment to keep moist until healed. The patient is supplied a handout on cryosurgery and is instructed to call if lesions do not completely resolve.             Follow up in about 6 months (around 2/6/2021).

## 2020-08-06 NOTE — PATIENT INSTRUCTIONS

## 2020-08-11 ENCOUNTER — HOSPITAL ENCOUNTER (OUTPATIENT)
Dept: PREADMISSION TESTING | Facility: HOSPITAL | Age: 65
Discharge: HOME | End: 2020-08-11
Attending: SURGERY
Payer: COMMERCIAL

## 2020-08-11 VITALS
HEART RATE: 70 BPM | SYSTOLIC BLOOD PRESSURE: 122 MMHG | RESPIRATION RATE: 16 BRPM | TEMPERATURE: 98 F | OXYGEN SATURATION: 97 % | DIASTOLIC BLOOD PRESSURE: 72 MMHG

## 2020-08-11 DIAGNOSIS — Z01.818 PREOP TESTING: Primary | ICD-10-CM

## 2020-08-11 DIAGNOSIS — N40.0 BENIGN PROSTATIC HYPERPLASIA, UNSPECIFIED WHETHER LOWER URINARY TRACT SYMPTOMS PRESENT: ICD-10-CM

## 2020-08-11 DIAGNOSIS — E78.5 HYPERLIPIDEMIA, UNSPECIFIED HYPERLIPIDEMIA TYPE: ICD-10-CM

## 2020-08-11 DIAGNOSIS — K40.90 INGUINAL HERNIA OF LEFT SIDE WITHOUT OBSTRUCTION OR GANGRENE: ICD-10-CM

## 2020-08-11 LAB
BASOPHILS # BLD AUTO: 0.03 K/UL (ref 0–0.2)
BASOPHILS NFR BLD: 0.5 % (ref 0–1.9)
DIFFERENTIAL METHOD: NORMAL
EOSINOPHIL # BLD AUTO: 0.1 K/UL (ref 0–0.5)
EOSINOPHIL NFR BLD: 1.6 % (ref 0–8)
ERYTHROCYTE [DISTWIDTH] IN BLOOD BY AUTOMATED COUNT: 11.9 % (ref 11.5–14.5)
HCT VFR BLD AUTO: 44.7 % (ref 40–54)
HGB BLD-MCNC: 15 G/DL (ref 14–18)
IMM GRANULOCYTES # BLD AUTO: 0.01 K/UL (ref 0–0.04)
IMM GRANULOCYTES NFR BLD AUTO: 0.2 % (ref 0–0.5)
LYMPHOCYTES # BLD AUTO: 2 K/UL (ref 1–4.8)
LYMPHOCYTES NFR BLD: 33.1 % (ref 18–48)
MCH RBC QN AUTO: 30.8 PG (ref 27–31)
MCHC RBC AUTO-ENTMCNC: 33.6 G/DL (ref 32–36)
MCV RBC AUTO: 92 FL (ref 82–98)
MONOCYTES # BLD AUTO: 0.4 K/UL (ref 0.3–1)
MONOCYTES NFR BLD: 6.1 % (ref 4–15)
NEUTROPHILS # BLD AUTO: 3.6 K/UL (ref 1.8–7.7)
NEUTROPHILS NFR BLD: 58.7 % (ref 38–73)
NRBC BLD-RTO: 0 /100 WBC
PLATELET # BLD AUTO: 240 K/UL (ref 150–350)
PMV BLD AUTO: 9.9 FL (ref 9.2–12.9)
RBC # BLD AUTO: 4.87 M/UL (ref 4.6–6.2)
WBC # BLD AUTO: 6.1 K/UL (ref 3.9–12.7)

## 2020-08-11 PROCEDURE — 85025 COMPLETE CBC W/AUTO DIFF WBC: CPT

## 2020-08-11 PROCEDURE — 93010 ELECTROCARDIOGRAM REPORT: CPT | Mod: ,,, | Performed by: INTERNAL MEDICINE

## 2020-08-11 PROCEDURE — 93010 EKG 12-LEAD: ICD-10-PCS | Mod: ,,, | Performed by: INTERNAL MEDICINE

## 2020-08-11 PROCEDURE — 93005 ELECTROCARDIOGRAM TRACING: CPT

## 2020-08-11 PROCEDURE — U0003 INFECTIOUS AGENT DETECTION BY NUCLEIC ACID (DNA OR RNA); SEVERE ACUTE RESPIRATORY SYNDROME CORONAVIRUS 2 (SARS-COV-2) (CORONAVIRUS DISEASE [COVID-19]), AMPLIFIED PROBE TECHNIQUE, MAKING USE OF HIGH THROUGHPUT TECHNOLOGIES AS DESCRIBED BY CMS-2020-01-R: HCPCS

## 2020-08-11 RX ORDER — FAMOTIDINE 20 MG/1
20 TABLET, FILM COATED ORAL
Status: CANCELLED | OUTPATIENT
Start: 2020-08-11 | End: 2020-08-11

## 2020-08-11 RX ORDER — ALPRAZOLAM 0.5 MG/1
0.5 TABLET ORAL ONCE AS NEEDED
Status: CANCELLED | OUTPATIENT
Start: 2020-08-11 | End: 2032-01-08

## 2020-08-11 RX ORDER — ACETAMINOPHEN 500 MG
1000 TABLET ORAL
Status: CANCELLED | OUTPATIENT
Start: 2020-08-11 | End: 2020-08-11

## 2020-08-11 NOTE — DISCHARGE INSTRUCTIONS
To confirm, Your doctor has instructed you that surgery is scheduled for 8/14/20 *.       Please report to [facility] .  Pre admit office will call afternoon prior to surgery with final arrival time    INSTRUCTIONS IMPORTANT!!!   Do not eat, drink, or smoke after 12 midnight-including water. OK to brush teeth, no gum, candy or mints!    ¨ Take only these medicines with a small swallow of water-morning of surgery.  None    Pre operative instructions:  Please review the Pre-Operative Instruction booklet that you were given.        Bathing Instructions--See page 6 in the Pre-operative booklet.      Prevention of surgical site infections:     -Keep incisions clean and dry.   -Do not soak/submerge incisions in water until completely healed.   -Do not apply lotions, powders, creams, or deodorants to site.   -Always make sure hands are cleaned with antibacterial soap/ alcohol-based  prior to touching the surgical site.  (This includes doctors, nurses, staff, and yourself.)    Signs and symptoms:   -Redness and pain around the area where you had surgery   -Drainage of cloudy fluid from your surgical wound   -Fever over 100.4       I have read or had read and explained to me, and understand the above information.  Additional comments or instructions:  Received a copy of Pre-operative instructions booklet, FAQ surgical site infection sheet, and packets of hibiclens (if indicated).

## 2020-08-11 NOTE — ASSESSMENT & PLAN NOTE
The patient is scheduled for a Left inguinal hernia repair on 8/14/20 by Dr. Araujo     Known risk factors for perioperative complications: HTN    Difficulty with intubation is not anticipated.    Cardiac Risk Estimation: low intraoperative cardiac risk     1.) Preoperative workup as follows: ECG, hemoglobin, hematocrit, electrolytes, creatinine, glucose.  2.) Change in medication regimen before surgery: discontinue ASA from no until after surgery  3.) Prophylaxis for cardiac events with perioperative beta-blockers: not indicated.  4.) Invasive hemodynamic monitoring perioperatively: not indicated.  5.) Deep vein thrombosis prophylaxis postoperatively: regimen to be chosen by surgical team.  6.) Surveillance for postoperative MI with ECG immediately postoperatively and on postoperati ve days 1 and 2 AND troponin levels 24 hours postoperatively and on day 4 or hospital discharge (whichever comes first): not indicated.  7.) Current medications which may produce withdrawal symptoms if withheld perioperatively: none   8.) Other measures: None

## 2020-08-11 NOTE — H&P
Preoperative History and Physical                                                             Hospital Medicine      Chief Complaint: Preoperative evaluation     History of Present Illness:      Kike Smith is a 64 y.o. male who presents to the office today for a preoperative consultation at the request of Dr. Araujo who plans on performing Left inguinal hernia repair on 8/14/20     Functional Status:      The patient is able to climb a flight of stairs. The patient is able to ambulate over 3 blocks without difficulty. The patient's functional status is not affected by the surgical problem. The patient's functional status is not affected by shortness of breath, chest pain, dyspnea on exertion and fatigue.    MET score greater than 4    Past Medical History:      Past Medical History:   Diagnosis Date    Allergy     Benign hematuria     s/p urol/cysto    BPH (benign prostatic hyperplasia)     History of colon polyps 1/25/2018    He has had colon polyps since he was in his 30's.  He has had multiple colonoscopies and has had adenomatous polyps.    Hyperlipidemia     Hypertension     Squamous cell carcinoma skin of arm      r arm;s/p mohs        Past Surgical History:      Past Surgical History:   Procedure Laterality Date    CLOSURE OF DEFECT OF MOHS PROCEDURE  07/10/2020    dr reynoso    COLONOSCOPY N/A 1/25/2018    Procedure: COLONOSCOPY;  Surgeon: Juan A Walter MD;  Location: [facility];  Service: Endoscopy;  Laterality: N/A;    KNEE ARTHROSCOPY Left     ORIF TIBIA & FIBULA FRACTURES Left 08/10/2019    tib/fib fx        Social History:      Social History     Socioeconomic History    Marital status:      Spouse name: Not on file    Number of children: Not on file    Years of education: Not on file    Highest education level: Not on file   Occupational History    Not on file   Social Needs    Financial resource strain: Not hard at all     Food insecurity     Worry: Never true     Inability: Never true    Transportation needs     Medical: No     Non-medical: No   Tobacco Use    Smoking status: Never Smoker    Smokeless tobacco: Never Used   Substance and Sexual Activity    Alcohol use: No     Frequency: Never     Binge frequency: Never     Comment: quit 1983    Drug use: Never    Sexual activity: Yes     Partners: Female   Lifestyle    Physical activity     Days per week: 4 days     Minutes per session: 30 min    Stress: Only a little   Relationships    Social connections     Talks on phone: More than three times a week     Gets together: More than three times a week     Attends [employer] service: Not on file     Active member of club or organization: No     Attends meetings of clubs or organizations: Never     Relationship status:    Other Topics Concern    Not on file   Social History Narrative     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 16        Family History:      Family History   Problem Relation Age of Onset    Macular degeneration Maternal Uncle     Bladder Cancer Brother         smoker    Cataracts Mother     Fanconi anemia Mother     Leukemia Father 80    Coronary artery disease Father     Heart attack Father 72    Multiple myeloma Sister 59       Allergies:      Review of patient's allergies indicates:  No Known Allergies    Medications:      Current Outpatient Medications   Medication Sig    aspirin 81 MG Chew Take 81 mg by mouth once daily. Hold today until after surgery    azelastine (ASTELIN) 137 mcg (0.1 %) nasal spray 1 spray by Nasal route 2 (two) times daily.     fluticasone propionate (FLONASE) 50 mcg/actuation nasal spray 2 sprays by Each Nostril route once daily.    hydroCHLOROthiazide (HYDRODIURIL) 25 MG tablet TAKE 1 TABLET BY MOUTH ONCE DAILY (Patient taking differently: Take 25 mg by mouth once  daily. )    Lactobacillus acidophilus (PROBIOTIC) 10 billion cell Cap Take 1 tablet by mouth once daily.     pravastatin (PRAVACHOL) 40 MG tablet TAKE 1 TABLET BY MOUTH ONCE DAILY    tamsulosin (FLOMAX) 0.4 mg Cap TAKE 1 CAPSULE(0.4 MG) BY MOUTH EVERY DAY    vitamin D (VITAMIN D3) 1000 units Tab Take 1,000 Units by mouth once daily.    EPINEPHrine (EPIPEN) 0.3 mg/0.3 mL AtIn Auvi-Q 0.3 mg/0.3 mL injection, auto-injector   Take 1 auto as needed by injection route as directed.     No current facility-administered medications for this encounter.        Vitals:      Vitals:    08/11/20 0830   BP: 122/72   Pulse: 70   Resp: 16   Temp: 98 °F (36.7 °C)       Review of Systems:        Constitutional: Negative for fever, chills, weight loss, malaise/fatigue and diaphoresis.   HENT: Negative for hearing loss, ear pain, nosebleeds, congestion, sore throat, neck pain, tinnitus and ear discharge.    Eyes: Negative for blurred vision, double vision, photophobia, pain, discharge and redness.   Respiratory: Negative for cough, hemoptysis, sputum production, shortness of breath, wheezing and stridor.    Cardiovascular: Negative for chest pain, palpitations, orthopnea, claudication, leg swelling and PND.   Gastrointestinal: +asymmetry with his left groin versus his right groin-no change from prior exam by Dr. Araujo  Negative for heartburn, nausea, vomiting, abdominal pain, diarrhea, constipation, blood in stool and melena.   Genitourinary: Negative for dysuria, urgency, frequency, hematuria and flank pain.   Musculoskeletal: Negative for myalgias, back pain, joint pain and falls.   Skin: Negative for itching and rash.   Neurological: Negative for dizziness, tingling, tremors, sensory change, speech change, focal weakness, seizures, loss of consciousness, weakness and headaches.   Endo/Heme/Allergies: Negative for environmental allergies and polydipsia. Does not bruise/bleed easily.   Psychiatric/Behavioral: Negative for  depression, suicidal ideas, hallucinations, memory loss and substance abuse. The patient is not nervous/anxious and does not have insomnia.    All 14 systems reviewed and negative except as noted above.    Physical Exam:      Constitutional: Appears well-developed, well-nourished and in no acute distress.  Patient is oriented to person, place, and time.   Head: Normocephalic and atraumatic. Mucous membranes moist.  Neck: Neck supple no mass.   Cardiovascular: Normal rate and regular rhythm.  S1 S2 appreciated by ascultation.  Pulmonary/Chest: Effort normal and clear to auscultation bilaterally. No respiratory distress.   Abdomen: Soft. Non-tender and non-distended. Bowel sounds are normal.   Neurological: Patient is alert and oriented to person, place and time. Moves all extremities.  Skin: Warm and dry. No lesions.  Extremities: No clubbing, cyanosis or edema.    Laboratory data:      Reviewed and noted in plan where applicable. Please see chart for full laboratory data.    No results for input(s): CPK, CPKMB, TROPONINI, MB in the last 24 hours. No results for input(s): POCTGLUCOSE in the last 24 hours.     No results found for: INR, PROTIME    Lab Results   Component Value Date    WBC 6.10 08/11/2020    HGB 15.0 08/11/2020    HCT 44.7 08/11/2020    MCV 92 08/11/2020     08/11/2020       No results for input(s): GLU, NA, K, CL, CO2, BUN, CREATININE, CALCIUM, MG in the last 24 hours.    Predictors of intubation difficulty:       Morbid obesity? no   Anatomically abnormal facies? no   Prominent incisors? no   Receding mandible? no   Short, thick neck? no   Neck range of motion: normal   Dentition: No chipped, loose, or missing teeth.    Cardiographics:      ECG: normal sinus rhythm, no blocks or conduction defects, no ischemic changes 8/11/20    Imaging:      Chest x-ray:9/5/19  The lungs are clear, with normal appearance of pulmonary vasculature and no pleural effusion or pneumothorax.   The cardiac  silhouette is normal in size. The hilar and mediastinal contours are unremarkable.   Bones are intact.    Assessment and Plan:      Inguinal hernia of left side without obstruction or gangrene  The patient is scheduled for a Left inguinal hernia repair on 8/14/20 by Dr. Araujo     Known risk factors for perioperative complications: HTN    Difficulty with intubation is not anticipated.    Cardiac Risk Estimation: low intraoperative cardiac risk     1.) Preoperative workup as follows: ECG, hemoglobin, hematocrit, electrolytes, creatinine, glucose.  2.) Change in medication regimen before surgery: discontinue ASA from no until after surgery  3.) Prophylaxis for cardiac events with perioperative beta-blockers: not indicated.  4.) Invasive hemodynamic monitoring perioperatively: not indicated.  5.) Deep vein thrombosis prophylaxis postoperatively: regimen to be chosen by surgical team.  6.) Surveillance for postoperative MI with ECG immediately postoperatively and on postoperati ve days 1 and 2 AND troponin levels 24 hours postoperatively and on day 4 or hospital discharge (whichever comes first): not indicated.  7.) Current medications which may produce withdrawal symptoms if withheld perioperatively: none   8.) Other measures: None     Hypertension  /72 in PAT  Cont HCTZ- Hold Am of surgery    Hyperlipidemia  Cont Pravastatin     BPH (benign prostatic hyperplasia)  Cont Flomax

## 2020-08-12 LAB — SARS-COV-2 RNA RESP QL NAA+PROBE: NOT DETECTED

## 2020-08-13 ENCOUNTER — TELEPHONE (OUTPATIENT)
Dept: PREADMISSION TESTING | Facility: HOSPITAL | Age: 65
End: 2020-08-13

## 2020-08-13 ENCOUNTER — ANESTHESIA EVENT (OUTPATIENT)
Dept: SURGERY | Facility: HOSPITAL | Age: 65
End: 2020-08-13
Payer: COMMERCIAL

## 2020-08-13 PROBLEM — K63.5 COLON POLYP: Status: RESOLVED | Noted: 2018-01-25 | Resolved: 2020-08-13

## 2020-08-13 PROBLEM — E78.5 HYPERLIPIDEMIA: Chronic | Status: ACTIVE | Noted: 2020-08-11

## 2020-08-13 PROBLEM — Z12.11 SPECIAL SCREENING FOR MALIGNANT NEOPLASMS, COLON: Status: RESOLVED | Noted: 2018-01-25 | Resolved: 2020-08-13

## 2020-08-13 PROBLEM — N40.0 BPH (BENIGN PROSTATIC HYPERPLASIA): Chronic | Status: ACTIVE | Noted: 2020-08-11

## 2020-08-13 PROBLEM — R51.9 NONINTRACTABLE HEADACHE: Status: RESOLVED | Noted: 2018-06-28 | Resolved: 2020-08-13

## 2020-08-13 NOTE — TELEPHONE ENCOUNTER
ANGIE. Arrival time 0800 @ [facility]. NPO status reinforced. Medications reviewed. Visitor policy discussed.

## 2020-08-13 NOTE — ANESTHESIA PREPROCEDURE EVALUATION
08/13/2020  Kike Smith is a 64 y.o., male.    Anesthesia Evaluation    I have reviewed the Patient Summary Reports.    I have reviewed the Nursing Notes. I have reviewed the NPO Status.   I have reviewed the Medications.     Review of Systems  Anesthesia Hx:  History of prior surgery of interest to airway management or planning:  Denies Personal Hx of Anesthesia complications.   Social:  Non-Smoker    Cardiovascular:   Hypertension no hyperlipidemia ECG has been reviewed.    Pulmonary:   Denies Asthma.  Denies Shortness of breath.  Denies Recent URI.    Renal/:   BPH    Hepatic/GI:   Denies GERD.        Physical Exam  General:  Well nourished, Obesity    Airway/Jaw/Neck:  Airway Findings: Mouth Opening: Normal Tongue: Large  General Airway Assessment: Adult, Average  Mallampati: III  TM Distance: Normal, at least 6 cm  Jaw/Neck Findings:  Neck ROM: Normal ROM      Dental:  Dental Findings: In tact, Periodontal disease, Mild        Mental Status:  Mental Status Findings:  Cooperative, Alert and Oriented         Anesthesia Plan  Type of Anesthesia, risks & benefits discussed:  Anesthesia Type:  general  Patient's Preference:   Intra-op Monitoring Plan: standard ASA monitors  Intra-op Monitoring Plan Comments:   Post Op Pain Control Plan: per primary service following discharge from PACU  Post Op Pain Control Plan Comments:   Induction:   IV  Beta Blocker:  Patient is not currently on a Beta-Blocker (No further documentation required).       Informed Consent: Patient understands risks and agrees with Anesthesia plan.  Questions answered. Anesthesia consent signed with patient.  ASA Score: 2     Day of Surgery Review of History & Physical:    H&P update referred to the surgeon.         Ready For Surgery From Anesthesia Perspective.

## 2020-08-14 ENCOUNTER — HOSPITAL ENCOUNTER (OUTPATIENT)
Facility: HOSPITAL | Age: 65
Discharge: HOME | End: 2020-08-14
Attending: SURGERY | Admitting: SURGERY
Payer: COMMERCIAL

## 2020-08-14 ENCOUNTER — ANESTHESIA (OUTPATIENT)
Dept: SURGERY | Facility: HOSPITAL | Age: 65
End: 2020-08-14
Payer: COMMERCIAL

## 2020-08-14 VITALS
DIASTOLIC BLOOD PRESSURE: 71 MMHG | TEMPERATURE: 98 F | BODY MASS INDEX: 29.57 KG/M2 | OXYGEN SATURATION: 99 % | WEIGHT: 206.56 LBS | RESPIRATION RATE: 16 BRPM | HEIGHT: 70 IN | SYSTOLIC BLOOD PRESSURE: 134 MMHG | HEART RATE: 60 BPM

## 2020-08-14 DIAGNOSIS — K40.90 LEFT INGUINAL HERNIA: ICD-10-CM

## 2020-08-14 DIAGNOSIS — K40.90 INGUINAL HERNIA OF LEFT SIDE WITHOUT OBSTRUCTION OR GANGRENE: Primary | ICD-10-CM

## 2020-08-14 PROCEDURE — D9220A PRA ANESTHESIA: ICD-10-PCS | Mod: CRNA,,, | Performed by: NURSE ANESTHETIST, CERTIFIED REGISTERED

## 2020-08-14 PROCEDURE — 49650 LAP ING HERNIA REPAIR INIT: CPT | Mod: LT,,, | Performed by: SURGERY

## 2020-08-14 PROCEDURE — 37000008 HC ANESTHESIA 1ST 15 MINUTES: Performed by: SURGERY

## 2020-08-14 PROCEDURE — D9220A PRA ANESTHESIA: ICD-10-PCS | Mod: ANES,,, | Performed by: ANESTHESIOLOGY

## 2020-08-14 PROCEDURE — 71000033 HC RECOVERY, INTIAL HOUR: Performed by: SURGERY

## 2020-08-14 PROCEDURE — 63600175 PHARM REV CODE 636 W HCPCS: Performed by: ANESTHESIOLOGY

## 2020-08-14 PROCEDURE — 36000711: Performed by: SURGERY

## 2020-08-14 PROCEDURE — 25000003 PHARM REV CODE 250: Performed by: SURGERY

## 2020-08-14 PROCEDURE — 25000003 PHARM REV CODE 250: Performed by: NURSE ANESTHETIST, CERTIFIED REGISTERED

## 2020-08-14 PROCEDURE — 63600175 PHARM REV CODE 636 W HCPCS: Performed by: SURGERY

## 2020-08-14 PROCEDURE — D9220A PRA ANESTHESIA: Mod: ANES,,, | Performed by: ANESTHESIOLOGY

## 2020-08-14 PROCEDURE — 36000710: Performed by: SURGERY

## 2020-08-14 PROCEDURE — 25000003 PHARM REV CODE 250: Performed by: ANESTHESIOLOGY

## 2020-08-14 PROCEDURE — 25000003 PHARM REV CODE 250: Performed by: NURSE PRACTITIONER

## 2020-08-14 PROCEDURE — 37000009 HC ANESTHESIA EA ADD 15 MINS: Performed by: SURGERY

## 2020-08-14 PROCEDURE — 27201423 OPTIME MED/SURG SUP & DEVICES STERILE SUPPLY: Performed by: SURGERY

## 2020-08-14 PROCEDURE — 49650 PR LAP,INGUINAL HERNIA REPR,INITIAL: ICD-10-PCS | Mod: LT,,, | Performed by: SURGERY

## 2020-08-14 PROCEDURE — C1781 MESH (IMPLANTABLE): HCPCS | Performed by: SURGERY

## 2020-08-14 PROCEDURE — D9220A PRA ANESTHESIA: Mod: CRNA,,, | Performed by: NURSE ANESTHETIST, CERTIFIED REGISTERED

## 2020-08-14 PROCEDURE — 71000015 HC POSTOP RECOV 1ST HR: Performed by: SURGERY

## 2020-08-14 PROCEDURE — 63600175 PHARM REV CODE 636 W HCPCS: Performed by: NURSE ANESTHETIST, CERTIFIED REGISTERED

## 2020-08-14 DEVICE — MESH 3DMAX LF MED 7.9CMX13.4CM: Type: IMPLANTABLE DEVICE | Site: INGUINAL | Status: FUNCTIONAL

## 2020-08-14 RX ORDER — ALPRAZOLAM 0.25 MG/1
0.25 TABLET ORAL ONCE AS NEEDED
Status: DISCONTINUED | OUTPATIENT
Start: 2020-08-14 | End: 2020-08-14

## 2020-08-14 RX ORDER — IBUPROFEN 800 MG/1
800 TABLET ORAL 3 TIMES DAILY
Qty: 30 TABLET | Refills: 0 | Status: SHIPPED | OUTPATIENT
Start: 2020-08-14 | End: 2022-04-07

## 2020-08-14 RX ORDER — HYDROCODONE BITARTRATE AND ACETAMINOPHEN 10; 325 MG/1; MG/1
1 TABLET ORAL EVERY 4 HOURS PRN
Status: DISCONTINUED | OUTPATIENT
Start: 2020-08-14 | End: 2020-08-14 | Stop reason: HOSPADM

## 2020-08-14 RX ORDER — ONDANSETRON 2 MG/ML
INJECTION INTRAMUSCULAR; INTRAVENOUS
Status: DISCONTINUED | OUTPATIENT
Start: 2020-08-14 | End: 2020-08-14

## 2020-08-14 RX ORDER — LIDOCAINE HYDROCHLORIDE 20 MG/ML
INJECTION, SOLUTION EPIDURAL; INFILTRATION; INTRACAUDAL; PERINEURAL
Status: DISCONTINUED | OUTPATIENT
Start: 2020-08-14 | End: 2020-08-14

## 2020-08-14 RX ORDER — LIDOCAINE HYDROCHLORIDE 10 MG/ML
INJECTION, SOLUTION EPIDURAL; INFILTRATION; INTRACAUDAL; PERINEURAL
Status: DISCONTINUED
Start: 2020-08-14 | End: 2020-08-14 | Stop reason: HOSPADM

## 2020-08-14 RX ORDER — ROCURONIUM BROMIDE 10 MG/ML
INJECTION, SOLUTION INTRAVENOUS
Status: DISCONTINUED | OUTPATIENT
Start: 2020-08-14 | End: 2020-08-14

## 2020-08-14 RX ORDER — LIDOCAINE HYDROCHLORIDE 10 MG/ML
INJECTION, SOLUTION EPIDURAL; INFILTRATION; INTRACAUDAL; PERINEURAL
Status: DISCONTINUED | OUTPATIENT
Start: 2020-08-14 | End: 2020-08-14 | Stop reason: HOSPADM

## 2020-08-14 RX ORDER — ACETAMINOPHEN 500 MG
1000 TABLET ORAL
Status: COMPLETED | OUTPATIENT
Start: 2020-08-14 | End: 2020-08-14

## 2020-08-14 RX ORDER — BUPIVACAINE HYDROCHLORIDE 2.5 MG/ML
INJECTION, SOLUTION EPIDURAL; INFILTRATION; INTRACAUDAL
Status: DISCONTINUED | OUTPATIENT
Start: 2020-08-14 | End: 2020-08-14 | Stop reason: HOSPADM

## 2020-08-14 RX ORDER — MEPERIDINE HYDROCHLORIDE 25 MG/ML
12.5 INJECTION INTRAMUSCULAR; INTRAVENOUS; SUBCUTANEOUS EVERY 10 MIN PRN
Status: DISCONTINUED | OUTPATIENT
Start: 2020-08-14 | End: 2020-08-14 | Stop reason: HOSPADM

## 2020-08-14 RX ORDER — FENTANYL CITRATE 50 UG/ML
25 INJECTION, SOLUTION INTRAMUSCULAR; INTRAVENOUS EVERY 5 MIN PRN
Status: DISCONTINUED | OUTPATIENT
Start: 2020-08-14 | End: 2020-08-14 | Stop reason: HOSPADM

## 2020-08-14 RX ORDER — FEXOFENADINE HCL 60 MG
60 TABLET ORAL DAILY
COMMUNITY
End: 2021-02-09

## 2020-08-14 RX ORDER — CEFAZOLIN SODIUM 2 G/50ML
2 SOLUTION INTRAVENOUS
Status: COMPLETED | OUTPATIENT
Start: 2020-08-14 | End: 2020-08-14

## 2020-08-14 RX ORDER — EPHEDRINE SULFATE 50 MG/ML
INJECTION, SOLUTION INTRAVENOUS
Status: DISCONTINUED | OUTPATIENT
Start: 2020-08-14 | End: 2020-08-14

## 2020-08-14 RX ORDER — MIDAZOLAM HYDROCHLORIDE 1 MG/ML
INJECTION, SOLUTION INTRAMUSCULAR; INTRAVENOUS
Status: DISCONTINUED | OUTPATIENT
Start: 2020-08-14 | End: 2020-08-14

## 2020-08-14 RX ORDER — SODIUM CHLORIDE, SODIUM LACTATE, POTASSIUM CHLORIDE, CALCIUM CHLORIDE 600; 310; 30; 20 MG/100ML; MG/100ML; MG/100ML; MG/100ML
INJECTION, SOLUTION INTRAVENOUS CONTINUOUS
Status: DISCONTINUED | OUTPATIENT
Start: 2020-08-14 | End: 2020-08-14 | Stop reason: HOSPADM

## 2020-08-14 RX ORDER — BUPIVACAINE HYDROCHLORIDE 2.5 MG/ML
INJECTION, SOLUTION EPIDURAL; INFILTRATION; INTRACAUDAL
Status: DISCONTINUED
Start: 2020-08-14 | End: 2020-08-14 | Stop reason: HOSPADM

## 2020-08-14 RX ORDER — ASPIRIN 81 MG/1
81 TABLET ORAL DAILY
COMMUNITY
End: 2022-04-21

## 2020-08-14 RX ORDER — NAPROXEN 250 MG/1
500 TABLET ORAL ONCE
Status: COMPLETED | OUTPATIENT
Start: 2020-08-14 | End: 2020-08-14

## 2020-08-14 RX ORDER — PROPOFOL 10 MG/ML
VIAL (ML) INTRAVENOUS
Status: DISCONTINUED | OUTPATIENT
Start: 2020-08-14 | End: 2020-08-14

## 2020-08-14 RX ORDER — HYDROCODONE BITARTRATE AND ACETAMINOPHEN 5; 325 MG/1; MG/1
1 TABLET ORAL EVERY 4 HOURS PRN
Status: DISCONTINUED | OUTPATIENT
Start: 2020-08-14 | End: 2020-08-14 | Stop reason: HOSPADM

## 2020-08-14 RX ORDER — FENTANYL CITRATE 50 UG/ML
INJECTION, SOLUTION INTRAMUSCULAR; INTRAVENOUS
Status: DISCONTINUED | OUTPATIENT
Start: 2020-08-14 | End: 2020-08-14

## 2020-08-14 RX ORDER — LIDOCAINE HYDROCHLORIDE 10 MG/ML
1 INJECTION, SOLUTION EPIDURAL; INFILTRATION; INTRACAUDAL; PERINEURAL ONCE
Status: DISCONTINUED | OUTPATIENT
Start: 2020-08-14 | End: 2020-08-14

## 2020-08-14 RX ORDER — LIDOCAINE HYDROCHLORIDE 10 MG/ML
0.5 INJECTION, SOLUTION EPIDURAL; INFILTRATION; INTRACAUDAL; PERINEURAL ONCE
Status: DISCONTINUED | OUTPATIENT
Start: 2020-08-14 | End: 2020-08-14

## 2020-08-14 RX ORDER — SODIUM CHLORIDE 9 MG/ML
INJECTION, SOLUTION INTRAVENOUS CONTINUOUS
Status: DISCONTINUED | OUTPATIENT
Start: 2020-08-14 | End: 2020-08-14 | Stop reason: HOSPADM

## 2020-08-14 RX ORDER — GLYCOPYRROLATE 0.2 MG/ML
INJECTION INTRAMUSCULAR; INTRAVENOUS
Status: DISCONTINUED | OUTPATIENT
Start: 2020-08-14 | End: 2020-08-14

## 2020-08-14 RX ORDER — NEOSTIGMINE METHYLSULFATE 0.5 MG/ML
INJECTION, SOLUTION INTRAVENOUS
Status: DISCONTINUED | OUTPATIENT
Start: 2020-08-14 | End: 2020-08-14

## 2020-08-14 RX ORDER — DEXAMETHASONE SODIUM PHOSPHATE 4 MG/ML
INJECTION, SOLUTION INTRA-ARTICULAR; INTRALESIONAL; INTRAMUSCULAR; INTRAVENOUS; SOFT TISSUE
Status: DISCONTINUED | OUTPATIENT
Start: 2020-08-14 | End: 2020-08-14

## 2020-08-14 RX ORDER — FAMOTIDINE 20 MG/1
20 TABLET, FILM COATED ORAL
Status: COMPLETED | OUTPATIENT
Start: 2020-08-14 | End: 2020-08-14

## 2020-08-14 RX ADMIN — SODIUM CHLORIDE, SODIUM LACTATE, POTASSIUM CHLORIDE, AND CALCIUM CHLORIDE: 600; 310; 30; 20 INJECTION, SOLUTION INTRAVENOUS at 10:08

## 2020-08-14 RX ADMIN — DEXAMETHASONE SODIUM PHOSPHATE 8 MG: 4 INJECTION, SOLUTION INTRA-ARTICULAR; INTRALESIONAL; INTRAMUSCULAR; INTRAVENOUS; SOFT TISSUE at 09:08

## 2020-08-14 RX ADMIN — FAMOTIDINE 20 MG: 20 TABLET ORAL at 08:08

## 2020-08-14 RX ADMIN — GLYCOPYRROLATE 0.4 MG: 0.2 INJECTION, SOLUTION INTRAMUSCULAR; INTRAVENOUS at 10:08

## 2020-08-14 RX ADMIN — SODIUM CHLORIDE, SODIUM LACTATE, POTASSIUM CHLORIDE, AND CALCIUM CHLORIDE: 600; 310; 30; 20 INJECTION, SOLUTION INTRAVENOUS at 08:08

## 2020-08-14 RX ADMIN — NAPROXEN 500 MG: 250 TABLET ORAL at 08:08

## 2020-08-14 RX ADMIN — MIDAZOLAM HYDROCHLORIDE 2 MG: 1 INJECTION, SOLUTION INTRAMUSCULAR; INTRAVENOUS at 09:08

## 2020-08-14 RX ADMIN — CEFAZOLIN SODIUM 2 G: 2 SOLUTION INTRAVENOUS at 09:08

## 2020-08-14 RX ADMIN — PROPOFOL 150 MG: 10 INJECTION, EMULSION INTRAVENOUS at 09:08

## 2020-08-14 RX ADMIN — FENTANYL CITRATE 100 MCG: 50 INJECTION, SOLUTION INTRAMUSCULAR; INTRAVENOUS at 09:08

## 2020-08-14 RX ADMIN — LIDOCAINE HYDROCHLORIDE 100 MG: 20 INJECTION, SOLUTION EPIDURAL; INFILTRATION; INTRACAUDAL at 09:08

## 2020-08-14 RX ADMIN — ACETAMINOPHEN 1000 MG: 500 TABLET ORAL at 08:08

## 2020-08-14 RX ADMIN — ONDANSETRON 4 MG: 2 INJECTION INTRAMUSCULAR; INTRAVENOUS at 10:08

## 2020-08-14 RX ADMIN — EPHEDRINE SULFATE 10 MG: 50 INJECTION INTRAVENOUS at 09:08

## 2020-08-14 RX ADMIN — ROCURONIUM BROMIDE 40 MG: 10 INJECTION, SOLUTION INTRAVENOUS at 09:08

## 2020-08-14 RX ADMIN — NEOSTIGMINE METHYLSULFATE 4 MG: 0.5 INJECTION INTRAVENOUS at 10:08

## 2020-08-14 NOTE — INTERVAL H&P NOTE
The patient has been examined and the H&P has been reviewed:    I concur with the findings and no changes have occurred since H&P was written.    Surgery risks, benefits and alternative options discussed and understood by patient/family.          Active Hospital Problems    Diagnosis  POA    Left inguinal hernia [K40.90]  Yes    Inguinal hernia of left side without obstruction or gangrene [K40.90]  Yes      Resolved Hospital Problems   No resolved problems to display.

## 2020-08-14 NOTE — ANESTHESIA POSTPROCEDURE EVALUATION
Anesthesia Post Evaluation    Patient: Kike Smith    Procedure(s) Performed: Procedure(s) (LRB):  XI ROBOTIC REPAIR, HERNIA, INGUINAL (Left)    Final Anesthesia Type: general    Patient location during evaluation: PACU  Patient participation: Yes- Able to Participate  Level of consciousness: awake and alert and oriented  Post-procedure vital signs: reviewed and stable  Pain management: adequate  Airway patency: patent    PONV status at discharge: No PONV  Anesthetic complications: no      Cardiovascular status: hemodynamically stable  Respiratory status: unassisted, spontaneous ventilation and room air  Hydration status: euvolemic  Follow-up not needed.          Vitals Value Taken Time   /93 08/14/20 1123   Temp 36.5 °C (97.7 °F) 08/14/20 1111   Pulse 59 08/14/20 1125   Resp 18 08/14/20 1120   SpO2 99 % 08/14/20 1125   Vitals shown include unvalidated device data.      No case tracking events are documented in the log.      Pain/Renée Score: Pain Rating Prior to Med Admin: 0 (8/14/2020  8:40 AM)  Renée Score: 10 (8/14/2020 11:11 AM)

## 2020-08-14 NOTE — TRANSFER OF CARE
"Anesthesia Transfer of Care Note    Patient: Kike Smith    Procedure(s) Performed: Procedure(s) (LRB):  XI ROBOTIC REPAIR, HERNIA, INGUINAL (Left)    Patient location: PACU    Anesthesia Type: general    Transport from OR: Transported from OR on room air with adequate spontaneous ventilation    Post pain: adequate analgesia    Post assessment: no apparent anesthetic complications and tolerated procedure well    Post vital signs: stable    Level of consciousness: awake and oriented    Nausea/Vomiting: no nausea/vomiting    Complications: none    Transfer of care protocol was followed      Last vitals:   Visit Vitals  BP (!) 142/83 (BP Location: Right arm, Patient Position: Sitting)   Pulse 69   Temp 36.4 °C (97.5 °F) (Temporal)   Resp 18   Ht 5' 10" (1.778 m)   Wt 93.7 kg (206 lb 9.1 oz)   SpO2 97%   BMI 29.64 kg/m²     "

## 2020-08-14 NOTE — OP NOTE
[facility]  Surgery Department  Operative Note    SUMMARY     Date of Procedure: 8/14/2020     Procedure: Procedure(s) (LRB):  XI ROBOTIC REPAIR, HERNIA, INGUINAL (Left)     Surgeon(s) and Role:     * Mark Anthony Araujo MD - Primary    Assisting Surgeon: None    Pre-Operative Diagnosis: Left inguinal hernia [K40.90]    Post-Operative Diagnosis: Post-Op Diagnosis Codes:     * Left inguinal hernia [K40.90]    Anesthesia: General    Technical Procedures Used:  Robotic left inguinal hernia repair    Description of the Findings of the Procedure:  Left inguinal direct and indirect hernia    Complications: No    Estimated Blood Loss (EBL):  5 cc           Implants:   Implant Name Type Inv. Item Serial No.  Lot No. LRB No. Used Action   MESH 3DMAX LF MED [id number] - [id number]  MESH 3DMAX LF MED 7.9CMX13.4CM  [employer] [id number] Left 1 Implanted       Specimens:   Specimen (12h ago, onward)    None                  Condition: Good    Disposition: PACU - hemodynamically stable.    Procedure in detail:  The patient was brought to the OR and underwent general anesthesia.  He was prepped and draped in usual sterile fashion.  An umbilical incision was made and fascia elevated. A veres needle was placed and abdomen insufflated to 15mmHg. An 8mm robotic port was placed  using the optiview technique and no apparent injuries were noted. Two 8mm robotic ports were placed in the right and left mid abdomen. The robot was docked. The patient was then placed in a steep trendelenburg position was taken, and visualized the inguinal areas.      An indirect and direct left inguinal hernia was noted. The mobilization of the peritoneum was then commenced from the most lateral-inferior aspect and brought up well above the line of anterior superior iliac supine. The dissection was continued medially, identifying epigastric vessels, umbilical vein remnant on the left side, while visualizing the indirect defect. The  dissection was continued all the way medially to the Jalil's ligament, and visualized the pubic bone as well. The blunt dissection and sharp dissection was done to create a peritoneal flap. Then a 3D max mesh left side was brought into the port along with to a strata fix. The mesh was placed appropriately according to the manufacture's  Specifics. The mesh was then fixated onto the internal oblique and transversalis muscle and pubic tubercle. The peritoneal flap was closed with 2-0 statafix suture. The sutures were removed completely.     The  right  side was evaluated and no inguinal hernia noted.     The robot was undocked and ports removed under direct visualization. Local anesthetic was injected at the incision sites. The incisions were all closed using 4-0 monocryl and dermabond as applied. The patient tolerated the procedure in a stable and satisfactory condition

## 2020-08-14 NOTE — BRIEF OP NOTE
[facility]  Brief Operative Note    Surgery Date: 8/14/2020     Surgeon(s) and Role:     * Mark Anthony Araujo MD - Primary    Assisting Surgeon: None    Pre-op Diagnosis:  Left inguinal hernia [K40.90]    Post-op Diagnosis:  Post-Op Diagnosis Codes:     * Left inguinal hernia [K40.90]    Procedure(s) (LRB):  XI ROBOTIC REPAIR, HERNIA, INGUINAL (Left)    Anesthesia: General    Description of the findings of the procedure(s): see op note    Estimated Blood Loss: * No values recorded between 8/14/2020 10:07 AM and 8/14/2020 11:06 AM *         Specimens:   Specimen (12h ago, onward)    None            Discharge Note    OUTCOME: Patient tolerated treatment/procedure well without complication and is now ready for discharge.    DISPOSITION: Home or Self Care    FINAL DIAGNOSIS:  Left inguinal hernia repair    FOLLOWUP: In clinic    DISCHARGE INSTRUCTIONS:    Discharge Procedure Orders   Diet general     Lifting restrictions   Order Comments: No heavy lifting > 10lbs or strenuous activity     Call MD for:  temperature >100.4     Call MD for:  persistent nausea and vomiting     Call MD for:  severe uncontrolled pain     Call MD for:  difficulty breathing, headache or visual disturbances     Call MD for:  redness, tenderness, or signs of infection (pain, swelling, redness, odor or green/yellow discharge around incision site)     Call MD for:  hives     Call MD for:  persistent dizziness or light-headedness     Call MD for:  extreme fatigue     Remove dressing in 48 hours     Activity as tolerated     Shower on day dressing removed (No bath)   Order Comments: Ok to shower in 48 hours

## 2020-08-15 ENCOUNTER — NURSE TRIAGE (OUTPATIENT)
Dept: ADMINISTRATIVE | Facility: CLINIC | Age: 65
End: 2020-08-15

## 2020-08-15 NOTE — TELEPHONE ENCOUNTER
Hernia surgery yesterday done yesterday. No problems at surgical site. Had a catheter yesterday. Having pain with urination. No problems starting urine stream. No fever. No blood in urine. No other symptoms.  Secure chat message sent to Dr. Korey chavis who advised it will take some time for pain with urination to resolve. Approximately 24-48 hours. Already discussed with patient that time should resolve this issue and to call back if worsens tomorrow or no improvement. Discussed with patient drinking plenty of fluids to dilute urine.   Reason for Disposition   [1] Caller has URGENT question AND [2] triager unable to answer question    Additional Information   Negative: Sounds like a life-threatening emergency to the triager   Negative: Fever > 100.4 F (38.0 C)   Negative: [1] SEVERE post-op pain (e.g., excruciating, pain scale 8-10) AND [2] not controlled with pain medications    Protocols used: POST-OP SYMPTOMS AND [id number]

## 2020-08-31 ENCOUNTER — OFFICE VISIT (OUTPATIENT)
Dept: SURGERY | Facility: CLINIC | Age: 65
End: 2020-08-31
Payer: COMMERCIAL

## 2020-08-31 VITALS
WEIGHT: 206.81 LBS | HEIGHT: 70 IN | DIASTOLIC BLOOD PRESSURE: 80 MMHG | HEART RATE: 68 BPM | SYSTOLIC BLOOD PRESSURE: 125 MMHG | BODY MASS INDEX: 29.61 KG/M2 | TEMPERATURE: 98 F

## 2020-08-31 DIAGNOSIS — K40.90 LEFT INGUINAL HERNIA: Primary | ICD-10-CM

## 2020-08-31 PROCEDURE — 99024 PR POST-OP FOLLOW-UP VISIT: ICD-10-PCS | Mod: S$GLB,,, | Performed by: SURGERY

## 2020-08-31 PROCEDURE — 99999 PR PBB SHADOW E&M-EST. PATIENT-LVL IV: ICD-10-PCS | Mod: PBBFAC,,, | Performed by: SURGERY

## 2020-08-31 PROCEDURE — 99999 PR PBB SHADOW E&M-EST. PATIENT-LVL IV: CPT | Mod: PBBFAC,,, | Performed by: SURGERY

## 2020-08-31 PROCEDURE — 99024 POSTOP FOLLOW-UP VISIT: CPT | Mod: S$GLB,,, | Performed by: SURGERY

## 2020-08-31 NOTE — PROGRESS NOTES
History & Physical    SUBJECTIVE:     History of Present Illness:  Patient is a 64 y.o. male status post robotic left inguinal hernia repair 08/14/2020 presents for postop.  He has very mild soreness in his left groin but otherwise healing well.    Initially referred for left inguinal hernia.  Patient reports straining and noticing pain.  Since this time pain is resolved but he has noticed an asymmetry with his left groin versus his right groin.  He was seen by Dr. Reyna for his annual physical exam and noted to have a left inguinal hernia.    Chief Complaint   Patient presents with    Follow-up     post-op       Review of patient's allergies indicates:  No Known Allergies    Current Outpatient Medications   Medication Sig Dispense Refill    aspirin (ECOTRIN) 81 MG EC tablet Take 81 mg by mouth once daily.      azelastine (ASTELIN) 137 mcg (0.1 %) nasal spray 1 spray by Nasal route 2 (two) times daily.       EPINEPHrine (EPIPEN) 0.3 mg/0.3 mL AtIn Auvi-Q 0.3 mg/0.3 mL injection, auto-injector   Take 1 auto as needed by injection route as directed.      fexofenadine (ALLEGRA) 60 MG tablet Take 60 mg by mouth once daily.      fluticasone propionate (FLONASE) 50 mcg/actuation nasal spray 2 sprays by Each Nostril route once daily.      hydroCHLOROthiazide (HYDRODIURIL) 25 MG tablet TAKE 1 TABLET BY MOUTH ONCE DAILY (Patient taking differently: Take 25 mg by mouth once daily. ) 90 tablet 3    ibuprofen (ADVIL,MOTRIN) 800 MG tablet Take 1 tablet (800 mg total) by mouth 3 (three) times daily. 30 tablet 0    Lactobacillus acidophilus (PROBIOTIC) 10 billion cell Cap Take 1 tablet by mouth once daily.       pravastatin (PRAVACHOL) 40 MG tablet TAKE 1 TABLET BY MOUTH ONCE DAILY 90 tablet 1    tamsulosin (FLOMAX) 0.4 mg Cap TAKE 1 CAPSULE(0.4 MG) BY MOUTH EVERY DAY 30 capsule 11    vitamin D (VITAMIN D3) 1000 units Tab Take 1,000 Units by mouth once daily.       No current facility-administered medications for this  visit.        Past Medical History:   Diagnosis Date    Allergy     Benign hematuria     s/p urol/cysto    BPH (benign prostatic hyperplasia)     History of colon polyps 1/25/2018    He has had colon polyps since he was in his 30's.  He has had multiple colonoscopies and has had adenomatous polyps.    Hyperlipidemia     Hypertension     Inguinal hernia     Obesity     Squamous cell carcinoma skin of arm      r arm;s/p mohs     Past Surgical History:   Procedure Laterality Date    CLOSURE OF DEFECT OF MOHS PROCEDURE  07/10/2020    dr reynoso    COLONOSCOPY N/A 1/25/2018    Procedure: COLONOSCOPY;  Surgeon: Juan A Walter MD;  [facility];  Service: Endoscopy;  Laterality: N/A;    KNEE ARTHROSCOPY Left     ORIF TIBIA & FIBULA FRACTURES Left 08/10/2019    tib/fib fx    ROBOT-ASSISTED LAPAROSCOPIC REPAIR OF INGUINAL HERNIA USING DA ALEX XI Left 8/14/2020    Procedure: XI ROBOTIC REPAIR, HERNIA, INGUINAL;  Surgeon: Mark Anthony Araujo MD;  [facility];  Service: General;  Laterality: Left;     Family History   Problem Relation Age of Onset    Macular degeneration Maternal Uncle     Bladder Cancer Brother         smoker    Cataracts Mother     Fanconi anemia Mother     Leukemia Father 80    Coronary artery disease Father     Heart attack Father 72    Multiple myeloma Sister 59     Social History     Tobacco Use    Smoking status: Never Smoker    Smokeless tobacco: Never Used   Substance Use Topics    Alcohol use: No     Frequency: Never     Binge frequency: Never     Comment: quit 1983    Drug use: Never        Review of Systems:  Review of Systems   Constitutional: Negative for activity change, chills, fever and unexpected weight change.   HENT: Negative for congestion, hearing loss, rhinorrhea and trouble swallowing.    Eyes: Negative for discharge and visual disturbance.   Respiratory: Negative for chest tightness, shortness of breath and wheezing.    Cardiovascular: Negative for  "chest pain and palpitations.   Gastrointestinal: Negative for abdominal distention, abdominal pain, blood in stool, constipation, diarrhea, nausea, rectal pain and vomiting.   Endocrine: Negative for polydipsia and polyuria.   Genitourinary: Negative for difficulty urinating, dysuria, hematuria and urgency.   Musculoskeletal: Negative for arthralgias, joint swelling and neck pain.   Skin: Negative for rash.   Neurological: Negative for weakness, light-headedness and headaches.   Hematological: Negative for adenopathy.   Psychiatric/Behavioral: Negative for confusion and dysphoric mood.       OBJECTIVE:     Vital Signs (Most Recent)  Temp: 98.4 °F (36.9 °C) (08/31/20 0855)  Pulse: 68 (08/31/20 0855)  BP: 125/80 (08/31/20 0855)  5' 10" (1.778 m)  93.8 kg (206 lb 12.7 oz)     Physical Exam:  Physical Exam  Vitals signs reviewed.   Constitutional:       Appearance: He is well-developed.   HENT:      Head: Normocephalic and atraumatic.   Neck:      Musculoskeletal: Normal range of motion and neck supple.   Cardiovascular:      Rate and Rhythm: Normal rate and regular rhythm.   Pulmonary:      Effort: Pulmonary effort is normal.      Breath sounds: Normal breath sounds.   Abdominal:      General: Bowel sounds are normal. There is no distension.      Palpations: Abdomen is soft.      Tenderness: There is no abdominal tenderness.      Hernia: No hernia is present.      Comments: Incisions healing well no signs of infection   Skin:     General: Skin is warm and dry.   Neurological:      Mental Status: He is alert and oriented to person, place, and time.         ASSESSMENT/PLAN:     64-year-old male with status post robotic left inguinal hernia repair    PLAN:Plan     Healing well  Reassurance discomfort consistent with neuropathic pain discuss most likely will heal with time.  He will message if no improvement after 1-2 months and can consider medication therapy  Continue light duty x1 month     "

## 2020-10-21 ENCOUNTER — PATIENT MESSAGE (OUTPATIENT)
Dept: RESEARCH | Facility: HOSPITAL | Age: 65
End: 2020-10-21

## 2020-11-12 ENCOUNTER — PATIENT OUTREACH (OUTPATIENT)
Dept: ADMINISTRATIVE | Facility: OTHER | Age: 65
End: 2020-11-12

## 2020-11-12 ENCOUNTER — OFFICE VISIT (OUTPATIENT)
Dept: OPHTHALMOLOGY | Facility: CLINIC | Age: 65
End: 2020-11-12
Payer: COMMERCIAL

## 2020-11-12 DIAGNOSIS — H52.203 MYOPIA WITH ASTIGMATISM AND PRESBYOPIA, BILATERAL: ICD-10-CM

## 2020-11-12 DIAGNOSIS — H52.13 MYOPIA WITH ASTIGMATISM AND PRESBYOPIA, BILATERAL: ICD-10-CM

## 2020-11-12 DIAGNOSIS — H52.4 MYOPIA WITH ASTIGMATISM AND PRESBYOPIA, BILATERAL: ICD-10-CM

## 2020-11-12 DIAGNOSIS — H53.8 BLURRED VISION, BILATERAL: Primary | ICD-10-CM

## 2020-11-12 PROCEDURE — 92015 DETERMINE REFRACTIVE STATE: CPT | Mod: S$GLB,,, | Performed by: OPTOMETRIST

## 2020-11-12 PROCEDURE — 92014 PR EYE EXAM, EST PATIENT,COMPREHESV: ICD-10-PCS | Mod: S$GLB,,, | Performed by: OPTOMETRIST

## 2020-11-12 PROCEDURE — 99999 PR PBB SHADOW E&M-EST. PATIENT-LVL II: CPT | Mod: PBBFAC,,, | Performed by: OPTOMETRIST

## 2020-11-12 PROCEDURE — 99999 PR PBB SHADOW E&M-EST. PATIENT-LVL II: ICD-10-PCS | Mod: PBBFAC,,, | Performed by: OPTOMETRIST

## 2020-11-12 PROCEDURE — 92015 PR REFRACTION: ICD-10-PCS | Mod: S$GLB,,, | Performed by: OPTOMETRIST

## 2020-11-12 PROCEDURE — 92014 COMPRE OPH EXAM EST PT 1/>: CPT | Mod: S$GLB,,, | Performed by: OPTOMETRIST

## 2020-11-12 NOTE — PROGRESS NOTES
Called spoke with patients daughter Shawna, states pt has been having high BP and previously had a stroke. Advised her he should go to the ER. Patient refused ER but will come for an appointment. Advised daughter that he will probably be sent down to the ER from here if hes having stroke symptoms. Daughter states her understanding.   HPI     Blurred Vision     Comments: Yearly              Comments     [place] [facility]  Hx of Posterior vitreous detachment, OD  HPI    Any vision changes since last exam: Yes, decrease with overall vision but   more so distance vision.  Eye pain: No  Other ocular symptoms: No    Do you wear currently wear glasses or contacts? Glasses    Interested in contacts today? No    Do you plan on getting new glasses today? Yes              Last edited by Caridad Carrero on 11/12/2020  2:53 PM. (History)            Assessment /Plan     For exam results, see Encounter Report.    Blurred vision, bilateral    Myopia with astigmatism and presbyopia, bilateral      Eyeglass Final Rx     Eyeglass Final Rx       Sphere Cylinder Axis Dist VA Add    Right -2.50 +3.25 024 20/20 +2.50    Left -2.00 +3.25 160 20/20 +2.50    Expiration Date: 11/13/2021                RTC 1 yr for dilated eye exam or PRN if any problems.   Discussed above and answered questions.

## 2020-11-12 NOTE — PROGRESS NOTES
Health Maintenance Due   Topic Date Due    Shingles Vaccine (3 of 3) 12/16/2019    Influenza Vaccine (1) 08/01/2020     Updates were requested from care everywhere.  Chart was reviewed for overdue Proactive [facility] Encounters (TEJAS) topics (CRS, Breast Cancer Screening, Eye exam)  Health Maintenance has been updated.  LINKS immunization registry triggered.  LINKS not responding.

## 2021-01-25 ENCOUNTER — PATIENT MESSAGE (OUTPATIENT)
Dept: ADMINISTRATIVE | Facility: CLINIC | Age: 66
End: 2021-01-25

## 2021-02-09 ENCOUNTER — OFFICE VISIT (OUTPATIENT)
Dept: DERMATOLOGY | Facility: CLINIC | Age: 66
End: 2021-02-09
Payer: COMMERCIAL

## 2021-02-09 DIAGNOSIS — Z12.83 SCREENING, MALIGNANT NEOPLASM, SKIN: ICD-10-CM

## 2021-02-09 DIAGNOSIS — Z85.828 HISTORY OF SKIN CANCER: ICD-10-CM

## 2021-02-09 DIAGNOSIS — L72.0 MILIA: ICD-10-CM

## 2021-02-09 DIAGNOSIS — L90.5 SCAR CONDITIONS/SKIN FIBROSIS: Primary | ICD-10-CM

## 2021-02-09 PROCEDURE — 99213 OFFICE O/P EST LOW 20 MIN: CPT | Mod: S$GLB,,, | Performed by: DERMATOLOGY

## 2021-02-09 PROCEDURE — 99999 PR PBB SHADOW E&M-EST. PATIENT-LVL III: CPT | Mod: PBBFAC,,, | Performed by: DERMATOLOGY

## 2021-02-09 PROCEDURE — 99213 PR OFFICE/OUTPT VISIT, EST, LEVL III, 20-29 MIN: ICD-10-PCS | Mod: S$GLB,,, | Performed by: DERMATOLOGY

## 2021-02-09 PROCEDURE — 1126F AMNT PAIN NOTED NONE PRSNT: CPT | Mod: S$GLB,,, | Performed by: DERMATOLOGY

## 2021-02-09 PROCEDURE — 99999 PR PBB SHADOW E&M-EST. PATIENT-LVL III: ICD-10-PCS | Mod: PBBFAC,,, | Performed by: DERMATOLOGY

## 2021-02-09 PROCEDURE — 1126F PR PAIN SEVERITY QUANTIFIED, NO PAIN PRESENT: ICD-10-PCS | Mod: S$GLB,,, | Performed by: DERMATOLOGY

## 2021-02-09 RX ORDER — CIPROFLOXACIN AND DEXAMETHASONE 3; 1 MG/ML; MG/ML
SUSPENSION/ DROPS AURICULAR (OTIC)
COMMUNITY
Start: 2020-12-30

## 2021-02-18 ENCOUNTER — PATIENT MESSAGE (OUTPATIENT)
Dept: INTERNAL MEDICINE | Facility: CLINIC | Age: 66
End: 2021-02-18

## 2021-06-18 ENCOUNTER — OFFICE VISIT (OUTPATIENT)
Dept: DERMATOLOGY | Facility: CLINIC | Age: 66
End: 2021-06-18
Payer: COMMERCIAL

## 2021-06-18 ENCOUNTER — LAB VISIT (OUTPATIENT)
Dept: LAB | Facility: HOSPITAL | Age: 66
End: 2021-06-18
Attending: FAMILY MEDICINE
Payer: COMMERCIAL

## 2021-06-18 DIAGNOSIS — D18.01 HEMANGIOMA OF SKIN: ICD-10-CM

## 2021-06-18 DIAGNOSIS — Z12.5 SCREENING FOR PROSTATE CANCER: ICD-10-CM

## 2021-06-18 DIAGNOSIS — Z85.828 HISTORY OF SKIN CANCER: ICD-10-CM

## 2021-06-18 DIAGNOSIS — L90.5 SCAR CONDITIONS/SKIN FIBROSIS: Primary | ICD-10-CM

## 2021-06-18 DIAGNOSIS — D48.5 NEOPLASM OF UNCERTAIN BEHAVIOR OF SKIN: ICD-10-CM

## 2021-06-18 DIAGNOSIS — Z12.83 SCREENING, MALIGNANT NEOPLASM, SKIN: ICD-10-CM

## 2021-06-18 DIAGNOSIS — L82.1 SEBORRHEIC KERATOSIS: ICD-10-CM

## 2021-06-18 DIAGNOSIS — I10 ESSENTIAL HYPERTENSION: ICD-10-CM

## 2021-06-18 LAB
ALBUMIN SERPL BCP-MCNC: 3.9 G/DL (ref 3.5–5.2)
ALP SERPL-CCNC: 62 U/L (ref 55–135)
ALT SERPL W/O P-5'-P-CCNC: 22 U/L (ref 10–44)
ANION GAP SERPL CALC-SCNC: 8 MMOL/L (ref 8–16)
AST SERPL-CCNC: 19 U/L (ref 10–40)
BILIRUB SERPL-MCNC: 0.8 MG/DL (ref 0.1–1)
BUN SERPL-MCNC: 21 MG/DL (ref 8–23)
CALCIUM SERPL-MCNC: 9.5 MG/DL (ref 8.7–10.5)
CHLORIDE SERPL-SCNC: 103 MMOL/L (ref 95–110)
CHOLEST SERPL-MCNC: 155 MG/DL (ref 120–199)
CHOLEST/HDLC SERPL: 4 {RATIO} (ref 2–5)
CO2 SERPL-SCNC: 29 MMOL/L (ref 23–29)
COMPLEXED PSA SERPL-MCNC: 5.5 NG/ML (ref 0–4)
CREAT SERPL-MCNC: 0.9 MG/DL (ref 0.5–1.4)
EST. GFR  (AFRICAN AMERICAN): >60 ML/MIN/1.73 M^2
EST. GFR  (NON AFRICAN AMERICAN): >60 ML/MIN/1.73 M^2
GLUCOSE SERPL-MCNC: 89 MG/DL (ref 70–110)
HDLC SERPL-MCNC: 39 MG/DL (ref 40–75)
HDLC SERPL: 25.2 % (ref 20–50)
LDLC SERPL CALC-MCNC: 103.4 MG/DL (ref 63–159)
NONHDLC SERPL-MCNC: 116 MG/DL
POTASSIUM SERPL-SCNC: 4 MMOL/L (ref 3.5–5.1)
PROT SERPL-MCNC: 7.4 G/DL (ref 6–8.4)
SODIUM SERPL-SCNC: 140 MMOL/L (ref 136–145)
TRIGL SERPL-MCNC: 63 MG/DL (ref 30–150)

## 2021-06-18 PROCEDURE — 1126F AMNT PAIN NOTED NONE PRSNT: CPT | Mod: S$GLB,,, | Performed by: DERMATOLOGY

## 2021-06-18 PROCEDURE — 88305 TISSUE EXAM BY PATHOLOGIST: ICD-10-PCS | Mod: 26,,, | Performed by: PATHOLOGY

## 2021-06-18 PROCEDURE — 99999 PR PBB SHADOW E&M-EST. PATIENT-LVL III: ICD-10-PCS | Mod: PBBFAC,,, | Performed by: DERMATOLOGY

## 2021-06-18 PROCEDURE — 99213 PR OFFICE/OUTPT VISIT, EST, LEVL III, 20-29 MIN: ICD-10-PCS | Mod: 25,S$GLB,, | Performed by: DERMATOLOGY

## 2021-06-18 PROCEDURE — 11102 PR TANGENTIAL BIOPSY, SKIN, SINGLE LESION: ICD-10-PCS | Mod: S$GLB,,, | Performed by: DERMATOLOGY

## 2021-06-18 PROCEDURE — 80061 LIPID PANEL: CPT | Performed by: FAMILY MEDICINE

## 2021-06-18 PROCEDURE — 99999 PR PBB SHADOW E&M-EST. PATIENT-LVL III: CPT | Mod: PBBFAC,,, | Performed by: DERMATOLOGY

## 2021-06-18 PROCEDURE — 88305 TISSUE EXAM BY PATHOLOGIST: CPT | Performed by: PATHOLOGY

## 2021-06-18 PROCEDURE — 88305 TISSUE EXAM BY PATHOLOGIST: CPT | Mod: 26,,, | Performed by: PATHOLOGY

## 2021-06-18 PROCEDURE — 36415 COLL VENOUS BLD VENIPUNCTURE: CPT | Performed by: FAMILY MEDICINE

## 2021-06-18 PROCEDURE — 99213 OFFICE O/P EST LOW 20 MIN: CPT | Mod: 25,S$GLB,, | Performed by: DERMATOLOGY

## 2021-06-18 PROCEDURE — 11102 TANGNTL BX SKIN SINGLE LES: CPT | Mod: S$GLB,,, | Performed by: DERMATOLOGY

## 2021-06-18 PROCEDURE — 84153 ASSAY OF PSA TOTAL: CPT | Performed by: FAMILY MEDICINE

## 2021-06-18 PROCEDURE — 80053 COMPREHEN METABOLIC PANEL: CPT | Performed by: FAMILY MEDICINE

## 2021-06-18 PROCEDURE — 1126F PR PAIN SEVERITY QUANTIFIED, NO PAIN PRESENT: ICD-10-PCS | Mod: S$GLB,,, | Performed by: DERMATOLOGY

## 2021-06-24 LAB
FINAL PATHOLOGIC DIAGNOSIS: NORMAL
GROSS: NORMAL
Lab: NORMAL
MICROSCOPIC EXAM: NORMAL

## 2021-06-28 ENCOUNTER — OFFICE VISIT (OUTPATIENT)
Dept: INTERNAL MEDICINE | Facility: CLINIC | Age: 66
End: 2021-06-28
Payer: COMMERCIAL

## 2021-06-28 VITALS
DIASTOLIC BLOOD PRESSURE: 86 MMHG | OXYGEN SATURATION: 97 % | SYSTOLIC BLOOD PRESSURE: 124 MMHG | TEMPERATURE: 98 F | HEART RATE: 67 BPM | HEIGHT: 70 IN | WEIGHT: 215.38 LBS | BODY MASS INDEX: 30.83 KG/M2

## 2021-06-28 DIAGNOSIS — Z00.00 ROUTINE HEALTH MAINTENANCE: Primary | ICD-10-CM

## 2021-06-28 DIAGNOSIS — I10 ESSENTIAL HYPERTENSION: ICD-10-CM

## 2021-06-28 DIAGNOSIS — R97.20 ELEVATED PSA: ICD-10-CM

## 2021-06-28 DIAGNOSIS — N28.1 RENAL CYST: ICD-10-CM

## 2021-06-28 PROCEDURE — 1126F AMNT PAIN NOTED NONE PRSNT: CPT | Mod: S$GLB,,, | Performed by: FAMILY MEDICINE

## 2021-06-28 PROCEDURE — 3008F PR BODY MASS INDEX (BMI) DOCUMENTED: ICD-10-PCS | Mod: CPTII,S$GLB,, | Performed by: FAMILY MEDICINE

## 2021-06-28 PROCEDURE — 99999 PR PBB SHADOW E&M-EST. PATIENT-LVL IV: ICD-10-PCS | Mod: PBBFAC,,, | Performed by: FAMILY MEDICINE

## 2021-06-28 PROCEDURE — 3008F BODY MASS INDEX DOCD: CPT | Mod: CPTII,S$GLB,, | Performed by: FAMILY MEDICINE

## 2021-06-28 PROCEDURE — 99396 PREV VISIT EST AGE 40-64: CPT | Mod: S$GLB,,, | Performed by: FAMILY MEDICINE

## 2021-06-28 PROCEDURE — 1126F PR PAIN SEVERITY QUANTIFIED, NO PAIN PRESENT: ICD-10-PCS | Mod: S$GLB,,, | Performed by: FAMILY MEDICINE

## 2021-06-28 PROCEDURE — 99396 PR PREVENTIVE VISIT,EST,40-64: ICD-10-PCS | Mod: S$GLB,,, | Performed by: FAMILY MEDICINE

## 2021-06-28 PROCEDURE — 99999 PR PBB SHADOW E&M-EST. PATIENT-LVL IV: CPT | Mod: PBBFAC,,, | Performed by: FAMILY MEDICINE

## 2021-06-28 RX ORDER — HYDROCHLOROTHIAZIDE 25 MG/1
25 TABLET ORAL DAILY
Qty: 90 TABLET | Refills: 4 | Status: SHIPPED | OUTPATIENT
Start: 2021-06-28 | End: 2021-11-11

## 2021-07-07 ENCOUNTER — TELEPHONE (OUTPATIENT)
Dept: RADIOLOGY | Facility: HOSPITAL | Age: 66
End: 2021-07-07

## 2021-07-08 ENCOUNTER — HOSPITAL ENCOUNTER (OUTPATIENT)
Dept: RADIOLOGY | Facility: HOSPITAL | Age: 66
Discharge: HOME | End: 2021-07-08
Attending: UROLOGY
Payer: COMMERCIAL

## 2021-07-08 DIAGNOSIS — I10 HYPERTENSION, UNSPECIFIED TYPE: ICD-10-CM

## 2021-07-08 DIAGNOSIS — N28.1 RENAL CYST: ICD-10-CM

## 2021-07-08 DIAGNOSIS — R31.9 HEMATURIA, UNSPECIFIED TYPE: ICD-10-CM

## 2021-07-08 DIAGNOSIS — N40.0 BENIGN PROSTATIC HYPERPLASIA, UNSPECIFIED WHETHER LOWER URINARY TRACT SYMPTOMS PRESENT: ICD-10-CM

## 2021-07-08 PROCEDURE — 76770 US EXAM ABDO BACK WALL COMP: CPT | Mod: TC

## 2021-07-08 PROCEDURE — 76770 US RETROPERITONEAL COMPLETE: ICD-10-PCS | Mod: 26,,, | Performed by: RADIOLOGY

## 2021-07-08 PROCEDURE — 76770 US EXAM ABDO BACK WALL COMP: CPT | Mod: 26,,, | Performed by: RADIOLOGY

## 2021-07-12 ENCOUNTER — OFFICE VISIT (OUTPATIENT)
Dept: UROLOGY | Facility: CLINIC | Age: 66
End: 2021-07-12
Payer: COMMERCIAL

## 2021-07-12 ENCOUNTER — LAB VISIT (OUTPATIENT)
Dept: LAB | Facility: HOSPITAL | Age: 66
End: 2021-07-12
Attending: UROLOGY
Payer: COMMERCIAL

## 2021-07-12 ENCOUNTER — PATIENT MESSAGE (OUTPATIENT)
Dept: UROLOGY | Facility: CLINIC | Age: 66
End: 2021-07-12

## 2021-07-12 VITALS
WEIGHT: 214.06 LBS | HEIGHT: 70 IN | SYSTOLIC BLOOD PRESSURE: 142 MMHG | HEART RATE: 73 BPM | BODY MASS INDEX: 30.65 KG/M2 | DIASTOLIC BLOOD PRESSURE: 87 MMHG

## 2021-07-12 DIAGNOSIS — R31.9 HEMATURIA, UNSPECIFIED TYPE: Primary | ICD-10-CM

## 2021-07-12 DIAGNOSIS — N28.1 RENAL CYST: ICD-10-CM

## 2021-07-12 DIAGNOSIS — N40.0 BENIGN PROSTATIC HYPERPLASIA, UNSPECIFIED WHETHER LOWER URINARY TRACT SYMPTOMS PRESENT: ICD-10-CM

## 2021-07-12 DIAGNOSIS — R97.20 ELEVATED PSA: ICD-10-CM

## 2021-07-12 LAB
BILIRUB SERPL-MCNC: NORMAL MG/DL
BLOOD URINE, POC: 250
CLARITY, POC UA: NORMAL
COLOR, POC UA: YELLOW
GLUCOSE UR QL STRIP: NORMAL
KETONES UR QL STRIP: NORMAL
LEUKOCYTE ESTERASE URINE, POC: NORMAL
NITRITE, POC UA: NORMAL
PH, POC UA: 6
PROTEIN, POC: NORMAL
SPECIFIC GRAVITY, POC UA: 1.02
UROBILINOGEN, POC UA: NORMAL

## 2021-07-12 PROCEDURE — 99214 OFFICE O/P EST MOD 30 MIN: CPT | Mod: 25,S$GLB,, | Performed by: UROLOGY

## 2021-07-12 PROCEDURE — 1126F AMNT PAIN NOTED NONE PRSNT: CPT | Mod: S$GLB,,, | Performed by: UROLOGY

## 2021-07-12 PROCEDURE — 84153 ASSAY OF PSA TOTAL: CPT | Performed by: UROLOGY

## 2021-07-12 PROCEDURE — 36415 COLL VENOUS BLD VENIPUNCTURE: CPT | Performed by: UROLOGY

## 2021-07-12 PROCEDURE — 99999 PR PBB SHADOW E&M-EST. PATIENT-LVL IV: ICD-10-PCS | Mod: PBBFAC,,, | Performed by: UROLOGY

## 2021-07-12 PROCEDURE — 3008F BODY MASS INDEX DOCD: CPT | Mod: CPTII,S$GLB,, | Performed by: UROLOGY

## 2021-07-12 PROCEDURE — 81002 POCT URINE DIPSTICK WITHOUT MICROSCOPE: ICD-10-PCS | Mod: S$GLB,,, | Performed by: UROLOGY

## 2021-07-12 PROCEDURE — 3008F PR BODY MASS INDEX (BMI) DOCUMENTED: ICD-10-PCS | Mod: CPTII,S$GLB,, | Performed by: UROLOGY

## 2021-07-12 PROCEDURE — 1126F PR PAIN SEVERITY QUANTIFIED, NO PAIN PRESENT: ICD-10-PCS | Mod: S$GLB,,, | Performed by: UROLOGY

## 2021-07-12 PROCEDURE — 81002 URINALYSIS NONAUTO W/O SCOPE: CPT | Mod: S$GLB,,, | Performed by: UROLOGY

## 2021-07-12 PROCEDURE — 99999 PR PBB SHADOW E&M-EST. PATIENT-LVL IV: CPT | Mod: PBBFAC,,, | Performed by: UROLOGY

## 2021-07-12 PROCEDURE — 99214 PR OFFICE/OUTPT VISIT, EST, LEVL IV, 30-39 MIN: ICD-10-PCS | Mod: 25,S$GLB,, | Performed by: UROLOGY

## 2021-07-12 RX ORDER — DIAZEPAM 5 MG/1
5 TABLET ORAL ONCE
Qty: 1 TABLET | Refills: 0 | Status: SHIPPED | OUTPATIENT
Start: 2021-07-12 | End: 2022-04-07

## 2021-07-12 RX ORDER — OXYCODONE AND ACETAMINOPHEN 5; 325 MG/1; MG/1
1 TABLET ORAL EVERY 4 HOURS PRN
Qty: 10 TABLET | Refills: 0 | Status: ON HOLD | OUTPATIENT
Start: 2021-07-12 | End: 2021-09-20

## 2021-07-12 RX ORDER — SULFAMETHOXAZOLE AND TRIMETHOPRIM 800; 160 MG/1; MG/1
1 TABLET ORAL 2 TIMES DAILY
Qty: 10 TABLET | Refills: 0 | Status: ON HOLD | OUTPATIENT
Start: 2021-07-12 | End: 2021-09-20

## 2021-07-12 RX ORDER — FLUOCINOLONE ACETONIDE 0.11 MG/ML
OIL AURICULAR (OTIC)
Status: ON HOLD | COMMUNITY
Start: 2021-02-23 | End: 2023-07-12 | Stop reason: HOSPADM

## 2021-07-13 LAB — COMPLEXED PSA SERPL-MCNC: 5.7 NG/ML (ref 0–4)

## 2021-07-19 ENCOUNTER — PROCEDURE VISIT (OUTPATIENT)
Dept: UROLOGY | Facility: CLINIC | Age: 66
End: 2021-07-19
Payer: COMMERCIAL

## 2021-07-19 VITALS
BODY MASS INDEX: 30.46 KG/M2 | SYSTOLIC BLOOD PRESSURE: 122 MMHG | DIASTOLIC BLOOD PRESSURE: 78 MMHG | WEIGHT: 212.31 LBS

## 2021-07-19 DIAGNOSIS — N28.1 RENAL CYST: ICD-10-CM

## 2021-07-19 DIAGNOSIS — R31.9 HEMATURIA, UNSPECIFIED TYPE: ICD-10-CM

## 2021-07-19 DIAGNOSIS — N40.0 BENIGN PROSTATIC HYPERPLASIA, UNSPECIFIED WHETHER LOWER URINARY TRACT SYMPTOMS PRESENT: ICD-10-CM

## 2021-07-19 DIAGNOSIS — R97.20 ELEVATED PSA: Primary | ICD-10-CM

## 2021-07-19 LAB
BILIRUB SERPL-MCNC: ABNORMAL MG/DL
BLOOD URINE, POC: 250
CLARITY, POC UA: CLEAR
COLOR, POC UA: YELLOW
GLUCOSE UR QL STRIP: ABNORMAL
KETONES UR QL STRIP: ABNORMAL
LEUKOCYTE ESTERASE URINE, POC: ABNORMAL
NITRITE, POC UA: ABNORMAL
PH, POC UA: 6
PROTEIN, POC: ABNORMAL
SPECIFIC GRAVITY, POC UA: 1.01
UROBILINOGEN, POC UA: ABNORMAL

## 2021-07-19 PROCEDURE — 55700 PR BIOPSY OF PROSTATE,NEEDLE/PUNCH: ICD-10-PCS | Mod: S$GLB,,, | Performed by: UROLOGY

## 2021-07-19 PROCEDURE — 88305 TISSUE EXAM BY PATHOLOGIST: ICD-10-PCS | Mod: 26,,, | Performed by: PATHOLOGY

## 2021-07-19 PROCEDURE — 88305 TISSUE EXAM BY PATHOLOGIST: CPT | Performed by: PATHOLOGY

## 2021-07-19 PROCEDURE — 55700 PR BIOPSY OF PROSTATE,NEEDLE/PUNCH: CPT | Mod: S$GLB,,, | Performed by: UROLOGY

## 2021-07-19 PROCEDURE — 96372 PR INJECTION,THERAP/PROPH/DIAG2ST, IM OR SUBCUT: ICD-10-PCS | Mod: 59,S$GLB,, | Performed by: UROLOGY

## 2021-07-19 PROCEDURE — 88305 TISSUE EXAM BY PATHOLOGIST: CPT | Mod: 26,,, | Performed by: PATHOLOGY

## 2021-07-19 PROCEDURE — 81002 POCT URINE DIPSTICK WITHOUT MICROSCOPE: ICD-10-PCS | Mod: S$GLB,,, | Performed by: UROLOGY

## 2021-07-19 PROCEDURE — 76872 US TRANSRECTAL: CPT | Mod: 26,S$GLB,, | Performed by: UROLOGY

## 2021-07-19 PROCEDURE — 76872 PR US TRANSRECTAL: ICD-10-PCS | Mod: 26,S$GLB,, | Performed by: UROLOGY

## 2021-07-19 PROCEDURE — 96372 THER/PROPH/DIAG INJ SC/IM: CPT | Mod: 59,S$GLB,, | Performed by: UROLOGY

## 2021-07-19 PROCEDURE — 81002 URINALYSIS NONAUTO W/O SCOPE: CPT | Mod: S$GLB,,, | Performed by: UROLOGY

## 2021-07-19 RX ORDER — CEFTRIAXONE 1 G/1
1 INJECTION, POWDER, FOR SOLUTION INTRAMUSCULAR; INTRAVENOUS
Status: COMPLETED | OUTPATIENT
Start: 2021-07-19 | End: 2021-07-19

## 2021-07-19 RX ORDER — LIDOCAINE HYDROCHLORIDE 20 MG/ML
JELLY TOPICAL
Status: COMPLETED | OUTPATIENT
Start: 2021-07-19 | End: 2021-07-19

## 2021-07-19 RX ADMIN — LIDOCAINE HYDROCHLORIDE: 20 JELLY TOPICAL at 08:07

## 2021-07-19 RX ADMIN — CEFTRIAXONE 1 G: 1 INJECTION, POWDER, FOR SOLUTION INTRAMUSCULAR; INTRAVENOUS at 08:07

## 2021-07-22 DIAGNOSIS — C61 MALIGNANT NEOPLASM OF PROSTATE: ICD-10-CM

## 2021-07-22 DIAGNOSIS — C61 MALIGNANT NEOPLASM OF PROSTATE: Primary | ICD-10-CM

## 2021-07-22 LAB
FINAL PATHOLOGIC DIAGNOSIS: NORMAL
GROSS: NORMAL
Lab: NORMAL

## 2021-07-25 ENCOUNTER — PATIENT MESSAGE (OUTPATIENT)
Dept: UROLOGY | Facility: CLINIC | Age: 66
End: 2021-07-25

## 2021-07-28 ENCOUNTER — PATIENT MESSAGE (OUTPATIENT)
Dept: UROLOGY | Facility: CLINIC | Age: 66
End: 2021-07-28

## 2021-07-29 ENCOUNTER — TELEPHONE (OUTPATIENT)
Dept: RADIOLOGY | Facility: HOSPITAL | Age: 66
End: 2021-07-29

## 2021-07-30 ENCOUNTER — HOSPITAL ENCOUNTER (OUTPATIENT)
Dept: RADIOLOGY | Facility: HOSPITAL | Age: 66
Discharge: HOME | End: 2021-07-30
Attending: UROLOGY
Payer: COMMERCIAL

## 2021-07-30 ENCOUNTER — LAB VISIT (OUTPATIENT)
Dept: LAB | Facility: HOSPITAL | Age: 66
End: 2021-07-30
Attending: UROLOGY
Payer: COMMERCIAL

## 2021-07-30 DIAGNOSIS — C61 MALIGNANT NEOPLASM OF PROSTATE: ICD-10-CM

## 2021-07-30 LAB
CREAT SERPL-MCNC: 1 MG/DL (ref 0.5–1.4)
EST. GFR  (AFRICAN AMERICAN): >60 ML/MIN/1.73 M^2
EST. GFR  (NON AFRICAN AMERICAN): >60 ML/MIN/1.73 M^2

## 2021-07-30 PROCEDURE — 74177 CT ABD & PELVIS W/CONTRAST: CPT | Mod: TC

## 2021-07-30 PROCEDURE — A9698 NON-RAD CONTRAST MATERIALNOC: HCPCS | Performed by: UROLOGY

## 2021-07-30 PROCEDURE — 74177 CT ABDOMEN PELVIS WITH CONTRAST: ICD-10-PCS | Mod: 26,,, | Performed by: RADIOLOGY

## 2021-07-30 PROCEDURE — A9503 TC99M MEDRONATE: HCPCS

## 2021-07-30 PROCEDURE — 82565 ASSAY OF CREATININE: CPT | Performed by: UROLOGY

## 2021-07-30 PROCEDURE — 78306 BONE IMAGING WHOLE BODY: CPT | Mod: TC

## 2021-07-30 PROCEDURE — 78306 BONE IMAGING WHOLE BODY: CPT | Mod: 26,,, | Performed by: RADIOLOGY

## 2021-07-30 PROCEDURE — 36415 COLL VENOUS BLD VENIPUNCTURE: CPT | Performed by: UROLOGY

## 2021-07-30 PROCEDURE — 25500020 PHARM REV CODE 255: Performed by: UROLOGY

## 2021-07-30 PROCEDURE — 74177 CT ABD & PELVIS W/CONTRAST: CPT | Mod: 26,,, | Performed by: RADIOLOGY

## 2021-07-30 PROCEDURE — 78306 NM BONE SCAN WHOLE BODY: ICD-10-PCS | Mod: 26,,, | Performed by: RADIOLOGY

## 2021-07-30 RX ADMIN — IOHEXOL 100 ML: 350 INJECTION, SOLUTION INTRAVENOUS at 09:07

## 2021-07-30 RX ADMIN — IOHEXOL 1000 ML: 12 SOLUTION ORAL at 08:07

## 2021-08-02 ENCOUNTER — OFFICE VISIT (OUTPATIENT)
Dept: UROLOGY | Facility: CLINIC | Age: 66
End: 2021-08-02
Payer: COMMERCIAL

## 2021-08-02 ENCOUNTER — TELEPHONE (OUTPATIENT)
Dept: UROLOGY | Facility: CLINIC | Age: 66
End: 2021-08-02

## 2021-08-02 ENCOUNTER — PATIENT MESSAGE (OUTPATIENT)
Dept: UROLOGY | Facility: CLINIC | Age: 66
End: 2021-08-02

## 2021-08-02 VITALS
WEIGHT: 215.38 LBS | SYSTOLIC BLOOD PRESSURE: 134 MMHG | HEART RATE: 70 BPM | HEIGHT: 70 IN | DIASTOLIC BLOOD PRESSURE: 82 MMHG | BODY MASS INDEX: 30.83 KG/M2

## 2021-08-02 DIAGNOSIS — N40.0 BENIGN PROSTATIC HYPERPLASIA, UNSPECIFIED WHETHER LOWER URINARY TRACT SYMPTOMS PRESENT: ICD-10-CM

## 2021-08-02 DIAGNOSIS — C61 MALIGNANT NEOPLASM OF PROSTATE: Primary | ICD-10-CM

## 2021-08-02 DIAGNOSIS — N28.1 RENAL CYST: ICD-10-CM

## 2021-08-02 PROCEDURE — 99214 PR OFFICE/OUTPT VISIT, EST, LEVL IV, 30-39 MIN: ICD-10-PCS | Mod: S$GLB,,, | Performed by: UROLOGY

## 2021-08-02 PROCEDURE — 3079F PR MOST RECENT DIASTOLIC BLOOD PRESSURE 80-89 MM HG: ICD-10-PCS | Mod: CPTII,S$GLB,, | Performed by: UROLOGY

## 2021-08-02 PROCEDURE — 1159F MED LIST DOCD IN RCRD: CPT | Mod: CPTII,S$GLB,, | Performed by: UROLOGY

## 2021-08-02 PROCEDURE — 3075F SYST BP GE 130 - 139MM HG: CPT | Mod: CPTII,S$GLB,, | Performed by: UROLOGY

## 2021-08-02 PROCEDURE — 3008F BODY MASS INDEX DOCD: CPT | Mod: CPTII,S$GLB,, | Performed by: UROLOGY

## 2021-08-02 PROCEDURE — 1159F PR MEDICATION LIST DOCUMENTED IN MEDICAL RECORD: ICD-10-PCS | Mod: CPTII,S$GLB,, | Performed by: UROLOGY

## 2021-08-02 PROCEDURE — 1126F PR PAIN SEVERITY QUANTIFIED, NO PAIN PRESENT: ICD-10-PCS | Mod: CPTII,S$GLB,, | Performed by: UROLOGY

## 2021-08-02 PROCEDURE — 99999 PR PBB SHADOW E&M-EST. PATIENT-LVL IV: ICD-10-PCS | Mod: PBBFAC,,, | Performed by: UROLOGY

## 2021-08-02 PROCEDURE — 3075F PR MOST RECENT SYSTOLIC BLOOD PRESS GE 130-139MM HG: ICD-10-PCS | Mod: CPTII,S$GLB,, | Performed by: UROLOGY

## 2021-08-02 PROCEDURE — 3079F DIAST BP 80-89 MM HG: CPT | Mod: CPTII,S$GLB,, | Performed by: UROLOGY

## 2021-08-02 PROCEDURE — 99214 OFFICE O/P EST MOD 30 MIN: CPT | Mod: S$GLB,,, | Performed by: UROLOGY

## 2021-08-02 PROCEDURE — 3008F PR BODY MASS INDEX (BMI) DOCUMENTED: ICD-10-PCS | Mod: CPTII,S$GLB,, | Performed by: UROLOGY

## 2021-08-02 PROCEDURE — 99999 PR PBB SHADOW E&M-EST. PATIENT-LVL IV: CPT | Mod: PBBFAC,,, | Performed by: UROLOGY

## 2021-08-02 PROCEDURE — 1126F AMNT PAIN NOTED NONE PRSNT: CPT | Mod: CPTII,S$GLB,, | Performed by: UROLOGY

## 2021-08-05 ENCOUNTER — OFFICE VISIT (OUTPATIENT)
Dept: RADIATION ONCOLOGY | Facility: CLINIC | Age: 66
End: 2021-08-05
Payer: COMMERCIAL

## 2021-08-05 ENCOUNTER — PATIENT MESSAGE (OUTPATIENT)
Dept: RADIATION ONCOLOGY | Facility: CLINIC | Age: 66
End: 2021-08-05

## 2021-08-05 VITALS
HEART RATE: 74 BPM | OXYGEN SATURATION: 99 % | SYSTOLIC BLOOD PRESSURE: 136 MMHG | WEIGHT: 213.13 LBS | DIASTOLIC BLOOD PRESSURE: 88 MMHG | BODY MASS INDEX: 30.51 KG/M2 | TEMPERATURE: 97 F | HEIGHT: 70 IN | RESPIRATION RATE: 18 BRPM

## 2021-08-05 DIAGNOSIS — C61 MALIGNANT NEOPLASM OF PROSTATE: Primary | ICD-10-CM

## 2021-08-05 PROCEDURE — 99205 OFFICE O/P NEW HI 60 MIN: CPT | Mod: S$GLB,,, | Performed by: RADIOLOGY

## 2021-08-05 PROCEDURE — 3079F PR MOST RECENT DIASTOLIC BLOOD PRESSURE 80-89 MM HG: ICD-10-PCS | Mod: CPTII,S$GLB,, | Performed by: RADIOLOGY

## 2021-08-05 PROCEDURE — 99999 PR PBB SHADOW E&M-EST. PATIENT-LVL IV: CPT | Mod: PBBFAC,,, | Performed by: RADIOLOGY

## 2021-08-05 PROCEDURE — 99999 PR PBB SHADOW E&M-EST. PATIENT-LVL IV: ICD-10-PCS | Mod: PBBFAC,,, | Performed by: RADIOLOGY

## 2021-08-05 PROCEDURE — 3075F PR MOST RECENT SYSTOLIC BLOOD PRESS GE 130-139MM HG: ICD-10-PCS | Mod: CPTII,S$GLB,, | Performed by: RADIOLOGY

## 2021-08-05 PROCEDURE — 1159F PR MEDICATION LIST DOCUMENTED IN MEDICAL RECORD: ICD-10-PCS | Mod: CPTII,S$GLB,, | Performed by: RADIOLOGY

## 2021-08-05 PROCEDURE — 3079F DIAST BP 80-89 MM HG: CPT | Mod: CPTII,S$GLB,, | Performed by: RADIOLOGY

## 2021-08-05 PROCEDURE — 3008F BODY MASS INDEX DOCD: CPT | Mod: CPTII,S$GLB,, | Performed by: RADIOLOGY

## 2021-08-05 PROCEDURE — 1126F PR PAIN SEVERITY QUANTIFIED, NO PAIN PRESENT: ICD-10-PCS | Mod: CPTII,S$GLB,, | Performed by: RADIOLOGY

## 2021-08-05 PROCEDURE — 3075F SYST BP GE 130 - 139MM HG: CPT | Mod: CPTII,S$GLB,, | Performed by: RADIOLOGY

## 2021-08-05 PROCEDURE — 1159F MED LIST DOCD IN RCRD: CPT | Mod: CPTII,S$GLB,, | Performed by: RADIOLOGY

## 2021-08-05 PROCEDURE — 3008F PR BODY MASS INDEX (BMI) DOCUMENTED: ICD-10-PCS | Mod: CPTII,S$GLB,, | Performed by: RADIOLOGY

## 2021-08-05 PROCEDURE — 99205 PR OFFICE/OUTPT VISIT, NEW, LEVL V, 60-74 MIN: ICD-10-PCS | Mod: S$GLB,,, | Performed by: RADIOLOGY

## 2021-08-05 PROCEDURE — 1126F AMNT PAIN NOTED NONE PRSNT: CPT | Mod: CPTII,S$GLB,, | Performed by: RADIOLOGY

## 2021-08-08 ENCOUNTER — PATIENT MESSAGE (OUTPATIENT)
Dept: UROLOGY | Facility: CLINIC | Age: 66
End: 2021-08-08

## 2021-08-08 ENCOUNTER — PATIENT MESSAGE (OUTPATIENT)
Dept: RADIATION ONCOLOGY | Facility: CLINIC | Age: 66
End: 2021-08-08

## 2021-08-09 ENCOUNTER — PATIENT OUTREACH (OUTPATIENT)
Dept: ADMINISTRATIVE | Facility: OTHER | Age: 66
End: 2021-08-09

## 2021-08-09 ENCOUNTER — TELEPHONE (OUTPATIENT)
Dept: UROLOGY | Facility: CLINIC | Age: 66
End: 2021-08-09

## 2021-08-09 ENCOUNTER — OFFICE VISIT (OUTPATIENT)
Dept: GASTROENTEROLOGY | Facility: CLINIC | Age: 66
End: 2021-08-09
Payer: COMMERCIAL

## 2021-08-09 DIAGNOSIS — C61 MALIGNANT NEOPLASM OF PROSTATE: ICD-10-CM

## 2021-08-09 DIAGNOSIS — D49.0 IPMN (INTRADUCTAL PAPILLARY MUCINOUS NEOPLASM): Primary | ICD-10-CM

## 2021-08-09 PROCEDURE — 99203 OFFICE O/P NEW LOW 30 MIN: CPT | Mod: 95,,, | Performed by: NURSE PRACTITIONER

## 2021-08-09 PROCEDURE — 1160F PR REVIEW ALL MEDS BY PRESCRIBER/CLIN PHARMACIST DOCUMENTED: ICD-10-PCS | Mod: CPTII,,, | Performed by: NURSE PRACTITIONER

## 2021-08-09 PROCEDURE — 1159F MED LIST DOCD IN RCRD: CPT | Mod: CPTII,,, | Performed by: NURSE PRACTITIONER

## 2021-08-09 PROCEDURE — 1160F RVW MEDS BY RX/DR IN RCRD: CPT | Mod: CPTII,,, | Performed by: NURSE PRACTITIONER

## 2021-08-09 PROCEDURE — 99203 PR OFFICE/OUTPT VISIT, NEW, LEVL III, 30-44 MIN: ICD-10-PCS | Mod: 95,,, | Performed by: NURSE PRACTITIONER

## 2021-08-09 PROCEDURE — 1159F PR MEDICATION LIST DOCUMENTED IN MEDICAL RECORD: ICD-10-PCS | Mod: CPTII,,, | Performed by: NURSE PRACTITIONER

## 2021-08-20 ENCOUNTER — OFFICE VISIT (OUTPATIENT)
Dept: UROLOGY | Facility: CLINIC | Age: 66
End: 2021-08-20
Payer: COMMERCIAL

## 2021-08-20 VITALS — WEIGHT: 213.88 LBS | BODY MASS INDEX: 30.68 KG/M2

## 2021-08-20 DIAGNOSIS — C61 MALIGNANT NEOPLASM OF PROSTATE: Primary | ICD-10-CM

## 2021-08-20 PROCEDURE — 99999 PR PBB SHADOW E&M-EST. PATIENT-LVL III: ICD-10-PCS | Mod: PBBFAC,,, | Performed by: UROLOGY

## 2021-08-20 PROCEDURE — 1126F PR PAIN SEVERITY QUANTIFIED, NO PAIN PRESENT: ICD-10-PCS | Mod: CPTII,S$GLB,, | Performed by: UROLOGY

## 2021-08-20 PROCEDURE — 1160F PR REVIEW ALL MEDS BY PRESCRIBER/CLIN PHARMACIST DOCUMENTED: ICD-10-PCS | Mod: CPTII,S$GLB,, | Performed by: UROLOGY

## 2021-08-20 PROCEDURE — 3008F BODY MASS INDEX DOCD: CPT | Mod: CPTII,S$GLB,, | Performed by: UROLOGY

## 2021-08-20 PROCEDURE — 99999 PR PBB SHADOW E&M-EST. PATIENT-LVL III: CPT | Mod: PBBFAC,,, | Performed by: UROLOGY

## 2021-08-20 PROCEDURE — 1159F MED LIST DOCD IN RCRD: CPT | Mod: CPTII,S$GLB,, | Performed by: UROLOGY

## 2021-08-20 PROCEDURE — 3008F PR BODY MASS INDEX (BMI) DOCUMENTED: ICD-10-PCS | Mod: CPTII,S$GLB,, | Performed by: UROLOGY

## 2021-08-20 PROCEDURE — 99214 OFFICE O/P EST MOD 30 MIN: CPT | Mod: S$GLB,,, | Performed by: UROLOGY

## 2021-08-20 PROCEDURE — 1159F PR MEDICATION LIST DOCUMENTED IN MEDICAL RECORD: ICD-10-PCS | Mod: CPTII,S$GLB,, | Performed by: UROLOGY

## 2021-08-20 PROCEDURE — 99214 PR OFFICE/OUTPT VISIT, EST, LEVL IV, 30-39 MIN: ICD-10-PCS | Mod: S$GLB,,, | Performed by: UROLOGY

## 2021-08-20 PROCEDURE — 1126F AMNT PAIN NOTED NONE PRSNT: CPT | Mod: CPTII,S$GLB,, | Performed by: UROLOGY

## 2021-08-20 PROCEDURE — 1160F RVW MEDS BY RX/DR IN RCRD: CPT | Mod: CPTII,S$GLB,, | Performed by: UROLOGY

## 2021-08-23 ENCOUNTER — PATIENT MESSAGE (OUTPATIENT)
Dept: UROLOGY | Facility: CLINIC | Age: 66
End: 2021-08-23

## 2021-08-25 ENCOUNTER — PATIENT MESSAGE (OUTPATIENT)
Dept: UROLOGY | Facility: CLINIC | Age: 66
End: 2021-08-25

## 2021-08-25 ENCOUNTER — TELEPHONE (OUTPATIENT)
Dept: UROLOGY | Facility: CLINIC | Age: 66
End: 2021-08-25

## 2021-08-25 DIAGNOSIS — C61 PROSTATE CANCER: Primary | ICD-10-CM

## 2021-09-08 ENCOUNTER — PATIENT MESSAGE (OUTPATIENT)
Dept: SURGERY | Facility: HOSPITAL | Age: 66
End: 2021-09-08

## 2021-09-13 ENCOUNTER — OFFICE VISIT (OUTPATIENT)
Dept: UROLOGY | Facility: CLINIC | Age: 66
End: 2021-09-13
Payer: MEDICARE

## 2021-09-13 DIAGNOSIS — C61 PROSTATE CANCER: ICD-10-CM

## 2021-09-13 PROCEDURE — 99499 UNLISTED E&M SERVICE: CPT | Mod: S$GLB,,, | Performed by: UROLOGY

## 2021-09-13 PROCEDURE — 99499 NO LOS: ICD-10-PCS | Mod: S$GLB,,, | Performed by: UROLOGY

## 2021-09-13 PROCEDURE — 99999 PR PBB SHADOW E&M-EST. PATIENT-LVL II: CPT | Mod: PBBFAC,,,

## 2021-09-13 PROCEDURE — 99999 PR PBB SHADOW E&M-EST. PATIENT-LVL II: ICD-10-PCS | Mod: PBBFAC,,,

## 2021-09-13 RX ORDER — ACETAMINOPHEN 500 MG
1000 TABLET ORAL
Status: CANCELLED | OUTPATIENT
Start: 2021-09-13

## 2021-09-13 RX ORDER — SODIUM CHLORIDE 9 MG/ML
INJECTION, SOLUTION INTRAVENOUS CONTINUOUS
Status: CANCELLED | OUTPATIENT
Start: 2021-09-13

## 2021-09-13 RX ORDER — CEFAZOLIN SODIUM 2 G/50ML
2 SOLUTION INTRAVENOUS
Status: CANCELLED | OUTPATIENT
Start: 2021-09-13

## 2021-09-13 RX ORDER — KETOROLAC TROMETHAMINE 30 MG/ML
15 INJECTION, SOLUTION INTRAMUSCULAR; INTRAVENOUS
Status: CANCELLED | OUTPATIENT
Start: 2021-09-13 | End: 2021-09-13

## 2021-09-13 RX ORDER — HEPARIN SODIUM 5000 [USP'U]/ML
5000 INJECTION, SOLUTION INTRAVENOUS; SUBCUTANEOUS
Status: CANCELLED | OUTPATIENT
Start: 2021-09-13 | End: 2021-09-13

## 2021-09-13 RX ORDER — PREGABALIN 150 MG/1
150 CAPSULE ORAL
Status: CANCELLED | OUTPATIENT
Start: 2021-09-13 | End: 2021-09-13

## 2021-09-14 ENCOUNTER — OFFICE VISIT (OUTPATIENT)
Dept: INTERNAL MEDICINE | Facility: CLINIC | Age: 66
End: 2021-09-14
Payer: MEDICARE

## 2021-09-14 ENCOUNTER — HOSPITAL ENCOUNTER (OUTPATIENT)
Dept: PREADMISSION TESTING | Facility: HOSPITAL | Age: 66
Discharge: HOME | End: 2021-09-14
Attending: UROLOGY
Payer: MEDICARE

## 2021-09-14 ENCOUNTER — HOSPITAL ENCOUNTER (OUTPATIENT)
Dept: CARDIOLOGY | Facility: CLINIC | Age: 66
Discharge: HOME | End: 2021-09-14
Payer: MEDICARE

## 2021-09-14 VITALS
DIASTOLIC BLOOD PRESSURE: 96 MMHG | HEART RATE: 64 BPM | BODY MASS INDEX: 30.98 KG/M2 | RESPIRATION RATE: 18 BRPM | SYSTOLIC BLOOD PRESSURE: 160 MMHG | WEIGHT: 215.88 LBS

## 2021-09-14 DIAGNOSIS — Z98.890 HISTORY OF INGUINAL HERNIA REPAIR: ICD-10-CM

## 2021-09-14 DIAGNOSIS — Z01.818 PREOPERATIVE TESTING: ICD-10-CM

## 2021-09-14 DIAGNOSIS — C61 MALIGNANT NEOPLASM OF PROSTATE: ICD-10-CM

## 2021-09-14 DIAGNOSIS — E66.3 OVERWEIGHT: ICD-10-CM

## 2021-09-14 DIAGNOSIS — N28.1 RENAL CYST: ICD-10-CM

## 2021-09-14 DIAGNOSIS — E78.5 HYPERLIPIDEMIA, UNSPECIFIED HYPERLIPIDEMIA TYPE: Chronic | ICD-10-CM

## 2021-09-14 DIAGNOSIS — R31.9 HEMATURIA, UNSPECIFIED TYPE: ICD-10-CM

## 2021-09-14 DIAGNOSIS — R06.83 SNORING: ICD-10-CM

## 2021-09-14 DIAGNOSIS — R94.31 ABNORMAL EKG: ICD-10-CM

## 2021-09-14 DIAGNOSIS — T78.40XD ALLERGY, SUBSEQUENT ENCOUNTER: ICD-10-CM

## 2021-09-14 DIAGNOSIS — Z01.818 PREOP EXAMINATION: Primary | ICD-10-CM

## 2021-09-14 DIAGNOSIS — R60.9 EDEMA, UNSPECIFIED TYPE: ICD-10-CM

## 2021-09-14 DIAGNOSIS — I10 ESSENTIAL HYPERTENSION: Chronic | ICD-10-CM

## 2021-09-14 DIAGNOSIS — Z87.81 HISTORY OF FRACTURE: ICD-10-CM

## 2021-09-14 DIAGNOSIS — D49.0 IPMN (INTRADUCTAL PAPILLARY MUCINOUS NEOPLASM): ICD-10-CM

## 2021-09-14 DIAGNOSIS — Z87.19 HISTORY OF INGUINAL HERNIA REPAIR: ICD-10-CM

## 2021-09-14 DIAGNOSIS — Z79.02 LONG TERM (CURRENT) USE OF ANTITHROMBOTICS/ANTIPLATELETS: ICD-10-CM

## 2021-09-14 PROBLEM — K40.90 LEFT INGUINAL HERNIA: Status: RESOLVED | Noted: 2020-08-14 | Resolved: 2021-09-14

## 2021-09-14 PROBLEM — T78.40XA ALLERGIES: Status: ACTIVE | Noted: 2021-09-14

## 2021-09-14 PROBLEM — K40.90 INGUINAL HERNIA OF LEFT SIDE WITHOUT OBSTRUCTION OR GANGRENE: Status: RESOLVED | Noted: 2020-08-11 | Resolved: 2021-09-14

## 2021-09-14 PROCEDURE — 93010 EKG 12-LEAD: ICD-10-PCS | Mod: S$GLB,,, | Performed by: INTERNAL MEDICINE

## 2021-09-14 PROCEDURE — 1159F PR MEDICATION LIST DOCUMENTED IN MEDICAL RECORD: ICD-10-PCS | Mod: CPTII,S$GLB,, | Performed by: HOSPITALIST

## 2021-09-14 PROCEDURE — 93005 ELECTROCARDIOGRAM TRACING: CPT | Mod: S$GLB,,, | Performed by: ANESTHESIOLOGY

## 2021-09-14 PROCEDURE — 93010 ELECTROCARDIOGRAM REPORT: CPT | Mod: S$GLB,,, | Performed by: INTERNAL MEDICINE

## 2021-09-14 PROCEDURE — 99214 PR OFFICE/OUTPT VISIT, EST, LEVL IV, 30-39 MIN: ICD-10-PCS | Mod: S$GLB,,, | Performed by: HOSPITALIST

## 2021-09-14 PROCEDURE — 99999 PR PBB SHADOW E&M-EST. PATIENT-LVL II: CPT | Mod: PBBFAC,,, | Performed by: HOSPITALIST

## 2021-09-14 PROCEDURE — 99214 OFFICE O/P EST MOD 30 MIN: CPT | Mod: S$GLB,,, | Performed by: HOSPITALIST

## 2021-09-14 PROCEDURE — 1160F PR REVIEW ALL MEDS BY PRESCRIBER/CLIN PHARMACIST DOCUMENTED: ICD-10-PCS | Mod: CPTII,S$GLB,, | Performed by: HOSPITALIST

## 2021-09-14 PROCEDURE — 1159F MED LIST DOCD IN RCRD: CPT | Mod: CPTII,S$GLB,, | Performed by: HOSPITALIST

## 2021-09-14 PROCEDURE — 93005 EKG 12-LEAD: ICD-10-PCS | Mod: S$GLB,,, | Performed by: ANESTHESIOLOGY

## 2021-09-14 PROCEDURE — 1160F RVW MEDS BY RX/DR IN RCRD: CPT | Mod: CPTII,S$GLB,, | Performed by: HOSPITALIST

## 2021-09-14 PROCEDURE — 99999 PR PBB SHADOW E&M-EST. PATIENT-LVL II: ICD-10-PCS | Mod: PBBFAC,,, | Performed by: HOSPITALIST

## 2021-09-14 RX ORDER — NAPROXEN SODIUM 220 MG
220 TABLET ORAL DAILY PRN
COMMUNITY
End: 2022-04-07

## 2021-09-17 ENCOUNTER — LAB VISIT (OUTPATIENT)
Dept: PRIMARY CARE CLINIC | Facility: CLINIC | Age: 66
DRG: 707 | End: 2021-09-17
Payer: MEDICARE

## 2021-09-17 ENCOUNTER — ANESTHESIA EVENT (OUTPATIENT)
Dept: SURGERY | Facility: HOSPITAL | Age: 66
DRG: 707 | End: 2021-09-17
Payer: MEDICARE

## 2021-09-17 DIAGNOSIS — C61 PROSTATE CANCER: ICD-10-CM

## 2021-09-17 PROCEDURE — U0003 INFECTIOUS AGENT DETECTION BY NUCLEIC ACID (DNA OR RNA); SEVERE ACUTE RESPIRATORY SYNDROME CORONAVIRUS 2 (SARS-COV-2) (CORONAVIRUS DISEASE [COVID-19]), AMPLIFIED PROBE TECHNIQUE, MAKING USE OF HIGH THROUGHPUT TECHNOLOGIES AS DESCRIBED BY CMS-2020-01-R: HCPCS | Performed by: UROLOGY

## 2021-09-17 PROCEDURE — U0005 INFEC AGEN DETEC AMPLI PROBE: HCPCS | Performed by: UROLOGY

## 2021-09-18 LAB
SARS-COV-2 RNA RESP QL NAA+PROBE: NOT DETECTED
SARS-COV-2- CYCLE NUMBER: NORMAL

## 2021-09-20 ENCOUNTER — ANESTHESIA (OUTPATIENT)
Dept: SURGERY | Facility: HOSPITAL | Age: 66
DRG: 707 | End: 2021-09-20
Payer: MEDICARE

## 2021-09-20 ENCOUNTER — HOSPITAL ENCOUNTER (INPATIENT)
Facility: HOSPITAL | Age: 66
LOS: 1 days | Discharge: HOME | DRG: 707 | End: 2021-09-20
Attending: UROLOGY | Admitting: UROLOGY
Payer: MEDICARE

## 2021-09-20 VITALS
WEIGHT: 214.31 LBS | TEMPERATURE: 97 F | SYSTOLIC BLOOD PRESSURE: 144 MMHG | DIASTOLIC BLOOD PRESSURE: 83 MMHG | BODY MASS INDEX: 30.68 KG/M2 | RESPIRATION RATE: 10 BRPM | OXYGEN SATURATION: 98 % | HEIGHT: 70 IN | HEART RATE: 67 BPM

## 2021-09-20 DIAGNOSIS — C61 PROSTATE CANCER: Primary | ICD-10-CM

## 2021-09-20 DIAGNOSIS — Z01.818 PREOPERATIVE TESTING: ICD-10-CM

## 2021-09-20 PROBLEM — R31.9 HEMATURIA: Status: RESOLVED | Noted: 2021-07-12 | Resolved: 2021-09-20

## 2021-09-20 LAB
ABO + RH BLD: NORMAL
BLD GP AB SCN CELLS X3 SERPL QL: NORMAL

## 2021-09-20 PROCEDURE — 25000003 PHARM REV CODE 250: Performed by: STUDENT IN AN ORGANIZED HEALTH CARE EDUCATION/TRAINING PROGRAM

## 2021-09-20 PROCEDURE — 88309 PR  SURG PATH,LEVEL VI: ICD-10-PCS | Mod: 26,,, | Performed by: STUDENT IN AN ORGANIZED HEALTH CARE EDUCATION/TRAINING PROGRAM

## 2021-09-20 PROCEDURE — 64461: ICD-10-PCS | Mod: 50,59,, | Performed by: ANESTHESIOLOGY

## 2021-09-20 PROCEDURE — 94761 N-INVAS EAR/PLS OXIMETRY MLT: CPT

## 2021-09-20 PROCEDURE — 38571 LAPAROSCOPY LYMPHADENECTOMY: CPT | Mod: 51,,, | Performed by: UROLOGY

## 2021-09-20 PROCEDURE — 25000003 PHARM REV CODE 250: Performed by: ANESTHESIOLOGY

## 2021-09-20 PROCEDURE — 37000008 HC ANESTHESIA 1ST 15 MINUTES: Performed by: UROLOGY

## 2021-09-20 PROCEDURE — 86900 BLOOD TYPING SEROLOGIC ABO: CPT | Performed by: STUDENT IN AN ORGANIZED HEALTH CARE EDUCATION/TRAINING PROGRAM

## 2021-09-20 PROCEDURE — 11000001 HC ACUTE MED/SURG PRIVATE ROOM

## 2021-09-20 PROCEDURE — 88309 TISSUE EXAM BY PATHOLOGIST: CPT | Mod: 26,,, | Performed by: STUDENT IN AN ORGANIZED HEALTH CARE EDUCATION/TRAINING PROGRAM

## 2021-09-20 PROCEDURE — 63600175 PHARM REV CODE 636 W HCPCS: Performed by: ANESTHESIOLOGY

## 2021-09-20 PROCEDURE — 88305 TISSUE EXAM BY PATHOLOGIST: ICD-10-PCS | Mod: 26,,, | Performed by: STUDENT IN AN ORGANIZED HEALTH CARE EDUCATION/TRAINING PROGRAM

## 2021-09-20 PROCEDURE — 37000009 HC ANESTHESIA EA ADD 15 MINS: Performed by: UROLOGY

## 2021-09-20 PROCEDURE — 63600175 PHARM REV CODE 636 W HCPCS: Performed by: STUDENT IN AN ORGANIZED HEALTH CARE EDUCATION/TRAINING PROGRAM

## 2021-09-20 PROCEDURE — D9220A PRA ANESTHESIA: Mod: ,,, | Performed by: ANESTHESIOLOGY

## 2021-09-20 PROCEDURE — 88342 IMHCHEM/IMCYTCHM 1ST ANTB: CPT | Mod: 26,,, | Performed by: STUDENT IN AN ORGANIZED HEALTH CARE EDUCATION/TRAINING PROGRAM

## 2021-09-20 PROCEDURE — 71000033 HC RECOVERY, INTIAL HOUR: Performed by: UROLOGY

## 2021-09-20 PROCEDURE — 27201423 OPTIME MED/SURG SUP & DEVICES STERILE SUPPLY: Performed by: UROLOGY

## 2021-09-20 PROCEDURE — 71000016 HC POSTOP RECOV ADDL HR: Performed by: UROLOGY

## 2021-09-20 PROCEDURE — 55866 LAPS SURG PRST8ECT RPBIC RAD: CPT | Mod: ,,, | Performed by: UROLOGY

## 2021-09-20 PROCEDURE — 88305 TISSUE EXAM BY PATHOLOGIST: CPT | Mod: 26,,, | Performed by: STUDENT IN AN ORGANIZED HEALTH CARE EDUCATION/TRAINING PROGRAM

## 2021-09-20 PROCEDURE — 64461 PVB THORACIC SINGLE INJ SITE: CPT | Performed by: STUDENT IN AN ORGANIZED HEALTH CARE EDUCATION/TRAINING PROGRAM

## 2021-09-20 PROCEDURE — 88305 TISSUE EXAM BY PATHOLOGIST: CPT | Mod: 59 | Performed by: STUDENT IN AN ORGANIZED HEALTH CARE EDUCATION/TRAINING PROGRAM

## 2021-09-20 PROCEDURE — 71000015 HC POSTOP RECOV 1ST HR: Performed by: UROLOGY

## 2021-09-20 PROCEDURE — S0020 INJECTION, BUPIVICAINE HYDRO: HCPCS | Performed by: ANESTHESIOLOGY

## 2021-09-20 PROCEDURE — 88342 CHG IMMUNOCYTOCHEMISTRY: ICD-10-PCS | Mod: 26,,, | Performed by: STUDENT IN AN ORGANIZED HEALTH CARE EDUCATION/TRAINING PROGRAM

## 2021-09-20 PROCEDURE — 36000712 HC OR TIME LEV V 1ST 15 MIN: Performed by: UROLOGY

## 2021-09-20 PROCEDURE — 55866 PR LAP,PROSTATECTOMY,RADICAL,W/NERVE SPARE,INCL ROBOTIC: ICD-10-PCS | Mod: ,,, | Performed by: UROLOGY

## 2021-09-20 PROCEDURE — 36000713 HC OR TIME LEV V EA ADD 15 MIN: Performed by: UROLOGY

## 2021-09-20 PROCEDURE — D9220A PRA ANESTHESIA: ICD-10-PCS | Mod: ,,, | Performed by: ANESTHESIOLOGY

## 2021-09-20 PROCEDURE — 38571 PR LAP,PELVIC LYMPHADENECTOMY: ICD-10-PCS | Mod: 51,,, | Performed by: UROLOGY

## 2021-09-20 PROCEDURE — 64461 PVB THORACIC SINGLE INJ SITE: CPT | Mod: 50,59,, | Performed by: ANESTHESIOLOGY

## 2021-09-20 PROCEDURE — 88342 IMHCHEM/IMCYTCHM 1ST ANTB: CPT | Performed by: STUDENT IN AN ORGANIZED HEALTH CARE EDUCATION/TRAINING PROGRAM

## 2021-09-20 RX ORDER — HYDROMORPHONE HYDROCHLORIDE 1 MG/ML
0.2 INJECTION, SOLUTION INTRAMUSCULAR; INTRAVENOUS; SUBCUTANEOUS EVERY 5 MIN PRN
Status: DISCONTINUED | OUTPATIENT
Start: 2021-09-20 | End: 2021-09-20 | Stop reason: HOSPADM

## 2021-09-20 RX ORDER — NEOSTIGMINE METHYLSULFATE 0.5 MG/ML
INJECTION, SOLUTION INTRAVENOUS
Status: DISCONTINUED | OUTPATIENT
Start: 2021-09-20 | End: 2021-09-20

## 2021-09-20 RX ORDER — SODIUM CHLORIDE 0.9 % (FLUSH) 0.9 %
10 SYRINGE (ML) INJECTION
Status: DISCONTINUED | OUTPATIENT
Start: 2021-09-20 | End: 2021-09-20 | Stop reason: HOSPADM

## 2021-09-20 RX ORDER — AZELASTINE 1 MG/ML
1 SPRAY, METERED NASAL 2 TIMES DAILY
Status: DISCONTINUED | OUTPATIENT
Start: 2021-09-20 | End: 2021-09-20 | Stop reason: HOSPADM

## 2021-09-20 RX ORDER — KETOROLAC TROMETHAMINE 30 MG/ML
15 INJECTION, SOLUTION INTRAMUSCULAR; INTRAVENOUS EVERY 6 HOURS
Status: DISCONTINUED | OUTPATIENT
Start: 2021-09-20 | End: 2021-09-20 | Stop reason: HOSPADM

## 2021-09-20 RX ORDER — SODIUM CHLORIDE, SODIUM LACTATE, POTASSIUM CHLORIDE, CALCIUM CHLORIDE 600; 310; 30; 20 MG/100ML; MG/100ML; MG/100ML; MG/100ML
INJECTION, SOLUTION INTRAVENOUS CONTINUOUS
Status: DISCONTINUED | OUTPATIENT
Start: 2021-09-20 | End: 2021-09-20 | Stop reason: HOSPADM

## 2021-09-20 RX ORDER — ONDANSETRON 2 MG/ML
INJECTION INTRAMUSCULAR; INTRAVENOUS
Status: DISCONTINUED | OUTPATIENT
Start: 2021-09-20 | End: 2021-09-20

## 2021-09-20 RX ORDER — ONDANSETRON 2 MG/ML
4 INJECTION INTRAMUSCULAR; INTRAVENOUS EVERY 6 HOURS PRN
Status: DISCONTINUED | OUTPATIENT
Start: 2021-09-20 | End: 2021-09-20 | Stop reason: HOSPADM

## 2021-09-20 RX ORDER — OXYCODONE HYDROCHLORIDE 5 MG/1
5 TABLET ORAL EVERY 6 HOURS PRN
Qty: 10 TABLET | Refills: 0 | Status: ON HOLD | OUTPATIENT
Start: 2021-09-20 | End: 2022-01-24 | Stop reason: ALTCHOICE

## 2021-09-20 RX ORDER — KETOROLAC TROMETHAMINE 30 MG/ML
15 INJECTION, SOLUTION INTRAMUSCULAR; INTRAVENOUS
Status: COMPLETED | OUTPATIENT
Start: 2021-09-20 | End: 2021-09-20

## 2021-09-20 RX ORDER — CEFAZOLIN SODIUM 1 G/3ML
2 INJECTION, POWDER, FOR SOLUTION INTRAMUSCULAR; INTRAVENOUS
Status: COMPLETED | OUTPATIENT
Start: 2021-09-20 | End: 2021-09-20

## 2021-09-20 RX ORDER — NITROFURANTOIN MACROCRYSTALS 50 MG/1
50 CAPSULE ORAL NIGHTLY
Qty: 7 CAPSULE | Refills: 0 | Status: SHIPPED | OUTPATIENT
Start: 2021-09-20 | End: 2021-09-27

## 2021-09-20 RX ORDER — TRAMADOL HYDROCHLORIDE 50 MG/1
50 TABLET ORAL EVERY 6 HOURS PRN
Status: DISCONTINUED | OUTPATIENT
Start: 2021-09-20 | End: 2021-09-20 | Stop reason: HOSPADM

## 2021-09-20 RX ORDER — DEXAMETHASONE SODIUM PHOSPHATE 4 MG/ML
INJECTION, SOLUTION INTRA-ARTICULAR; INTRALESIONAL; INTRAMUSCULAR; INTRAVENOUS; SOFT TISSUE
Status: DISCONTINUED | OUTPATIENT
Start: 2021-09-20 | End: 2021-09-20

## 2021-09-20 RX ORDER — KETAMINE HCL IN 0.9 % NACL 50 MG/5 ML
SYRINGE (ML) INTRAVENOUS
Status: DISCONTINUED | OUTPATIENT
Start: 2021-09-20 | End: 2021-09-20

## 2021-09-20 RX ORDER — MIDAZOLAM HYDROCHLORIDE 1 MG/ML
2 INJECTION INTRAMUSCULAR; INTRAVENOUS
Status: DISCONTINUED | OUTPATIENT
Start: 2021-09-20 | End: 2021-09-20 | Stop reason: HOSPADM

## 2021-09-20 RX ORDER — HALOPERIDOL 5 MG/ML
0.5 INJECTION INTRAMUSCULAR EVERY 10 MIN PRN
Status: DISCONTINUED | OUTPATIENT
Start: 2021-09-20 | End: 2021-09-20 | Stop reason: HOSPADM

## 2021-09-20 RX ORDER — ONDANSETRON 2 MG/ML
4 INJECTION INTRAMUSCULAR; INTRAVENOUS DAILY PRN
Status: DISCONTINUED | OUTPATIENT
Start: 2021-09-20 | End: 2021-09-20 | Stop reason: HOSPADM

## 2021-09-20 RX ORDER — FENTANYL CITRATE 50 UG/ML
INJECTION, SOLUTION INTRAMUSCULAR; INTRAVENOUS
Status: DISCONTINUED | OUTPATIENT
Start: 2021-09-20 | End: 2021-09-20

## 2021-09-20 RX ORDER — METHOCARBAMOL 500 MG/1
1000 TABLET, FILM COATED ORAL 4 TIMES DAILY
Status: DISCONTINUED | OUTPATIENT
Start: 2021-09-20 | End: 2021-09-20 | Stop reason: HOSPADM

## 2021-09-20 RX ORDER — ACETAMINOPHEN 500 MG
1000 TABLET ORAL
Status: COMPLETED | OUTPATIENT
Start: 2021-09-20 | End: 2021-09-20

## 2021-09-20 RX ORDER — LIDOCAINE HYDROCHLORIDE 20 MG/ML
INJECTION, SOLUTION EPIDURAL; INFILTRATION; INTRACAUDAL; PERINEURAL
Status: DISCONTINUED | OUTPATIENT
Start: 2021-09-20 | End: 2021-09-20

## 2021-09-20 RX ORDER — BUPIVACAINE HYDROCHLORIDE 7.5 MG/ML
INJECTION, SOLUTION EPIDURAL; RETROBULBAR
Status: COMPLETED | OUTPATIENT
Start: 2021-09-20 | End: 2021-09-20

## 2021-09-20 RX ORDER — FENTANYL CITRATE 50 UG/ML
100 INJECTION, SOLUTION INTRAMUSCULAR; INTRAVENOUS EVERY 5 MIN PRN
Status: DISCONTINUED | OUTPATIENT
Start: 2021-09-20 | End: 2021-09-20 | Stop reason: HOSPADM

## 2021-09-20 RX ORDER — HEPARIN SODIUM 5000 [USP'U]/ML
5000 INJECTION, SOLUTION INTRAVENOUS; SUBCUTANEOUS EVERY 8 HOURS
Status: DISCONTINUED | OUTPATIENT
Start: 2021-09-20 | End: 2021-09-20 | Stop reason: HOSPADM

## 2021-09-20 RX ORDER — ROCURONIUM BROMIDE 10 MG/ML
INJECTION, SOLUTION INTRAVENOUS
Status: DISCONTINUED | OUTPATIENT
Start: 2021-09-20 | End: 2021-09-20

## 2021-09-20 RX ORDER — ACETAMINOPHEN 500 MG
1000 TABLET ORAL
Status: DISCONTINUED | OUTPATIENT
Start: 2021-09-20 | End: 2021-09-20 | Stop reason: HOSPADM

## 2021-09-20 RX ORDER — FAMOTIDINE 20 MG/1
20 TABLET, FILM COATED ORAL 2 TIMES DAILY
Status: DISCONTINUED | OUTPATIENT
Start: 2021-09-20 | End: 2021-09-20 | Stop reason: HOSPADM

## 2021-09-20 RX ORDER — ACETAMINOPHEN 10 MG/ML
INJECTION, SOLUTION INTRAVENOUS
Status: DISCONTINUED | OUTPATIENT
Start: 2021-09-20 | End: 2021-09-20

## 2021-09-20 RX ORDER — SODIUM CHLORIDE 9 MG/ML
INJECTION, SOLUTION INTRAVENOUS CONTINUOUS
Status: DISCONTINUED | OUTPATIENT
Start: 2021-09-20 | End: 2021-09-20 | Stop reason: HOSPADM

## 2021-09-20 RX ORDER — OXYCODONE HYDROCHLORIDE 5 MG/1
5 TABLET ORAL EVERY 6 HOURS PRN
Status: DISCONTINUED | OUTPATIENT
Start: 2021-09-20 | End: 2021-09-20 | Stop reason: HOSPADM

## 2021-09-20 RX ORDER — PREGABALIN 150 MG/1
150 CAPSULE ORAL
Status: COMPLETED | OUTPATIENT
Start: 2021-09-20 | End: 2021-09-20

## 2021-09-20 RX ORDER — PROPOFOL 10 MG/ML
VIAL (ML) INTRAVENOUS
Status: DISCONTINUED | OUTPATIENT
Start: 2021-09-20 | End: 2021-09-20

## 2021-09-20 RX ORDER — HEPARIN SODIUM 5000 [USP'U]/ML
5000 INJECTION, SOLUTION INTRAVENOUS; SUBCUTANEOUS
Status: COMPLETED | OUTPATIENT
Start: 2021-09-20 | End: 2021-09-20

## 2021-09-20 RX ORDER — POLYETHYLENE GLYCOL 3350 17 G/17G
17 POWDER, FOR SOLUTION ORAL DAILY
Qty: 1 BOTTLE | Refills: 0 | COMMUNITY
Start: 2021-09-20 | End: 2022-04-07

## 2021-09-20 RX ORDER — ACETAMINOPHEN 500 MG
500 TABLET ORAL EVERY 6 HOURS
Qty: 30 TABLET | Refills: 0 | Status: SHIPPED | OUTPATIENT
Start: 2021-09-20 | End: 2021-09-28

## 2021-09-20 RX ORDER — FLUTICASONE PROPIONATE 50 MCG
2 SPRAY, SUSPENSION (ML) NASAL DAILY
Status: DISCONTINUED | OUTPATIENT
Start: 2021-09-20 | End: 2021-09-20 | Stop reason: HOSPADM

## 2021-09-20 RX ORDER — PROCHLORPERAZINE EDISYLATE 5 MG/ML
5 INJECTION INTRAMUSCULAR; INTRAVENOUS EVERY 6 HOURS PRN
Status: DISCONTINUED | OUTPATIENT
Start: 2021-09-20 | End: 2021-09-20 | Stop reason: HOSPADM

## 2021-09-20 RX ORDER — HYDROCHLOROTHIAZIDE 25 MG/1
25 TABLET ORAL DAILY
Status: DISCONTINUED | OUTPATIENT
Start: 2021-09-21 | End: 2021-09-20 | Stop reason: HOSPADM

## 2021-09-20 RX ADMIN — ROCURONIUM BROMIDE 10 MG: 10 INJECTION, SOLUTION INTRAVENOUS at 08:09

## 2021-09-20 RX ADMIN — SODIUM CHLORIDE: 0.9 INJECTION, SOLUTION INTRAVENOUS at 07:09

## 2021-09-20 RX ADMIN — FENTANYL CITRATE 100 MCG: 50 INJECTION INTRAMUSCULAR; INTRAVENOUS at 06:09

## 2021-09-20 RX ADMIN — KETOROLAC TROMETHAMINE 15 MG: 30 INJECTION, SOLUTION INTRAMUSCULAR; INTRAVENOUS at 06:09

## 2021-09-20 RX ADMIN — ROCURONIUM BROMIDE 20 MG: 10 INJECTION, SOLUTION INTRAVENOUS at 07:09

## 2021-09-20 RX ADMIN — ACETAMINOPHEN 1000 MG: 500 TABLET ORAL at 05:09

## 2021-09-20 RX ADMIN — CEFAZOLIN 2 G: 330 INJECTION, POWDER, FOR SOLUTION INTRAMUSCULAR; INTRAVENOUS at 07:09

## 2021-09-20 RX ADMIN — SODIUM CHLORIDE, SODIUM LACTATE, POTASSIUM CHLORIDE, AND CALCIUM CHLORIDE: .6; .31; .03; .02 INJECTION, SOLUTION INTRAVENOUS at 11:09

## 2021-09-20 RX ADMIN — ROCURONIUM BROMIDE 10 MG: 10 INJECTION, SOLUTION INTRAVENOUS at 10:09

## 2021-09-20 RX ADMIN — FENTANYL CITRATE 100 MCG: 50 INJECTION INTRAMUSCULAR; INTRAVENOUS at 07:09

## 2021-09-20 RX ADMIN — PREGABALIN 150 MG: 150 CAPSULE ORAL at 05:09

## 2021-09-20 RX ADMIN — GLYCOPYRROLATE 1 MG: 0.2 INJECTION, SOLUTION INTRAMUSCULAR; INTRAVITREAL at 10:09

## 2021-09-20 RX ADMIN — HEPARIN SODIUM 5000 UNITS: 5000 INJECTION INTRAVENOUS; SUBCUTANEOUS at 06:09

## 2021-09-20 RX ADMIN — ROCURONIUM BROMIDE 10 MG: 10 INJECTION, SOLUTION INTRAVENOUS at 09:09

## 2021-09-20 RX ADMIN — LIDOCAINE HYDROCHLORIDE 100 MG: 20 INJECTION, SOLUTION EPIDURAL; INFILTRATION; INTRACAUDAL at 07:09

## 2021-09-20 RX ADMIN — BUPIVACAINE HYDROCHLORIDE 15 ML: 7.5 INJECTION, SOLUTION EPIDURAL; RETROBULBAR at 08:09

## 2021-09-20 RX ADMIN — FAMOTIDINE 20 MG: 20 TABLET, FILM COATED ORAL at 12:09

## 2021-09-20 RX ADMIN — DEXAMETHASONE SODIUM PHOSPHATE 8 MG: 4 INJECTION INTRA-ARTICULAR; INTRALESIONAL; INTRAMUSCULAR; INTRAVENOUS; SOFT TISSUE at 07:09

## 2021-09-20 RX ADMIN — ACETAMINOPHEN 1000 MG: 10 INJECTION INTRAVENOUS at 10:09

## 2021-09-20 RX ADMIN — Medication 30 MG: at 08:09

## 2021-09-20 RX ADMIN — PROPOFOL 20 MG: 10 INJECTION, EMULSION INTRAVENOUS at 11:09

## 2021-09-20 RX ADMIN — ROCURONIUM BROMIDE 60 MG: 10 INJECTION, SOLUTION INTRAVENOUS at 07:09

## 2021-09-20 RX ADMIN — ONDANSETRON 4 MG: 2 INJECTION INTRAMUSCULAR; INTRAVENOUS at 10:09

## 2021-09-20 RX ADMIN — FENTANYL CITRATE 50 MCG: 50 INJECTION INTRAMUSCULAR; INTRAVENOUS at 11:09

## 2021-09-20 RX ADMIN — HEPARIN SODIUM 5000 UNITS: 5000 INJECTION INTRAVENOUS; SUBCUTANEOUS at 03:09

## 2021-09-20 RX ADMIN — KETOROLAC TROMETHAMINE 15 MG: 30 INJECTION, SOLUTION INTRAMUSCULAR; INTRAVENOUS at 12:09

## 2021-09-20 RX ADMIN — SODIUM CHLORIDE: 0.9 INJECTION, SOLUTION INTRAVENOUS at 06:09

## 2021-09-20 RX ADMIN — OXYCODONE 5 MG: 5 TABLET ORAL at 12:09

## 2021-09-20 RX ADMIN — PROPOFOL 200 MG: 10 INJECTION, EMULSION INTRAVENOUS at 07:09

## 2021-09-20 RX ADMIN — NEOSTIGMINE METHYLSULFATE 5 MG: 0.5 INJECTION INTRAVENOUS at 10:09

## 2021-09-20 RX ADMIN — MIDAZOLAM 2 MG: 1 INJECTION INTRAMUSCULAR; INTRAVENOUS at 06:09

## 2021-09-24 ENCOUNTER — NURSE TRIAGE (OUTPATIENT)
Dept: ADMINISTRATIVE | Facility: CLINIC | Age: 66
End: 2021-09-24

## 2021-09-24 ENCOUNTER — TELEPHONE (OUTPATIENT)
Dept: UROLOGY | Facility: CLINIC | Age: 66
End: 2021-09-24

## 2021-09-28 ENCOUNTER — OFFICE VISIT (OUTPATIENT)
Dept: UROLOGY | Facility: CLINIC | Age: 66
End: 2021-09-28
Payer: MEDICARE

## 2021-09-28 VITALS
WEIGHT: 212 LBS | BODY MASS INDEX: 30.35 KG/M2 | HEIGHT: 70 IN | RESPIRATION RATE: 18 BRPM | TEMPERATURE: 99 F | SYSTOLIC BLOOD PRESSURE: 138 MMHG | DIASTOLIC BLOOD PRESSURE: 93 MMHG | HEART RATE: 72 BPM

## 2021-09-28 DIAGNOSIS — C61 PROSTATE CANCER: Primary | ICD-10-CM

## 2021-09-28 LAB
FINAL PATHOLOGIC DIAGNOSIS: NORMAL
Lab: NORMAL

## 2021-09-28 PROCEDURE — 3080F PR MOST RECENT DIASTOLIC BLOOD PRESSURE >= 90 MM HG: ICD-10-PCS | Mod: CPTII,S$GLB,, | Performed by: UROLOGY

## 2021-09-28 PROCEDURE — 3075F PR MOST RECENT SYSTOLIC BLOOD PRESS GE 130-139MM HG: ICD-10-PCS | Mod: CPTII,S$GLB,, | Performed by: UROLOGY

## 2021-09-28 PROCEDURE — 3080F DIAST BP >= 90 MM HG: CPT | Mod: CPTII,S$GLB,, | Performed by: UROLOGY

## 2021-09-28 PROCEDURE — 1159F MED LIST DOCD IN RCRD: CPT | Mod: CPTII,S$GLB,, | Performed by: UROLOGY

## 2021-09-28 PROCEDURE — 3288F FALL RISK ASSESSMENT DOCD: CPT | Mod: CPTII,S$GLB,, | Performed by: UROLOGY

## 2021-09-28 PROCEDURE — 3008F BODY MASS INDEX DOCD: CPT | Mod: CPTII,S$GLB,, | Performed by: UROLOGY

## 2021-09-28 PROCEDURE — 99999 PR PBB SHADOW E&M-EST. PATIENT-LVL IV: ICD-10-PCS | Mod: PBBFAC,,, | Performed by: UROLOGY

## 2021-09-28 PROCEDURE — 3075F SYST BP GE 130 - 139MM HG: CPT | Mod: CPTII,S$GLB,, | Performed by: UROLOGY

## 2021-09-28 PROCEDURE — 1111F PR DISCHARGE MEDS RECONCILED W/ CURRENT OUTPATIENT MED LIST: ICD-10-PCS | Mod: CPTII,S$GLB,, | Performed by: UROLOGY

## 2021-09-28 PROCEDURE — 3288F PR FALLS RISK ASSESSMENT DOCUMENTED: ICD-10-PCS | Mod: CPTII,S$GLB,, | Performed by: UROLOGY

## 2021-09-28 PROCEDURE — 99213 OFFICE O/P EST LOW 20 MIN: CPT | Mod: S$GLB,,, | Performed by: UROLOGY

## 2021-09-28 PROCEDURE — 99999 PR PBB SHADOW E&M-EST. PATIENT-LVL IV: CPT | Mod: PBBFAC,,, | Performed by: UROLOGY

## 2021-09-28 PROCEDURE — 1111F DSCHRG MED/CURRENT MED MERGE: CPT | Mod: CPTII,S$GLB,, | Performed by: UROLOGY

## 2021-09-28 PROCEDURE — 99213 PR OFFICE/OUTPT VISIT, EST, LEVL III, 20-29 MIN: ICD-10-PCS | Mod: S$GLB,,, | Performed by: UROLOGY

## 2021-09-28 PROCEDURE — 1101F PR PT FALLS ASSESS DOC 0-1 FALLS W/OUT INJ PAST YR: ICD-10-PCS | Mod: CPTII,S$GLB,, | Performed by: UROLOGY

## 2021-09-28 PROCEDURE — 3008F PR BODY MASS INDEX (BMI) DOCUMENTED: ICD-10-PCS | Mod: CPTII,S$GLB,, | Performed by: UROLOGY

## 2021-09-28 PROCEDURE — 1101F PT FALLS ASSESS-DOCD LE1/YR: CPT | Mod: CPTII,S$GLB,, | Performed by: UROLOGY

## 2021-09-28 PROCEDURE — 1159F PR MEDICATION LIST DOCUMENTED IN MEDICAL RECORD: ICD-10-PCS | Mod: CPTII,S$GLB,, | Performed by: UROLOGY

## 2021-09-28 RX ORDER — TADALAFIL 5 MG/1
5 TABLET ORAL DAILY PRN
Qty: 30 TABLET | Refills: 11 | Status: SHIPPED | OUTPATIENT
Start: 2021-09-28 | End: 2021-11-04 | Stop reason: SDUPTHER

## 2021-10-18 ENCOUNTER — TELEPHONE (OUTPATIENT)
Dept: UROLOGY | Facility: CLINIC | Age: 66
End: 2021-10-18

## 2021-11-02 ENCOUNTER — LAB VISIT (OUTPATIENT)
Dept: LAB | Facility: HOSPITAL | Age: 66
End: 2021-11-02
Attending: FAMILY MEDICINE
Payer: MEDICARE

## 2021-11-02 DIAGNOSIS — C61 PROSTATE CANCER: ICD-10-CM

## 2021-11-02 LAB — COMPLEXED PSA SERPL-MCNC: 0.01 NG/ML (ref 0–4)

## 2021-11-02 PROCEDURE — 84153 ASSAY OF PSA TOTAL: CPT | Performed by: STUDENT IN AN ORGANIZED HEALTH CARE EDUCATION/TRAINING PROGRAM

## 2021-11-02 PROCEDURE — 36415 COLL VENOUS BLD VENIPUNCTURE: CPT | Performed by: STUDENT IN AN ORGANIZED HEALTH CARE EDUCATION/TRAINING PROGRAM

## 2021-11-04 ENCOUNTER — OFFICE VISIT (OUTPATIENT)
Dept: UROLOGY | Facility: CLINIC | Age: 66
End: 2021-11-04
Payer: MEDICARE

## 2021-11-04 VITALS
HEART RATE: 74 BPM | SYSTOLIC BLOOD PRESSURE: 141 MMHG | WEIGHT: 207.25 LBS | DIASTOLIC BLOOD PRESSURE: 86 MMHG | HEIGHT: 70 IN | BODY MASS INDEX: 29.67 KG/M2

## 2021-11-04 VITALS
SYSTOLIC BLOOD PRESSURE: 141 MMHG | HEIGHT: 70 IN | BODY MASS INDEX: 29.63 KG/M2 | DIASTOLIC BLOOD PRESSURE: 86 MMHG | WEIGHT: 207 LBS | HEART RATE: 74 BPM

## 2021-11-04 DIAGNOSIS — C61 MALIGNANT NEOPLASM OF PROSTATE: Primary | ICD-10-CM

## 2021-11-04 DIAGNOSIS — C61 PROSTATE CANCER: ICD-10-CM

## 2021-11-04 DIAGNOSIS — E78.5 HYPERLIPIDEMIA, UNSPECIFIED HYPERLIPIDEMIA TYPE: Chronic | ICD-10-CM

## 2021-11-04 DIAGNOSIS — N52.31 ERECTILE DYSFUNCTION FOLLOWING RADICAL PROSTATECTOMY: Primary | ICD-10-CM

## 2021-11-04 DIAGNOSIS — I10 PRIMARY HYPERTENSION: Chronic | ICD-10-CM

## 2021-11-04 DIAGNOSIS — C61 MALIGNANT NEOPLASM OF PROSTATE: ICD-10-CM

## 2021-11-04 PROBLEM — Z86.010 HISTORY OF COLON POLYPS: Status: RESOLVED | Noted: 2018-01-25 | Resolved: 2021-11-04

## 2021-11-04 PROBLEM — Z87.81 HISTORY OF FRACTURE: Status: RESOLVED | Noted: 2021-09-14 | Resolved: 2021-11-04

## 2021-11-04 PROBLEM — Z86.0100 HISTORY OF COLON POLYPS: Status: RESOLVED | Noted: 2018-01-25 | Resolved: 2021-11-04

## 2021-11-04 PROBLEM — Z87.19 HISTORY OF INGUINAL HERNIA REPAIR: Status: RESOLVED | Noted: 2021-09-14 | Resolved: 2021-11-04

## 2021-11-04 PROBLEM — Z98.890 HISTORY OF INGUINAL HERNIA REPAIR: Status: RESOLVED | Noted: 2021-09-14 | Resolved: 2021-11-04

## 2021-11-04 PROCEDURE — 3288F PR FALLS RISK ASSESSMENT DOCUMENTED: ICD-10-PCS | Mod: CPTII,S$GLB,, | Performed by: UROLOGY

## 2021-11-04 PROCEDURE — 3079F DIAST BP 80-89 MM HG: CPT | Mod: CPTII,S$GLB,, | Performed by: UROLOGY

## 2021-11-04 PROCEDURE — 99024 POSTOP FOLLOW-UP VISIT: CPT | Mod: S$GLB,,, | Performed by: UROLOGY

## 2021-11-04 PROCEDURE — 1160F RVW MEDS BY RX/DR IN RCRD: CPT | Mod: CPTII,S$GLB,, | Performed by: UROLOGY

## 2021-11-04 PROCEDURE — 99024 PR POST-OP FOLLOW-UP VISIT: ICD-10-PCS | Mod: S$GLB,,, | Performed by: UROLOGY

## 2021-11-04 PROCEDURE — 99999 PR PBB SHADOW E&M-EST. PATIENT-LVL IV: CPT | Mod: PBBFAC,,, | Performed by: UROLOGY

## 2021-11-04 PROCEDURE — 3008F PR BODY MASS INDEX (BMI) DOCUMENTED: ICD-10-PCS | Mod: CPTII,S$GLB,, | Performed by: UROLOGY

## 2021-11-04 PROCEDURE — 1160F PR REVIEW ALL MEDS BY PRESCRIBER/CLIN PHARMACIST DOCUMENTED: ICD-10-PCS | Mod: CPTII,S$GLB,, | Performed by: UROLOGY

## 2021-11-04 PROCEDURE — 1159F MED LIST DOCD IN RCRD: CPT | Mod: CPTII,S$GLB,, | Performed by: UROLOGY

## 2021-11-04 PROCEDURE — 3288F FALL RISK ASSESSMENT DOCD: CPT | Mod: CPTII,S$GLB,, | Performed by: UROLOGY

## 2021-11-04 PROCEDURE — 99999 PR PBB SHADOW E&M-EST. PATIENT-LVL III: CPT | Mod: PBBFAC,,, | Performed by: UROLOGY

## 2021-11-04 PROCEDURE — 3008F BODY MASS INDEX DOCD: CPT | Mod: CPTII,S$GLB,, | Performed by: UROLOGY

## 2021-11-04 PROCEDURE — 3077F SYST BP >= 140 MM HG: CPT | Mod: CPTII,S$GLB,, | Performed by: UROLOGY

## 2021-11-04 PROCEDURE — 99999 PR PBB SHADOW E&M-EST. PATIENT-LVL IV: ICD-10-PCS | Mod: PBBFAC,,, | Performed by: UROLOGY

## 2021-11-04 PROCEDURE — 3077F PR MOST RECENT SYSTOLIC BLOOD PRESSURE >= 140 MM HG: ICD-10-PCS | Mod: CPTII,S$GLB,, | Performed by: UROLOGY

## 2021-11-04 PROCEDURE — 1101F PR PT FALLS ASSESS DOC 0-1 FALLS W/OUT INJ PAST YR: ICD-10-PCS | Mod: CPTII,S$GLB,, | Performed by: UROLOGY

## 2021-11-04 PROCEDURE — 1159F PR MEDICATION LIST DOCUMENTED IN MEDICAL RECORD: ICD-10-PCS | Mod: CPTII,S$GLB,, | Performed by: UROLOGY

## 2021-11-04 PROCEDURE — 99999 PR PBB SHADOW E&M-EST. PATIENT-LVL III: ICD-10-PCS | Mod: PBBFAC,,, | Performed by: UROLOGY

## 2021-11-04 PROCEDURE — 3079F PR MOST RECENT DIASTOLIC BLOOD PRESSURE 80-89 MM HG: ICD-10-PCS | Mod: CPTII,S$GLB,, | Performed by: UROLOGY

## 2021-11-04 PROCEDURE — 1101F PT FALLS ASSESS-DOCD LE1/YR: CPT | Mod: CPTII,S$GLB,, | Performed by: UROLOGY

## 2021-11-04 RX ORDER — TADALAFIL 5 MG/1
5 TABLET ORAL DAILY PRN
Qty: 30 TABLET | Refills: 11 | Status: SHIPPED | OUTPATIENT
Start: 2021-11-04 | End: 2022-04-07

## 2021-11-05 ENCOUNTER — PATIENT MESSAGE (OUTPATIENT)
Dept: INTERNAL MEDICINE | Facility: CLINIC | Age: 66
End: 2021-11-05
Payer: MEDICARE

## 2021-11-05 ENCOUNTER — PATIENT MESSAGE (OUTPATIENT)
Dept: UROLOGY | Facility: CLINIC | Age: 66
End: 2021-11-05
Payer: MEDICARE

## 2021-11-10 ENCOUNTER — TELEPHONE (OUTPATIENT)
Dept: INTERNAL MEDICINE | Facility: CLINIC | Age: 66
End: 2021-11-10
Payer: MEDICARE

## 2021-11-11 RX ORDER — AMLODIPINE BESYLATE 5 MG/1
5 TABLET ORAL DAILY
Qty: 30 TABLET | Refills: 11 | Status: SHIPPED | OUTPATIENT
Start: 2021-11-11 | End: 2022-04-21

## 2021-11-15 ENCOUNTER — PATIENT MESSAGE (OUTPATIENT)
Dept: GASTROENTEROLOGY | Facility: CLINIC | Age: 66
End: 2021-11-15
Payer: MEDICARE

## 2021-11-15 DIAGNOSIS — D49.0 IPMN (INTRADUCTAL PAPILLARY MUCINOUS NEOPLASM): Primary | ICD-10-CM

## 2021-11-23 ENCOUNTER — PATIENT OUTREACH (OUTPATIENT)
Dept: ADMINISTRATIVE | Facility: OTHER | Age: 66
End: 2021-11-23
Payer: MEDICARE

## 2021-11-29 ENCOUNTER — OFFICE VISIT (OUTPATIENT)
Dept: OPHTHALMOLOGY | Facility: CLINIC | Age: 66
End: 2021-11-29
Payer: MEDICARE

## 2021-11-29 ENCOUNTER — CLINICAL SUPPORT (OUTPATIENT)
Dept: REHABILITATION | Facility: HOSPITAL | Age: 66
End: 2021-11-29
Attending: UROLOGY
Payer: MEDICARE

## 2021-11-29 DIAGNOSIS — H25.13 NUCLEAR SCLEROSIS, BILATERAL: Primary | ICD-10-CM

## 2021-11-29 DIAGNOSIS — H52.203 MYOPIA WITH ASTIGMATISM AND PRESBYOPIA, BILATERAL: ICD-10-CM

## 2021-11-29 DIAGNOSIS — H52.13 MYOPIA WITH ASTIGMATISM AND PRESBYOPIA, BILATERAL: ICD-10-CM

## 2021-11-29 DIAGNOSIS — C61 MALIGNANT NEOPLASM OF PROSTATE: ICD-10-CM

## 2021-11-29 DIAGNOSIS — H52.4 MYOPIA WITH ASTIGMATISM AND PRESBYOPIA, BILATERAL: ICD-10-CM

## 2021-11-29 PROCEDURE — 92014 PR EYE EXAM, EST PATIENT,COMPREHESV: ICD-10-PCS | Mod: S$GLB,,, | Performed by: OPTOMETRIST

## 2021-11-29 PROCEDURE — 97110 THERAPEUTIC EXERCISES: CPT

## 2021-11-29 PROCEDURE — 92014 COMPRE OPH EXAM EST PT 1/>: CPT | Mod: S$GLB,,, | Performed by: OPTOMETRIST

## 2021-11-29 PROCEDURE — 99999 PR PBB SHADOW E&M-EST. PATIENT-LVL III: CPT | Mod: PBBFAC,,, | Performed by: OPTOMETRIST

## 2021-11-29 PROCEDURE — 99999 PR PBB SHADOW E&M-EST. PATIENT-LVL III: ICD-10-PCS | Mod: PBBFAC,,, | Performed by: OPTOMETRIST

## 2021-11-29 PROCEDURE — 97161 PT EVAL LOW COMPLEX 20 MIN: CPT

## 2021-11-29 PROCEDURE — 92015 PR REFRACTION: ICD-10-PCS | Mod: S$GLB,,, | Performed by: OPTOMETRIST

## 2021-11-29 PROCEDURE — 92015 DETERMINE REFRACTIVE STATE: CPT | Mod: S$GLB,,, | Performed by: OPTOMETRIST

## 2021-12-01 ENCOUNTER — OFFICE VISIT (OUTPATIENT)
Dept: DERMATOLOGY | Facility: CLINIC | Age: 66
End: 2021-12-01
Payer: MEDICARE

## 2021-12-01 DIAGNOSIS — L82.1 SEBORRHEIC KERATOSIS: ICD-10-CM

## 2021-12-01 DIAGNOSIS — Z85.828 HISTORY OF SKIN CANCER: ICD-10-CM

## 2021-12-01 DIAGNOSIS — L90.5 SCAR CONDITIONS/SKIN FIBROSIS: Primary | ICD-10-CM

## 2021-12-01 DIAGNOSIS — Z12.83 SCREENING, MALIGNANT NEOPLASM, SKIN: ICD-10-CM

## 2021-12-01 PROCEDURE — 99999 PR PBB SHADOW E&M-EST. PATIENT-LVL III: ICD-10-PCS | Mod: PBBFAC,,, | Performed by: DERMATOLOGY

## 2021-12-01 PROCEDURE — 99213 OFFICE O/P EST LOW 20 MIN: CPT | Mod: S$GLB,,, | Performed by: DERMATOLOGY

## 2021-12-01 PROCEDURE — 99213 PR OFFICE/OUTPT VISIT, EST, LEVL III, 20-29 MIN: ICD-10-PCS | Mod: S$GLB,,, | Performed by: DERMATOLOGY

## 2021-12-01 PROCEDURE — 99999 PR PBB SHADOW E&M-EST. PATIENT-LVL III: CPT | Mod: PBBFAC,,, | Performed by: DERMATOLOGY

## 2021-12-10 ENCOUNTER — CLINICAL SUPPORT (OUTPATIENT)
Dept: REHABILITATION | Facility: HOSPITAL | Age: 66
End: 2021-12-10
Payer: MEDICARE

## 2021-12-10 DIAGNOSIS — R53.1 DECREASED STRENGTH, ENDURANCE, AND MOBILITY: Primary | ICD-10-CM

## 2021-12-10 DIAGNOSIS — Z74.09 DECREASED STRENGTH, ENDURANCE, AND MOBILITY: Primary | ICD-10-CM

## 2021-12-10 DIAGNOSIS — R68.89 DECREASED STRENGTH, ENDURANCE, AND MOBILITY: Primary | ICD-10-CM

## 2021-12-10 DIAGNOSIS — N39.46 MIXED STRESS AND URGE URINARY INCONTINENCE: ICD-10-CM

## 2021-12-10 PROCEDURE — 97110 THERAPEUTIC EXERCISES: CPT

## 2021-12-10 PROCEDURE — 97112 NEUROMUSCULAR REEDUCATION: CPT

## 2021-12-16 ENCOUNTER — CLINICAL SUPPORT (OUTPATIENT)
Dept: REHABILITATION | Facility: HOSPITAL | Age: 66
End: 2021-12-16
Payer: MEDICARE

## 2021-12-16 DIAGNOSIS — N39.46 MIXED STRESS AND URGE URINARY INCONTINENCE: ICD-10-CM

## 2021-12-16 DIAGNOSIS — Z74.09 DECREASED STRENGTH, ENDURANCE, AND MOBILITY: ICD-10-CM

## 2021-12-16 DIAGNOSIS — R53.1 DECREASED STRENGTH, ENDURANCE, AND MOBILITY: ICD-10-CM

## 2021-12-16 DIAGNOSIS — R68.89 DECREASED STRENGTH, ENDURANCE, AND MOBILITY: ICD-10-CM

## 2021-12-16 PROBLEM — R32 URINE INCONTINENCE: Status: ACTIVE | Noted: 2021-12-16

## 2021-12-16 PROCEDURE — 97112 NEUROMUSCULAR REEDUCATION: CPT

## 2021-12-16 PROCEDURE — 97110 THERAPEUTIC EXERCISES: CPT

## 2021-12-23 ENCOUNTER — CLINICAL SUPPORT (OUTPATIENT)
Dept: REHABILITATION | Facility: HOSPITAL | Age: 66
End: 2021-12-23
Payer: MEDICARE

## 2021-12-23 DIAGNOSIS — R68.89 DECREASED STRENGTH, ENDURANCE, AND MOBILITY: ICD-10-CM

## 2021-12-23 DIAGNOSIS — Z74.09 DECREASED STRENGTH, ENDURANCE, AND MOBILITY: ICD-10-CM

## 2021-12-23 DIAGNOSIS — N39.46 MIXED STRESS AND URGE URINARY INCONTINENCE: ICD-10-CM

## 2021-12-23 DIAGNOSIS — R53.1 DECREASED STRENGTH, ENDURANCE, AND MOBILITY: ICD-10-CM

## 2021-12-23 PROCEDURE — 97110 THERAPEUTIC EXERCISES: CPT

## 2021-12-23 PROCEDURE — 97112 NEUROMUSCULAR REEDUCATION: CPT

## 2022-01-10 ENCOUNTER — OFFICE VISIT (OUTPATIENT)
Dept: INTERNAL MEDICINE | Facility: CLINIC | Age: 67
End: 2022-01-10
Payer: MEDICARE

## 2022-01-10 ENCOUNTER — PATIENT MESSAGE (OUTPATIENT)
Dept: ENDOSCOPY | Facility: HOSPITAL | Age: 67
End: 2022-01-10
Payer: MEDICARE

## 2022-01-10 VITALS
OXYGEN SATURATION: 99 % | SYSTOLIC BLOOD PRESSURE: 122 MMHG | TEMPERATURE: 97 F | BODY MASS INDEX: 30.78 KG/M2 | WEIGHT: 214.5 LBS | DIASTOLIC BLOOD PRESSURE: 88 MMHG | HEART RATE: 81 BPM

## 2022-01-10 DIAGNOSIS — M67.40 GANGLION CYST: Primary | ICD-10-CM

## 2022-01-10 DIAGNOSIS — Z23 NEED FOR PNEUMOCOCCAL VACCINATION: ICD-10-CM

## 2022-01-10 PROCEDURE — 3074F PR MOST RECENT SYSTOLIC BLOOD PRESSURE < 130 MM HG: ICD-10-PCS | Mod: CPTII,S$GLB,, | Performed by: INTERNAL MEDICINE

## 2022-01-10 PROCEDURE — 90670 PCV13 VACCINE IM: CPT | Mod: S$GLB,,, | Performed by: INTERNAL MEDICINE

## 2022-01-10 PROCEDURE — 1101F PT FALLS ASSESS-DOCD LE1/YR: CPT | Mod: CPTII,S$GLB,, | Performed by: INTERNAL MEDICINE

## 2022-01-10 PROCEDURE — 99999 PR PBB SHADOW E&M-EST. PATIENT-LVL IV: CPT | Mod: PBBFAC,,, | Performed by: INTERNAL MEDICINE

## 2022-01-10 PROCEDURE — 99213 OFFICE O/P EST LOW 20 MIN: CPT | Mod: 25,S$GLB,, | Performed by: INTERNAL MEDICINE

## 2022-01-10 PROCEDURE — 1101F PR PT FALLS ASSESS DOC 0-1 FALLS W/OUT INJ PAST YR: ICD-10-PCS | Mod: CPTII,S$GLB,, | Performed by: INTERNAL MEDICINE

## 2022-01-10 PROCEDURE — 3079F PR MOST RECENT DIASTOLIC BLOOD PRESSURE 80-89 MM HG: ICD-10-PCS | Mod: CPTII,S$GLB,, | Performed by: INTERNAL MEDICINE

## 2022-01-10 PROCEDURE — 1126F AMNT PAIN NOTED NONE PRSNT: CPT | Mod: CPTII,S$GLB,, | Performed by: INTERNAL MEDICINE

## 2022-01-10 PROCEDURE — 3074F SYST BP LT 130 MM HG: CPT | Mod: CPTII,S$GLB,, | Performed by: INTERNAL MEDICINE

## 2022-01-10 PROCEDURE — G0009 ADMIN PNEUMOCOCCAL VACCINE: HCPCS | Mod: S$GLB,,, | Performed by: INTERNAL MEDICINE

## 2022-01-10 PROCEDURE — G0009 PNEUMOCOCCAL CONJUGATE VACCINE 13-VALENT LESS THAN 5YO & GREATER THAN: ICD-10-PCS | Mod: S$GLB,,, | Performed by: INTERNAL MEDICINE

## 2022-01-10 PROCEDURE — 90670 PNEUMOCOCCAL CONJUGATE VACCINE 13-VALENT LESS THAN 5YO & GREATER THAN: ICD-10-PCS | Mod: S$GLB,,, | Performed by: INTERNAL MEDICINE

## 2022-01-10 PROCEDURE — 1159F MED LIST DOCD IN RCRD: CPT | Mod: CPTII,S$GLB,, | Performed by: INTERNAL MEDICINE

## 2022-01-10 PROCEDURE — 3288F PR FALLS RISK ASSESSMENT DOCUMENTED: ICD-10-PCS | Mod: CPTII,S$GLB,, | Performed by: INTERNAL MEDICINE

## 2022-01-10 PROCEDURE — 3008F PR BODY MASS INDEX (BMI) DOCUMENTED: ICD-10-PCS | Mod: CPTII,S$GLB,, | Performed by: INTERNAL MEDICINE

## 2022-01-10 PROCEDURE — 3008F BODY MASS INDEX DOCD: CPT | Mod: CPTII,S$GLB,, | Performed by: INTERNAL MEDICINE

## 2022-01-10 PROCEDURE — 1159F PR MEDICATION LIST DOCUMENTED IN MEDICAL RECORD: ICD-10-PCS | Mod: CPTII,S$GLB,, | Performed by: INTERNAL MEDICINE

## 2022-01-10 PROCEDURE — 1126F PR PAIN SEVERITY QUANTIFIED, NO PAIN PRESENT: ICD-10-PCS | Mod: CPTII,S$GLB,, | Performed by: INTERNAL MEDICINE

## 2022-01-10 PROCEDURE — 3079F DIAST BP 80-89 MM HG: CPT | Mod: CPTII,S$GLB,, | Performed by: INTERNAL MEDICINE

## 2022-01-10 PROCEDURE — 99213 PR OFFICE/OUTPT VISIT, EST, LEVL III, 20-29 MIN: ICD-10-PCS | Mod: 25,S$GLB,, | Performed by: INTERNAL MEDICINE

## 2022-01-10 PROCEDURE — 99999 PR PBB SHADOW E&M-EST. PATIENT-LVL IV: ICD-10-PCS | Mod: PBBFAC,,, | Performed by: INTERNAL MEDICINE

## 2022-01-10 PROCEDURE — 3288F FALL RISK ASSESSMENT DOCD: CPT | Mod: CPTII,S$GLB,, | Performed by: INTERNAL MEDICINE

## 2022-01-10 NOTE — PROGRESS NOTES
Subjective:      Patient ID: Kike Smith is a 66 y.o. male.    Chief Complaint: Lump    HPI     65 yo with   Patient Active Problem List   Diagnosis    Hypertension    Epididymal cyst    Hyperlipidemia    Renal cyst    Malignant neoplasm of prostate    IPMN (intraductal papillary mucinous neoplasm)    Long term (current) use of antithrombotics/antiplatelets    Allergies    Overweight    Abnormal EKG    Snoring    Edema    Erectile dysfunction following radical prostatectomy    Decreased strength, endurance, and mobility    Urine incontinence     Past Medical History:   Diagnosis Date    Allergy     BPH (benign prostatic hyperplasia)     History of colon polyps 1/25/2018    He has had colon polyps since he was in his 30's.  He has had multiple colonoscopies and has had adenomatous polyps.    History of fracture 9/14/2021    History of inguinal hernia repair 9/14/2021    Hyperlipidemia     Hypertension     Inguinal hernia     IPMN (intraductal papillary mucinous neoplasm)     annual eus or mri    Obesity     Prostate cancer     Squamous cell carcinoma skin of arm      r arm;s/p mohs     Here today c/o pump to volar aspect for right 5th PIP. Preset for several days. No specific trauma. Waxes and wanes but does not resolve. No warmth. There is some TTP.     Review of Systems   Constitutional: Negative for chills and fever.   Skin: Negative for rash and wound.   Movable firm BB size nodule to the volar aspect of the PIP of the right 5th finger.  Good range of motion at the joint.  Mild tenderness.  No redness or warmth.  No fluctuance.    Objective:   /88 (BP Location: Left arm, Patient Position: Sitting, BP Method: Large (Manual))   Pulse 81   Temp 96.6 °F (35.9 °C) (Tympanic)   Wt 97.3 kg (214 lb 8.1 oz)   SpO2 99%   BMI 30.78 kg/m²     Physical Exam    Assessment:     1. Ganglion cyst    2. Need for pneumococcal vaccination      Plan:   Ganglion cyst  Notify me or see ortho if  desire removal.   otc meds prn pain.   Need for pneumococcal vaccination  -     (In Office Administered) Pneumococcal Conjugate Vaccine (13 Valent) (IM)        Lab Frequency Next Occurrence   Comprehensive Metabolic Panel Once 06/28/2022   PSA, Screening Once 06/28/2022   Lipid Panel Once 06/28/2022   Creatinine, serum Once 07/22/2021   Ambulatory referral/consult to Sleep Disorders Once 09/21/2021   Ambulatory referral/consult to Urology Once 10/28/2021   Prostate Specific Antigen, Diagnostic Once 03/04/2022       Problem List Items Addressed This Visit    None     Visit Diagnoses     Ganglion cyst    -  Primary    Need for pneumococcal vaccination        Relevant Orders    (In Office Administered) Pneumococcal Conjugate Vaccine (13 Valent) (IM)          Follow up if symptoms worsen or fail to improve.

## 2022-01-13 ENCOUNTER — OFFICE VISIT (OUTPATIENT)
Dept: GASTROENTEROLOGY | Facility: CLINIC | Age: 67
End: 2022-01-13
Payer: MEDICARE

## 2022-01-13 DIAGNOSIS — D49.0 IPMN (INTRADUCTAL PAPILLARY MUCINOUS NEOPLASM): Primary | ICD-10-CM

## 2022-01-13 DIAGNOSIS — K86.2 PANCREATIC CYST: ICD-10-CM

## 2022-01-13 PROCEDURE — 99214 PR OFFICE/OUTPT VISIT, EST, LEVL IV, 30-39 MIN: ICD-10-PCS | Mod: 95,,, | Performed by: INTERNAL MEDICINE

## 2022-01-13 PROCEDURE — 99214 OFFICE O/P EST MOD 30 MIN: CPT | Mod: 95,,, | Performed by: INTERNAL MEDICINE

## 2022-01-13 NOTE — PROGRESS NOTES
The patient location is: Home  The chief complaint leading to consultation is: Pancreatic cyst    Visit type: audiovisual    Face to Face time with patient: 25 minutes of total time spent on the encounter, which includes face to face time and non-face to face time preparing to see the patient (eg, review of tests), Obtaining and/or reviewing separately obtained history, Documenting clinical information in the electronic or other health record, Independently interpreting results (not separately reported) and communicating results to the patient/family/caregiver, or Care coordination (not separately reported).         Each patient to whom he or she provides medical services by telemedicine is:  (1) informed of the relationship between the physician and patient and the respective role of any other health care provider with respect to management of the patient; and (2) notified that he or she may decline to receive medical services by telemedicine and may withdraw from such care at any time.    Notes:    Clinic Consult:  [facility] Consultation Note    Reason for Consult:  The primary encounter diagnosis was IPMN (intraductal papillary mucinous neoplasm). A diagnosis of Pancreatic cyst was also pertinent to this visit.    PCP: Anmol Reyna       HPI:  Mr. Smith is a 66 year old male referred for pancreatic cyst. He was on with his wife during the virtual visit.    Mr. Smith underwent robotic assisted laparoscopic prostatectomy on 9/20/2021. CT abdomen on 7/30/2021 showed a 6 mm pancreatic cyst in the body was seen. He denied any prior knowledge of prior pancreatic cysts. This was noted on CT abdomen in 2019.     Mr. Villaseñor has no abdominal pain, nausea, emesis, jaundice, weight loss, change in appetite, change in bowel habits.     GI history:  Colonoscopy: 2018 with a polyp removed and repeat recommended in 5 years.   Family history: No pancreatic cancer family history, Maternal Grandmother colon cancer,  brother had urothelial cancer.    ROS:  CONSTITUTIONAL: Denies weight change,  fatigue, fevers, chills, night sweats.  EYES: No changes in vision.   ENT: No oral lesions or sore throat.  HEMATOLOGICAL/Lymph: Denies bleeding tendency, bruising tendency. No swellings or enlarged lymph nodes.  CARDIOVASCULAR: Denies chest pain, shortness of breath, orthopnea and edema.  RESPIRATORY: Denies cough, hemoptysis, dyspnea, and wheezing.  GI: See HPI.  : Denies dysuria and hematuria  MUSCULOSKELETAL: Denies joint pain or swelling, back pain and muscle pain.  SKIN: Denies rashes.  NEUROLOGIC: Denies headaches, seizures and numbness.  PSYCHIATRIC: Denies depression or anxiety.  ENDOCRINE: Denies heat or cold intolerance and excessive thirst or urination.    Medical History:   Past Medical History:   Diagnosis Date    Allergy     BPH (benign prostatic hyperplasia)     History of colon polyps 1/25/2018    He has had colon polyps since he was in his 30's.  He has had multiple colonoscopies and has had adenomatous polyps.    History of fracture 9/14/2021    History of inguinal hernia repair 9/14/2021    Hyperlipidemia     Hypertension     Inguinal hernia     IPMN (intraductal papillary mucinous neoplasm)     annual eus or mri    Obesity     Prostate cancer     Squamous cell carcinoma skin of arm      r arm;s/p mohs       Surgical History:  Past Surgical History:   Procedure Laterality Date    CLOSURE OF DEFECT OF MOHS PROCEDURE  07/10/2020    dr reynoso    COLONOSCOPY N/A 1/25/2018    Procedure: COLONOSCOPY;  Surgeon: Juan A Walter MD;  Location: [facility];  Service: Endoscopy;  Laterality: N/A;    KNEE ARTHROSCOPY Left     ORIF TIBIA & FIBULA FRACTURES Left 08/10/2019    tib/fib fx    ROBOT-ASSISTED LAPAROSCOPIC PROSTATECTOMY USING DA ALEX XI N/A 9/20/2021    Procedure: XI ROBOTIC PROSTATECTOMY;  Surgeon: Fidencio Morton MD;  Location: [address];  Service: Urology;  Laterality: N/A;  3hr gen with  regional    ROBOT-ASSISTED LAPAROSCOPIC REPAIR OF INGUINAL HERNIA USING DA ALEX XI Left 8/14/2020    Procedure: XI ROBOTIC REPAIR, HERNIA, INGUINAL;  Surgeon: Mark Anthony Araujo MD;  Location[facility];  Service: General;  Laterality: Left;    ROBOT-ASSISTED LYMPHADENECTOMY N/A 9/20/2021    Procedure: ROBOTIC LYMPHADENECTOMY;  Surgeon: Fidencio Morton MD;  [facility];  Service: Urology;  Laterality: N/A;       Family History:   Family History   Problem Relation Age of Onset    Macular degeneration Maternal Uncle     Bladder Cancer Brother         smoker    Cataracts Mother     Fanconi anemia Mother     Leukemia Father 80    Coronary artery disease Father     Heart attack Father 72    Multiple myeloma Sister 59       Social History:   Social History     Tobacco Use    Smoking status: Never Smoker    Smokeless tobacco: Never Used   Substance Use Topics    Alcohol use: No     Comment: quit 1983    Drug use: Never       Allergies: Reviewed    Home Medications:   Medication List with Changes/Refills   Current Medications    AMLODIPINE (NORVASC) 5 MG TABLET    Take 1 tablet (5 mg total) by mouth once daily.    ASPIRIN (ECOTRIN) 81 MG EC TABLET    Take 81 mg by mouth once daily.    AZELASTINE (ASTELIN) 137 MCG (0.1 %) NASAL SPRAY    1 spray by Nasal route 2 (two) times daily.     CIPROFLOXACIN-DEXAMETHASONE 0.3-0.1% (CIPRODEX) 0.3-0.1 % DRPS        DIAZEPAM (VALIUM) 5 MG TABLET    Take 1 tablet (5 mg total) by mouth once. for 1 dose    EPINEPHRINE (EPIPEN) 0.3 MG/0.3 ML ATIN    Auvi-Q 0.3 mg/0.3 mL injection, auto-injector   Take 1 auto as needed by injection route as directed.    FLUOCINOLONE ACETONIDE OIL 0.01 % DROP        FLUTICASONE PROPIONATE (FLONASE) 50 MCG/ACTUATION NASAL SPRAY    2 sprays by Each Nostril route once daily.    IBUPROFEN (ADVIL,MOTRIN) 800 MG TABLET    Take 1 tablet (800 mg total) by mouth 3 (three) times daily.    LACTOBACILLUS ACIDOPHILUS (PROBIOTIC) 10 BILLION CELL CAP     Take 1 tablet by mouth once daily.     NAPROXEN SODIUM (ANAPROX) 220 MG TABLET    Take 220 mg by mouth daily as needed.    OXYCODONE (ROXICODONE) 5 MG IMMEDIATE RELEASE TABLET    Take 1 tablet (5 mg total) by mouth every 6 (six) hours as needed for Pain.    POLYETHYLENE GLYCOL (GLYCOLAX) 17 GRAM/DOSE POWDER    Take 17 g by mouth once daily.    PRAVASTATIN (PRAVACHOL) 40 MG TABLET    TAKE 1 TABLET BY MOUTH EVERY DAY    TADALAFIL (CIALIS) 5 MG TABLET    Take 1 tablet (5 mg total) by mouth daily as needed for Erectile Dysfunction.    VITAMIN D (VITAMIN D3) 1000 UNITS TAB    Take 1,000 Units by mouth once daily.         Physical Exam:  Vital Signs:  There were no vitals taken for this visit.  There is no height or weight on file to calculate BMI.      GENERAL: No acute distress, A&Ox3  Labs: Pertinent labs reviewed.    Assessment and Plan:  IPMN (intraductal papillary mucinous neoplasm)    Pancreatic cyst    For his pancreatic cyst most likely side branch IPMN, EUS was recommended to evaluate and if no concerning signs seen then repeat imaging for surviellance will be proceeded with. This will including CT abdomen pancreas protocol or MRCP.    He is scheduled for EUS with me on 1/24/2022.    Endoscopic ultrasound with possible fine needle aspiration/biopsy was recommended. The procedure was described along with the risks and benefits. Risks include perforation (0.02%), pancreatitis (0-2%), bleeding (4%), sedation related adverse events. Other risks include, risks of adverse reaction to sedation requiring the use of reversal agents, bleeding requiring blood transfusion, perforation requiring surgical intervention, technical failure, aspiration leading to respiratory distress and respiratory failure resulting in endotracheal intubation and mechanical ventilation including death. Anesthesia is utilized for this procedure, it is up to the anesthesiologist to determine airway safety including elective endotracheal  intubation. Questions were answered, the patient agreed to proceed. There were no language barriers.      No follow-ups on file.        Thank you so much for allowing me to participate in the care of Kike Espinal MD  Gastroenterology  Director of Advanced Endoscopy at [facility]

## 2022-01-13 NOTE — H&P (VIEW-ONLY)
The patient location is: Home  The chief complaint leading to consultation is: Pancreatic cyst    Visit type: audiovisual    Face to Face time with patient: 25 minutes of total time spent on the encounter, which includes face to face time and non-face to face time preparing to see the patient (eg, review of tests), Obtaining and/or reviewing separately obtained history, Documenting clinical information in the electronic or other health record, Independently interpreting results (not separately reported) and communicating results to the patient/family/caregiver, or Care coordination (not separately reported).         Each patient to whom he or she provides medical services by telemedicine is:  (1) informed of the relationship between the physician and patient and the respective role of any other health care provider with respect to management of the patient; and (2) notified that he or she may decline to receive medical services by telemedicine and may withdraw from such care at any time.    Notes:    Clinic Consult:  [facility] Consultation Note    Reason for Consult:  The primary encounter diagnosis was IPMN (intraductal papillary mucinous neoplasm). A diagnosis of Pancreatic cyst was also pertinent to this visit.    PCP: Anmol Reyna       HPI:  Mr. Smith is a 66 year old male referred for pancreatic cyst. He was on with his wife during the virtual visit.    Mr. Smith underwent robotic assisted laparoscopic prostatectomy on 9/20/2021. CT abdomen on 7/30/2021 showed a 6 mm pancreatic cyst in the body was seen. He denied any prior knowledge of prior pancreatic cysts. This was noted on CT abdomen in 2019.     Mr. Villaseñor has no abdominal pain, nausea, emesis, jaundice, weight loss, change in appetite, change in bowel habits.     GI history:  Colonoscopy: 2018 with a polyp removed and repeat recommended in 5 years.   Family history: No pancreatic cancer family history, Maternal Grandmother colon cancer,  brother had urothelial cancer.    ROS:  CONSTITUTIONAL: Denies weight change,  fatigue, fevers, chills, night sweats.  EYES: No changes in vision.   ENT: No oral lesions or sore throat.  HEMATOLOGICAL/Lymph: Denies bleeding tendency, bruising tendency. No swellings or enlarged lymph nodes.  CARDIOVASCULAR: Denies chest pain, shortness of breath, orthopnea and edema.  RESPIRATORY: Denies cough, hemoptysis, dyspnea, and wheezing.  GI: See HPI.  : Denies dysuria and hematuria  MUSCULOSKELETAL: Denies joint pain or swelling, back pain and muscle pain.  SKIN: Denies rashes.  NEUROLOGIC: Denies headaches, seizures and numbness.  PSYCHIATRIC: Denies depression or anxiety.  ENDOCRINE: Denies heat or cold intolerance and excessive thirst or urination.    Medical History:   Past Medical History:   Diagnosis Date    Allergy     BPH (benign prostatic hyperplasia)     History of colon polyps 1/25/2018    He has had colon polyps since he was in his 30's.  He has had multiple colonoscopies and has had adenomatous polyps.    History of fracture 9/14/2021    History of inguinal hernia repair 9/14/2021    Hyperlipidemia     Hypertension     Inguinal hernia     IPMN (intraductal papillary mucinous neoplasm)     annual eus or mri    Obesity     Prostate cancer     Squamous cell carcinoma skin of arm      r arm;s/p mohs       Surgical History:  Past Surgical History:   Procedure Laterality Date    CLOSURE OF DEFECT OF MOHS PROCEDURE  07/10/2020    dr reynoso    COLONOSCOPY N/A 1/25/2018    Procedure: COLONOSCOPY;  Surgeon: Juan A Walter MD;  Location: [facility];  Service: Endoscopy;  Laterality: N/A;    KNEE ARTHROSCOPY Left     ORIF TIBIA & FIBULA FRACTURES Left 08/10/2019    tib/fib fx    ROBOT-ASSISTED LAPAROSCOPIC PROSTATECTOMY USING DA ALEX XI N/A 9/20/2021    Procedure: XI ROBOTIC PROSTATECTOMY;  Surgeon: Fidencio Morton MD;  Location: [address];  Service: Urology;  Laterality: N/A;  3hr gen with  regional    ROBOT-ASSISTED LAPAROSCOPIC REPAIR OF INGUINAL HERNIA USING DA ALEX XI Left 8/14/2020    Procedure: XI ROBOTIC REPAIR, HERNIA, INGUINAL;  Surgeon: Mark Anthony Araujo MD;  Location: [facility] OR;  Service: General;  Laterality: Left;    ROBOT-ASSISTED LYMPHADENECTOMY N/A 9/20/2021    Procedure: ROBOTIC LYMPHADENECTOMY;  Surgeon: Fidencio Morton MD;  [facility];  Service: Urology;  Laterality: N/A;       Family History:   Family History   Problem Relation Age of Onset    Macular degeneration Maternal Uncle     Bladder Cancer Brother         smoker    Cataracts Mother     Fanconi anemia Mother     Leukemia Father 80    Coronary artery disease Father     Heart attack Father 72    Multiple myeloma Sister 59       Social History:   Social History     Tobacco Use    Smoking status: Never Smoker    Smokeless tobacco: Never Used   Substance Use Topics    Alcohol use: No     Comment: quit 1983    Drug use: Never       Allergies: Reviewed    Home Medications:   Medication List with Changes/Refills   Current Medications    AMLODIPINE (NORVASC) 5 MG TABLET    Take 1 tablet (5 mg total) by mouth once daily.    ASPIRIN (ECOTRIN) 81 MG EC TABLET    Take 81 mg by mouth once daily.    AZELASTINE (ASTELIN) 137 MCG (0.1 %) NASAL SPRAY    1 spray by Nasal route 2 (two) times daily.     CIPROFLOXACIN-DEXAMETHASONE 0.3-0.1% (CIPRODEX) 0.3-0.1 % DRPS        DIAZEPAM (VALIUM) 5 MG TABLET    Take 1 tablet (5 mg total) by mouth once. for 1 dose    EPINEPHRINE (EPIPEN) 0.3 MG/0.3 ML ATIN    Auvi-Q 0.3 mg/0.3 mL injection, auto-injector   Take 1 auto as needed by injection route as directed.    FLUOCINOLONE ACETONIDE OIL 0.01 % DROP        FLUTICASONE PROPIONATE (FLONASE) 50 MCG/ACTUATION NASAL SPRAY    2 sprays by Each Nostril route once daily.    IBUPROFEN (ADVIL,MOTRIN) 800 MG TABLET    Take 1 tablet (800 mg total) by mouth 3 (three) times daily.    LACTOBACILLUS ACIDOPHILUS (PROBIOTIC) 10 BILLION CELL CAP     Take 1 tablet by mouth once daily.     NAPROXEN SODIUM (ANAPROX) 220 MG TABLET    Take 220 mg by mouth daily as needed.    OXYCODONE (ROXICODONE) 5 MG IMMEDIATE RELEASE TABLET    Take 1 tablet (5 mg total) by mouth every 6 (six) hours as needed for Pain.    POLYETHYLENE GLYCOL (GLYCOLAX) 17 GRAM/DOSE POWDER    Take 17 g by mouth once daily.    PRAVASTATIN (PRAVACHOL) 40 MG TABLET    TAKE 1 TABLET BY MOUTH EVERY DAY    TADALAFIL (CIALIS) 5 MG TABLET    Take 1 tablet (5 mg total) by mouth daily as needed for Erectile Dysfunction.    VITAMIN D (VITAMIN D3) 1000 UNITS TAB    Take 1,000 Units by mouth once daily.         Physical Exam:  Vital Signs:  There were no vitals taken for this visit.  There is no height or weight on file to calculate BMI.      GENERAL: No acute distress, A&Ox3  Labs: Pertinent labs reviewed.    Assessment and Plan:  IPMN (intraductal papillary mucinous neoplasm)    Pancreatic cyst    For his pancreatic cyst most likely side branch IPMN, EUS was recommended to evaluate and if no concerning signs seen then repeat imaging for surviellance will be proceeded with. This will including CT abdomen pancreas protocol or MRCP.    He is scheduled for EUS with me on 1/24/2022.    Endoscopic ultrasound with possible fine needle aspiration/biopsy was recommended. The procedure was described along with the risks and benefits. Risks include perforation (0.02%), pancreatitis (0-2%), bleeding (4%), sedation related adverse events. Other risks include, risks of adverse reaction to sedation requiring the use of reversal agents, bleeding requiring blood transfusion, perforation requiring surgical intervention, technical failure, aspiration leading to respiratory distress and respiratory failure resulting in endotracheal intubation and mechanical ventilation including death. Anesthesia is utilized for this procedure, it is up to the anesthesiologist to determine airway safety including elective endotracheal  intubation. Questions were answered, the patient agreed to proceed. There were no language barriers.      No follow-ups on file.        Thank you so much for allowing me to participate in the care of Kike Espinal MD  Gastroenterology  Director of Advanced Endoscopy at [facility]

## 2022-01-24 ENCOUNTER — HOSPITAL ENCOUNTER (OUTPATIENT)
Facility: HOSPITAL | Age: 67
Discharge: HOME | End: 2022-01-24
Attending: INTERNAL MEDICINE | Admitting: INTERNAL MEDICINE
Payer: MEDICARE

## 2022-01-24 ENCOUNTER — ANESTHESIA (OUTPATIENT)
Dept: ENDOSCOPY | Facility: HOSPITAL | Age: 67
End: 2022-01-24
Payer: MEDICARE

## 2022-01-24 ENCOUNTER — ANESTHESIA EVENT (OUTPATIENT)
Dept: ENDOSCOPY | Facility: HOSPITAL | Age: 67
End: 2022-01-24
Payer: MEDICARE

## 2022-01-24 LAB
CTP QC/QA: YES
SARS-COV-2 AG RESP QL IA.RAPID: NEGATIVE

## 2022-01-24 PROCEDURE — 43237 ENDOSCOPIC US EXAM ESOPH: CPT | Mod: ,,, | Performed by: INTERNAL MEDICINE

## 2022-01-24 PROCEDURE — 43239 EGD BIOPSY SINGLE/MULTIPLE: CPT | Mod: 59,,, | Performed by: INTERNAL MEDICINE

## 2022-01-24 PROCEDURE — 37000009 HC ANESTHESIA EA ADD 15 MINS: Performed by: INTERNAL MEDICINE

## 2022-01-24 PROCEDURE — 88305 TISSUE EXAM BY PATHOLOGIST: CPT | Performed by: PATHOLOGY

## 2022-01-24 PROCEDURE — 63600175 PHARM REV CODE 636 W HCPCS: Performed by: NURSE ANESTHETIST, CERTIFIED REGISTERED

## 2022-01-24 PROCEDURE — 00813 ANES UPR LWR GI NDSC PX: CPT | Performed by: INTERNAL MEDICINE

## 2022-01-24 PROCEDURE — 43239 EGD BIOPSY SINGLE/MULTIPLE: CPT | Performed by: INTERNAL MEDICINE

## 2022-01-24 PROCEDURE — 88305 TISSUE EXAM BY PATHOLOGIST: ICD-10-PCS | Mod: 26,,, | Performed by: PATHOLOGY

## 2022-01-24 PROCEDURE — 88305 TISSUE EXAM BY PATHOLOGIST: CPT | Mod: 26,,, | Performed by: PATHOLOGY

## 2022-01-24 PROCEDURE — 43237 PR ENDOSCOPIC US EXAM, ESOPH: ICD-10-PCS | Mod: ,,, | Performed by: INTERNAL MEDICINE

## 2022-01-24 PROCEDURE — 25000003 PHARM REV CODE 250: Performed by: NURSE ANESTHETIST, CERTIFIED REGISTERED

## 2022-01-24 PROCEDURE — 43239 PR EGD, FLEX, W/BIOPSY, SGL/MULTI: ICD-10-PCS | Mod: 59,,, | Performed by: INTERNAL MEDICINE

## 2022-01-24 PROCEDURE — 43237 ENDOSCOPIC US EXAM ESOPH: CPT | Performed by: INTERNAL MEDICINE

## 2022-01-24 PROCEDURE — 27201012 HC FORCEPS, HOT/COLD, DISP: Performed by: INTERNAL MEDICINE

## 2022-01-24 PROCEDURE — 37000008 HC ANESTHESIA 1ST 15 MINUTES: Performed by: INTERNAL MEDICINE

## 2022-01-24 RX ORDER — SODIUM CHLORIDE, SODIUM LACTATE, POTASSIUM CHLORIDE, CALCIUM CHLORIDE 600; 310; 30; 20 MG/100ML; MG/100ML; MG/100ML; MG/100ML
INJECTION, SOLUTION INTRAVENOUS CONTINUOUS PRN
Status: DISCONTINUED | OUTPATIENT
Start: 2022-01-24 | End: 2022-01-24

## 2022-01-24 RX ORDER — PROPOFOL 10 MG/ML
VIAL (ML) INTRAVENOUS
Status: DISCONTINUED | OUTPATIENT
Start: 2022-01-24 | End: 2022-01-24

## 2022-01-24 RX ORDER — LIDOCAINE HYDROCHLORIDE 10 MG/ML
INJECTION, SOLUTION EPIDURAL; INFILTRATION; INTRACAUDAL; PERINEURAL
Status: DISCONTINUED | OUTPATIENT
Start: 2022-01-24 | End: 2022-01-24

## 2022-01-24 RX ADMIN — PROPOFOL 40 MG: 10 INJECTION, EMULSION INTRAVENOUS at 08:01

## 2022-01-24 RX ADMIN — PROPOFOL 30 MG: 10 INJECTION, EMULSION INTRAVENOUS at 08:01

## 2022-01-24 RX ADMIN — SODIUM CHLORIDE, SODIUM LACTATE, POTASSIUM CHLORIDE, AND CALCIUM CHLORIDE: 600; 310; 30; 20 INJECTION, SOLUTION INTRAVENOUS at 08:01

## 2022-01-24 RX ADMIN — PROPOFOL 20 MG: 10 INJECTION, EMULSION INTRAVENOUS at 09:01

## 2022-01-24 RX ADMIN — PROPOFOL 80 MG: 10 INJECTION, EMULSION INTRAVENOUS at 08:01

## 2022-01-24 RX ADMIN — PROPOFOL 40 MG: 10 INJECTION, EMULSION INTRAVENOUS at 09:01

## 2022-01-24 RX ADMIN — LIDOCAINE HYDROCHLORIDE 50 MG: 10 INJECTION, SOLUTION EPIDURAL; INFILTRATION; INTRACAUDAL; PERINEURAL at 08:01

## 2022-01-24 NOTE — ANESTHESIA RELEASE NOTE
"Anesthesia Release from PACU Note    Patient: Kike Smith    Procedure(s) Performed: Procedure(s) (LRB):  ULTRASOUND, ENDOSCOPIC, UPPER GI TRACT (N/A)    Anesthesia type: MAC    Post pain: Adequate analgesia    Post assessment: no apparent anesthetic complications and tolerated procedure well    Last Vitals:   Visit Vitals  /75 (BP Location: Left arm, Patient Position: Lying)   Pulse 76   Temp 36.1 °C (97 °F) (Temporal)   Resp 16   Ht 5' 10" (1.778 m)   Wt 93 kg (205 lb)   SpO2 (!) 91%   BMI 29.41 kg/m²       Post vital signs: stable    Level of consciousness: awake, alert  and oriented    Nausea/Vomiting: no nausea/no vomiting    Complications: none    Airway Patency: patent    Respiratory: unassisted, spontaneous ventilation, room air    Cardiovascular: stable and blood pressure at baseline    Hydration: euvolemic  "

## 2022-01-24 NOTE — TRANSFER OF CARE
"Anesthesia Transfer of Care Note    Patient: Kike Smith    Procedure(s) Performed: Procedure(s) (LRB):  ULTRASOUND, ENDOSCOPIC, UPPER GI TRACT (N/A)    Patient location: GI    Anesthesia Type: MAC    Transport from OR: Transported from OR on room air with adequate spontaneous ventilation    Post pain: adequate analgesia    Post assessment: no apparent anesthetic complications and tolerated procedure well    Post vital signs: stable    Level of consciousness: awake, alert and oriented    Nausea/Vomiting: no nausea/vomiting    Complications: none    Transfer of care protocol was followed      Last vitals:   Visit Vitals  /75 (BP Location: Left arm, Patient Position: Lying)   Pulse 76   Temp 36.1 °C (97 °F) (Temporal)   Resp 16   Ht 5' 10" (1.778 m)   Wt 93 kg (205 lb)   SpO2 (!) 91%   BMI 29.41 kg/m²     "

## 2022-01-24 NOTE — ANESTHESIA PREPROCEDURE EVALUATION
01/24/2022  Kike Smith is a 66 y.o., male.    Anesthesia Evaluation    I have reviewed the Patient Summary Reports.    I have reviewed the Nursing Notes. I have reviewed the NPO Status.   I have reviewed the Medications.     Review of Systems  Anesthesia Hx:  No problems with previous Anesthesia    Social:  Non-Smoker, No Alcohol Use    Hematology/Oncology:  Hematology Normal       -- Cancer in past history:  Other (see Oncology comments) surgery  Oncology Comments: prostate     EENT/Dental:EENT/Dental Normal   Cardiovascular:   Hypertension, well controlled ECG has been reviewed. Sinus rhythm with Premature atrial complexes with Aberrant conduction   Otherwise normal ECG   When compared with ECG of 11-AUG-2020 08:47,   Aberrant conduction is now Present   Confirmed by Ryan Warner MD ([id number]) on 9/14/2021 4:27:28 PM   Pulmonary:  Pulmonary Normal    Renal/:  Renal/ Normal  Kidney cyst   Hepatic/GI:   GERD, well controlled    Musculoskeletal:  Musculoskeletal Normal    Neurological:  Neurology Normal    Endocrine:  Endocrine Normal    Dermatological:  Skin Normal    Psych:  Psychiatric Normal           Physical Exam  General:  Well nourished    Airway/Jaw/Neck:  Airway Findings: Mouth Opening: Normal Tongue: Normal  General Airway Assessment: Adult  Mallampati: II  TM Distance: Normal, at least 6 cm  Jaw/Neck Findings:  Neck ROM: Normal ROM      Dental:  Dental Findings: In tact   Chest/Lungs:  Chest/Lungs Findings: Normal Respiratory Rate     Heart/Vascular:  Heart Findings: Rate: Normal        Mental Status:  Mental Status Findings:  Cooperative, Alert and Oriented         Anesthesia Plan  Type of Anesthesia, risks & benefits discussed:  Anesthesia Type:  MAC    Patient's Preference:   Plan Factors:          Intra-op Monitoring Plan: standard ASA monitors  Intra-op Monitoring Plan Comments:   Post Op  Pain Control Plan:   Post Op Pain Control Plan Comments:     Induction:   IV  Beta Blocker:  Patient is not currently on a Beta-Blocker (No further documentation required).       Informed Consent: Patient understands risks and agrees with Anesthesia plan.  Questions answered. Anesthesia consent signed with patient.  ASA Score: 2     Day of Surgery Review of History & Physical: I have interviewed and examined the patient. I have reviewed the patient's H&P dated:  There are no significant changes.          Ready For Surgery From Anesthesia Perspective.

## 2022-01-24 NOTE — ANESTHESIA POSTPROCEDURE EVALUATION
Anesthesia Post Evaluation    Patient: Kike Smith    Procedure(s) Performed: Procedure(s) (LRB):  ULTRASOUND, ENDOSCOPIC, UPPER GI TRACT (N/A)    Final Anesthesia Type: MAC      Patient location during evaluation: GI PACU  Patient participation: Yes- Able to Participate  Level of consciousness: awake and alert and oriented  Post-procedure vital signs: reviewed and stable  Pain management: adequate  Airway patency: patent  MERRITT mitigation strategies: Multimodal analgesia  PONV status at discharge: No PONV  Anesthetic complications: no      Cardiovascular status: hemodynamically stable  Respiratory status: unassisted, spontaneous ventilation and room air  Hydration status: euvolemic  Follow-up not needed.          Vitals Value Taken Time   /75 01/24/22 0905   Temp 36.1 °C (97 °F) 01/24/22 0905   Pulse 76 01/24/22 0905   Resp 16 01/24/22 0905   SpO2 91 % 01/24/22 0905         No case tracking events are documented in the log.      Pain/Renée Score: Renée Score: 9 (1/24/2022  9:05 AM)

## 2022-01-24 NOTE — PROVATION PATIENT INSTRUCTIONS
Discharge Summary/Instructions after an Endoscopic Procedure  Patient Name: Kike Smith  Patient MRN: [id number]  Patient YOB: 1955  Monday, January 24, 2022 Daya Espinal MD  Dear patient,  As a result of recent federal legislation (The Federal Cures Act), you may   receive lab or pathology results from your procedure in your [facility]   account before your physician is able to contact you. Your physician or   their representative will relay the results to you with their   recommendations at their soonest availability.  Thank you,  RESTRICTIONS:  During your procedure today, you received medications for sedation.  These   medications may affect your judgment, balance and coordination.  Therefore,   for 24 hours, you have the following restrictions:   - DO NOT drive a car, operate machinery, make legal/financial decisions,   sign important papers or drink alcohol.    ACTIVITY:  Today: no heavy lifting, straining or running due to procedural   sedation/anesthesia.  The following day: return to full activity including work.  DIET:  Eat and drink normally unless instructed otherwise.     TREATMENT FOR COMMON SIDE EFFECTS:  - Mild abdominal pain, nausea, belching, bloating or excessive gas:  rest,   eat lightly and use a heating pad.  - Sore Throat: treat with throat lozenges and/or gargle with warm salt   water.  - Because air was used during the procedure, expelling large amounts of air   from your rectum or belching is normal.  - If a bowel prep was taken, you may not have a bowel movement for 1-3 days.    This is normal.  SYMPTOMS TO WATCH FOR AND REPORT TO YOUR PHYSICIAN:  1. Abdominal pain or bloating, other than gas cramps.  2. Chest pain.  3. Back pain.  4. Signs of infection such as: chills or fever occurring within 24 hours   after the procedure.  5. Rectal bleeding, which would show as bright red, maroon, or black stools.   (A tablespoon of blood from the rectum is not serious,  especially if   hemorrhoids are present.)  6. Vomiting.  7. Weakness or dizziness.  GO DIRECTLY TO THE NEAREST EMERGENCY ROOM IF YOU HAVE ANY OF THE FOLLOWING:      Difficulty breathing              Chills and/or fever over 101 F   Persistent vomiting and/or vomiting blood   Severe abdominal pain   Severe chest pain   Black, tarry stools   Bleeding- more than one tablespoon   Any other symptom or condition that you feel may need urgent attention  Your doctor recommends these additional instructions:  If any biopsies were taken, your doctors clinic will contact you in 1 to 2   weeks with any results.  - Discharge patient to home.   - Resume previous diet.   - Continue present medications.   - Await pathology results.   - For the pancreatic cyst a repeat CT abdomen pancreas protocol in 1 year is   recommended.  For questions, problems or results please call your physician Daya Espinal MD at Work:  [phone]  If you have any questions about the above instructions, call the GI   department at [phone] or call the endoscopy unit at [phone]   from 7am until 3 pm.  [facility] [place], EMERGENCY ROOM PHONE NUMBER:   [phone]  IF A COMPLICATION OR EMERGENCY SITUATION ARISES AND YOU ARE UNABLE TO REACH   YOUR PHYSICIAN - GO DIRECTLY TO THE EMERGENCY ROOM.  I have read or have had read to me these discharge instructions for my   procedure and have received a written copy.  I understand these   instructions and will follow-up with my physician if I have any questions.     __________________________________       _____________________________________  Nurse Signature                                          Patient/Designated   Responsible Party Signature  MD Daya Vieyra MD  1/24/2022 9:38:17 AM  This report has been verified and signed electronically.  Dear patient,  As a result of recent federal legislation (The Federal Cures Act),  you may   receive lab or pathology results from your procedure in your [facility]   account before your physician is able to contact you. Your physician or   their representative will relay the results to you with their   recommendations at their soonest availability.  Thank you,  PROVATION

## 2022-01-24 NOTE — DISCHARGE INSTRUCTIONS
Patient Education       Upper GI Endoscopy Discharge Instructions   About this topic   This procedure is done to view your upper gastrointestinal (GI) tract. This includes your throat and food pipe (esophagus). It also includes your stomach and the first part of the small bowel. Some people have this test for problems like coughing or throwing up blood. Other people may be having bad belly pain or blood in their stool. You may be having trouble swallowing or problems with acid reflux.  Doctors often use this test to look for problems like:  · Ulcers  · Cancer or tumor growths  · Internal bleeding  · Swelling  · Inflammation  · Infection  · Schilling's esophagus  · Gastroesophageal reflux disease or GERD  · Swallowing problems     What care is needed at home?   · Ask your doctor what you need to do when you go home. Make sure you ask questions if you do not understand what the doctor says. This way you will know what you need to do.  · Your throat may feel sore. Your belly may also feel bloated. Your doctor may give you drugs to help with pain.  · Talk to your doctor about when it is safe for you to go back to eating your normal diet and taking your drugs. You can drink fluid once the numbing drugs in your throat wear off.  What follow-up care is needed?   · Your doctor may ask you to make visits to the office to check on your progress. Be sure to keep these visits.  · The results of this test may help your doctor understand what kind of problem you have with your upper GI tract. Together you can make a plan for more care.  What drugs may be needed?   The doctor may order drugs to:  · Help with pain  · Decrease the acid in your stomach  Will physical activity be limited?   You may need to limit your activity for the rest of the day. After that, you will likely be able to go back to your normal activities.  What changes to diet are needed?   Soft foods like pudding or soups may be easier to eat at first. Ask your doctor  if you need to make any changes to your diet.  What problems could happen?   · Painful swallowing  · Upset stomach  · Injury to food pipe   · Throwing up  · Tear in the esophagus  When do I need to call the doctor?   · Signs of infection. These include a fever of 100.4°F (38°C) or higher, chills.  · Very bad belly pain  · Throwing up blood  · Your belly is hard and swollen  · Trouble swallowing or breathing  · Upset stomach and throwing up  · Bloody or black tarry stools  · Cough, shortness of breath, or chest pain  · A crunching feeling under the skin in your neck  · You are not feeling better in 2 to 3 days or you are feeling worse  Teach Back: Helping You Understand   The Teach Back Method helps you understand the information we are giving you. After you talk with the staff, tell them in your own words what you learned. This helps to make sure the staff has described each thing clearly. It also helps to explain things that may have been confusing. Before going home, make sure you can do these:  · I can tell you about my procedure.  · I can tell you what changes I need to make with my diet or drugs.  · I can tell you what I will do if I have very bad belly pain, throwing up blood, or my belly is hard and swollen.  Where can I learn more?   [employer] Association  https://www.gastro.org/practice-guidance/gi-patient-center/topic/upper-gi-endoscopy   Last Reviewed Date   2021-10-05  Consumer Information Use and Disclaimer   This information is not specific medical advice and does not replace information you receive from your health care provider. This is only a brief summary of general information. It does NOT include all information about conditions, illnesses, injuries, tests, procedures, treatments, therapies, discharge instructions or life-style choices that may apply to you. You must talk with your health care provider for complete information about your health and treatment options. This  information should not be used to decide whether or not to accept your health care providers advice, instructions or recommendations. Only your health care provider has the knowledge and training to provide advice that is right for you.  Copyright   Copyright © 2021 UpToDate, Inc. and its affiliates and/or licensors. All rights reserved.  Patient Education       Endoscopic Ultrasound   Why is this procedure done?   An endoscopic ultrasound is a procedure done to look at the linings of the digestive tract, gallbladder, and pancreas. The digestive tract includes the esophagus, stomach, and the small and large intestines. This ultrasound uses sound waves to make pictures of the digestive tract. This test is done to look for:  · Abnormal growths  · Gallstones  · Irritation of an area  · Blood clots  · Evaluate stages of cancer     What will the results be?   A special doctor will look at the pictures to see if there is anything wrong. Your doctor will get the results and talk about them with you.  What happens before the procedure?   · Your doctor will take your history and do an exam.  · Talk to your doctor about:  ? All the drugs you are taking. Be sure to include all prescription, over-the-counter (OTC) drugs, and herbal supplements. Tell the doctor about any drug allergy. Bring a list of drugs you take with you.  ? Any bleeding problems. Be sure to tell your doctor if you are taking any drugs that may cause bleeding. Some of these are warfarin, rivaroxaban, apixaban, ticagrelor, clopidogrel, ketorolac, ibuprofen, naproxen, or aspirin. Certain vitamins and herbs, such as garlic and fish oil, may also add to the risk for bleeding. You may need to stop these drugs as well. Talk to your doctor about them.  · Follow your doctor's orders about eating or drinking before the test. You will be asked to stop eating or drinking a certain number of hours before the test.  · Your doctor may order a process to empty your bowel  before the test.  · You will not be allowed to drive right away after the procedure. Ask a family member or a friend to drive you home.  What happens during the procedure?   · Once you are in the procedure room, the staff will put an IV in your arm to give you fluids and drugs if needed.  · The doctor will give you drugs to help you relax and stay pain free.  · The staff will help you lie on your left side.  ? For an upper endoscope:  § The doctor will spray a drug in your throat to numb the area. You may get oxygen to help you breathe.  § The staff may place a bite-block in your mouth to keep it open during the procedure.  § The doctor will put a thin tube in your mouth and down your esophagus.  ? For a lower endoscope:  § The doctor may place the scope in your lower digestive tract through the anus.  · When the scope is in position, your doctor will look around and take pictures.  · The doctor will slide small surgical tools through the endoscope if taking a sample.  · This procedure may take 30 to 45 minutes.  What happens after the procedure?   · You will be moved to a Recovery Room after the procedure. This will give you time to wake up.  · You may feel bloated after the procedure.  · If you had a biopsy, the sample will be sent to the lab for testing. Ask your doctor when you can get the results.  · Your doctor will tell you when you can go home after the procedure.  What care is needed at home?   · Ask your doctor what you need to do when you go home. Make sure you ask questions if you do not understand what the doctor says. This way you will know what you need to do.  · Your throat may feel sore for a few days. You may rinse your mouth and throat out after the procedure. Rinse with mouthwash or salt water.  · Do not drive for 24 hours after the procedure.  · Ask your doctor when you may go back to your normal activities.  What follow-up care is needed?   · Your doctor may ask you to make visits to the office  to check on your progress. Be sure to keep these visits.  · The results may help your doctor find out what is wrong. Together you can make a plan for care.  What problems could happen?   · Injury to the esophagus  · Bleeding  · Infection  · Reaction to the sedation  · Liquid in your lung  · Sore throat  · Tear in colon lining  Last Reviewed Date   2021-04-14  Consumer Information Use and Disclaimer   This information is not specific medical advice and does not replace information you receive from your health care provider. This is only a brief summary of general information. It does NOT include all information about conditions, illnesses, injuries, tests, procedures, treatments, therapies, discharge instructions or life-style choices that may apply to you. You must talk with your health care provider for complete information about your health and treatment options. This information should not be used to decide whether or not to accept your health care providers advice, instructions or recommendations. Only your health care provider has the knowledge and training to provide advice that is right for you.  Copyright   Copyright © 2021 UpToDate, Inc. and its affiliates and/or licensors. All rights reserved.

## 2022-01-25 VITALS
DIASTOLIC BLOOD PRESSURE: 80 MMHG | RESPIRATION RATE: 16 BRPM | HEART RATE: 59 BPM | HEIGHT: 70 IN | TEMPERATURE: 97 F | BODY MASS INDEX: 29.35 KG/M2 | OXYGEN SATURATION: 95 % | SYSTOLIC BLOOD PRESSURE: 122 MMHG | WEIGHT: 205 LBS

## 2022-01-28 LAB
FINAL PATHOLOGIC DIAGNOSIS: NORMAL
GROSS: NORMAL
Lab: NORMAL

## 2022-01-31 NOTE — PROGRESS NOTES
Please let him know the polyp was normal, no action is needed.    Daya Espinal MD  Gastroenterology  Director of Advanced Endoscopy at [facility]

## 2022-03-14 ENCOUNTER — LAB VISIT (OUTPATIENT)
Dept: LAB | Facility: HOSPITAL | Age: 67
End: 2022-03-14
Attending: UROLOGY
Payer: MEDICARE

## 2022-03-14 DIAGNOSIS — C61 MALIGNANT NEOPLASM OF PROSTATE: ICD-10-CM

## 2022-03-14 LAB — COMPLEXED PSA SERPL-MCNC: 0.01 NG/ML (ref 0–4)

## 2022-03-14 PROCEDURE — 84153 ASSAY OF PSA TOTAL: CPT | Performed by: UROLOGY

## 2022-03-14 PROCEDURE — 36415 COLL VENOUS BLD VENIPUNCTURE: CPT | Performed by: UROLOGY

## 2022-03-17 NOTE — PROGRESS NOTES
Clinic Note  3/17/2022      Subjective:         Chief Complaint:   HPI  Kike Simth is a 66 y.o. male diagnosed with [id number] prostate cancer. Consult from Dr Draling.  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  Potent and continent prior to surgery. RALP (robotic assisted laparoscopic prostatectomy) 9/20/2021- path showed 1+ LN. [id number], [place] 4+3.  Reviewed path at last visit. Discussed N1 disease. Discussed observation, ADT +/- EBRT. Patient interested in pursuing surveillance.  Has MICHAELA, using 4-5 minipads/day. Doing kegel exercises 4x10. Symptoms worsened by HCTZ, will discuss with PCP.  Has ED, sees Dr. Chen. Continuing cialis.     FH -negative  PSA - 5.7  Stage - T1C  Volume - 17 ccs TRUS  MRI - n/a  Biopsy - 7/19/2021- [place] 4+3 left apex 1/2 40%; [place] 3+4 left base 1/2 20%, right apex 2/2 40%, right middle 1/2 30%; [place] 6 2/2 30%. Overall 7/12 cores positive.  Bone scan-negative  CT scan-negative  MOON Score - 5 intermediate risk  NCCN - Unfavorable intermediate  Germline testing - not indicated    3/18/2022- PSA 0.01  Has been working with PT. Continence improved, only leaks with cough.  Stopped Cialis due to GERD.    Lab Results   Component Value Date    PSA 5.5 (H) 06/18/2021    PSA 4.1 (H) 07/21/2020    PSA 3.9 03/19/2019    PSA 3.8 03/02/2018    PSA 3.7 09/20/2017    PSA 3.7 02/20/2017    PSA 2.6 08/23/2016    PSA 3.3 12/29/2015    PSA 2.3 10/01/2014    PSA 1.81 05/23/2013    PSADIAG 0.01 03/14/2022    PSADIAG 0.01 11/02/2021    PSADIAG 5.7 (H) 07/12/2021      Past Medical History:   Diagnosis Date    Allergy     BPH (benign prostatic hyperplasia)     History of colon polyps 1/25/2018    He has had colon polyps since he was in his 30's.  He has had multiple colonoscopies and has had adenomatous polyps.    History of fracture 9/14/2021    History of inguinal hernia repair 9/14/2021    Hyperlipidemia     Hypertension     Inguinal hernia     IPMN (intraductal papillary  mucinous neoplasm)     annual eus or mri    Obesity     Prostate cancer     Squamous cell carcinoma skin of arm      r arm;s/p mohs     Family History   Problem Relation Age of Onset    Macular degeneration Maternal Uncle     Bladder Cancer Brother         smoker    Cataracts Mother     Fanconi anemia Mother     Leukemia Father 80    Coronary artery disease Father     Heart attack Father 72    Multiple myeloma Sister 59     Social History     Socioeconomic History    Marital status:    Tobacco Use    Smoking status: Never Smoker    Smokeless tobacco: Never Used   Substance and Sexual Activity    Alcohol use: No     Comment: quit 1983    Drug use: Never   Social History Narrative     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      Lost 35 y/o son to drug overdose summer 16     Social Determinants of Health     Financial Resource Strain: Low Risk     Difficulty of Paying Living Expenses: Not hard at all   Food Insecurity: No Food Insecurity    Worried About Running Out of Food in the Last Year: Never true    Ran Out of Food in the Last Year: Never true   Transportation Needs: No Transportation Needs    Lack of Transportation (Medical): No    Lack of Transportation (Non-Medical): No   Physical Activity: Sufficiently Active    Days of Exercise per Week: 5 days    Minutes of Exercise per Session: 50 min   Stress: No Stress Concern Present    Feeling of Stress : Only a little   Social Connections: Unknown    Frequency of Communication with Friends and Family: More than three times a week    Frequency of Social Gatherings with Friends and Family: More than three times a week    Active Member of Clubs or Organizations: No    Attends Club or Organization Meetings: Never    Marital Status:    Housing Stability: Low Risk     Unable to Pay for Housing in the Last Year: No    Number of Places Lived in the Last Year: 1    Unstable  Housing in the Last Year: No     Past Surgical History:   Procedure Laterality Date    CLOSURE OF DEFECT OF MOHS PROCEDURE  07/10/2020    dr reynoso    COLONOSCOPY N/A 1/25/2018    Procedure: COLONOSCOPY;  Surgeon: Juan A Walter MD;  Location: [facility];  Service: Endoscopy;  Laterality: N/A;    ENDOSCOPIC ULTRASOUND OF UPPER GASTROINTESTINAL TRACT N/A 1/24/2022    Procedure: ULTRASOUND, ENDOSCOPIC, UPPER GI TRACT;  Surgeon: Daya Espinal MD;  Location: [facility];  Service: Endoscopy;  Laterality: N/A;    KNEE ARTHROSCOPY Left     ORIF TIBIA & FIBULA FRACTURES Left 08/10/2019    tib/fib fx    ROBOT-ASSISTED LAPAROSCOPIC PROSTATECTOMY USING DA ALEX XI N/A 9/20/2021    Procedure: XI ROBOTIC PROSTATECTOMY;  Surgeon: Fidencio Morton MD;  Location: [address];  Service: Urology;  Laterality: N/A;  3hr gen with regional    ROBOT-ASSISTED LAPAROSCOPIC REPAIR OF INGUINAL HERNIA USING DA AELX XI Left 8/14/2020    Procedure: XI ROBOTIC REPAIR, HERNIA, INGUINAL;  Surgeon: Mark Anthony Araujo MD;  Location: [facility];  Service: General;  Laterality: Left;    ROBOT-ASSISTED LYMPHADENECTOMY N/A 9/20/2021    Procedure: ROBOTIC LYMPHADENECTOMY;  Surgeon: Fidencio Morton MD;  Location: [facility] OR [address] [place] [address];  Service: Urology;  Laterality: N/A;     Patient Active Problem List   Diagnosis    Hypertension    Epididymal cyst    Hyperlipidemia    Renal cyst    Malignant neoplasm of prostate    IPMN (intraductal papillary mucinous neoplasm)    Long term (current) use of antithrombotics/antiplatelets    Allergies    Overweight    Abnormal EKG    Snoring    Edema    Erectile dysfunction following radical prostatectomy    Decreased strength, endurance, and mobility    Urine incontinence     Review of Systems   Constitutional: Negative for appetite change, chills, fatigue, fever and unexpected weight change.   HENT: Negative for nosebleeds.    Respiratory: Negative for shortness of breath and wheezing.   "  Cardiovascular: Negative for chest pain, palpitations and leg swelling.   Gastrointestinal: Negative for abdominal distention, abdominal pain, constipation, diarrhea, nausea and vomiting.   Genitourinary: Negative for dysuria and hematuria.   Musculoskeletal: Negative for arthralgias and back pain.   Skin: Negative for pallor.   Neurological: Negative for dizziness, seizures and syncope.   Hematological: Negative for adenopathy.   Psychiatric/Behavioral: Negative for dysphoric mood.         Objective:      There were no vitals taken for this visit.  Estimated body mass index is 29.41 kg/m² as calculated from the following:    Height as of 1/24/22: 5' 10" (1.778 m).    Weight as of 1/24/22: 93 kg (205 lb).  Physical Exam      Assessment and Plan:           Problem List Items Addressed This Visit    None         Follow up:   Discussed surveillance vs therapy.Patient interested in pursuing surveillance.  Recommend he revisit PT. Not interested in further penile rehab at this time.  4 month with PSA.    Fidencio Morton"

## 2022-03-18 ENCOUNTER — OFFICE VISIT (OUTPATIENT)
Dept: UROLOGY | Facility: CLINIC | Age: 67
End: 2022-03-18
Payer: MEDICARE

## 2022-03-18 ENCOUNTER — TELEPHONE (OUTPATIENT)
Dept: INTERNAL MEDICINE | Facility: CLINIC | Age: 67
End: 2022-03-18
Payer: MEDICARE

## 2022-03-18 VITALS
WEIGHT: 212.75 LBS | BODY MASS INDEX: 30.46 KG/M2 | TEMPERATURE: 97 F | OXYGEN SATURATION: 99 % | HEART RATE: 70 BPM | SYSTOLIC BLOOD PRESSURE: 153 MMHG | DIASTOLIC BLOOD PRESSURE: 88 MMHG | HEIGHT: 70 IN

## 2022-03-18 DIAGNOSIS — C61 MALIGNANT NEOPLASM OF PROSTATE: Primary | ICD-10-CM

## 2022-03-18 PROCEDURE — 1159F PR MEDICATION LIST DOCUMENTED IN MEDICAL RECORD: ICD-10-PCS | Mod: CPTII,S$GLB,, | Performed by: UROLOGY

## 2022-03-18 PROCEDURE — 3077F SYST BP >= 140 MM HG: CPT | Mod: CPTII,S$GLB,, | Performed by: UROLOGY

## 2022-03-18 PROCEDURE — 1126F PR PAIN SEVERITY QUANTIFIED, NO PAIN PRESENT: ICD-10-PCS | Mod: CPTII,S$GLB,, | Performed by: UROLOGY

## 2022-03-18 PROCEDURE — 3008F PR BODY MASS INDEX (BMI) DOCUMENTED: ICD-10-PCS | Mod: CPTII,S$GLB,, | Performed by: UROLOGY

## 2022-03-18 PROCEDURE — 1101F PR PT FALLS ASSESS DOC 0-1 FALLS W/OUT INJ PAST YR: ICD-10-PCS | Mod: CPTII,S$GLB,, | Performed by: UROLOGY

## 2022-03-18 PROCEDURE — 1126F AMNT PAIN NOTED NONE PRSNT: CPT | Mod: CPTII,S$GLB,, | Performed by: UROLOGY

## 2022-03-18 PROCEDURE — 3288F FALL RISK ASSESSMENT DOCD: CPT | Mod: CPTII,S$GLB,, | Performed by: UROLOGY

## 2022-03-18 PROCEDURE — 99999 PR PBB SHADOW E&M-EST. PATIENT-LVL III: ICD-10-PCS | Mod: PBBFAC,,, | Performed by: UROLOGY

## 2022-03-18 PROCEDURE — 99213 PR OFFICE/OUTPT VISIT, EST, LEVL III, 20-29 MIN: ICD-10-PCS | Mod: S$GLB,,, | Performed by: UROLOGY

## 2022-03-18 PROCEDURE — 3079F DIAST BP 80-89 MM HG: CPT | Mod: CPTII,S$GLB,, | Performed by: UROLOGY

## 2022-03-18 PROCEDURE — 1159F MED LIST DOCD IN RCRD: CPT | Mod: CPTII,S$GLB,, | Performed by: UROLOGY

## 2022-03-18 PROCEDURE — 99213 OFFICE O/P EST LOW 20 MIN: CPT | Mod: S$GLB,,, | Performed by: UROLOGY

## 2022-03-18 PROCEDURE — 3079F PR MOST RECENT DIASTOLIC BLOOD PRESSURE 80-89 MM HG: ICD-10-PCS | Mod: CPTII,S$GLB,, | Performed by: UROLOGY

## 2022-03-18 PROCEDURE — 3288F PR FALLS RISK ASSESSMENT DOCUMENTED: ICD-10-PCS | Mod: CPTII,S$GLB,, | Performed by: UROLOGY

## 2022-03-18 PROCEDURE — 3008F BODY MASS INDEX DOCD: CPT | Mod: CPTII,S$GLB,, | Performed by: UROLOGY

## 2022-03-18 PROCEDURE — 1101F PT FALLS ASSESS-DOCD LE1/YR: CPT | Mod: CPTII,S$GLB,, | Performed by: UROLOGY

## 2022-03-18 PROCEDURE — 99999 PR PBB SHADOW E&M-EST. PATIENT-LVL III: CPT | Mod: PBBFAC,,, | Performed by: UROLOGY

## 2022-03-18 PROCEDURE — 3077F PR MOST RECENT SYSTOLIC BLOOD PRESSURE >= 140 MM HG: ICD-10-PCS | Mod: CPTII,S$GLB,, | Performed by: UROLOGY

## 2022-03-18 NOTE — TELEPHONE ENCOUNTER
Attempted to call the pt on 3/18/22 to reschedule his appt due to the provider being out of office. No answer lvm stating appt will be canceled give the clinic a call back to reschedule.\\AR

## 2022-03-21 ENCOUNTER — PATIENT MESSAGE (OUTPATIENT)
Dept: UROLOGY | Facility: CLINIC | Age: 67
End: 2022-03-21
Payer: MEDICARE

## 2022-03-22 DIAGNOSIS — N39.3 MALE STRESS INCONTINENCE: Primary | ICD-10-CM

## 2022-04-06 ENCOUNTER — PATIENT MESSAGE (OUTPATIENT)
Dept: UROLOGY | Facility: CLINIC | Age: 67
End: 2022-04-06
Payer: MEDICARE

## 2022-04-07 ENCOUNTER — PATIENT MESSAGE (OUTPATIENT)
Dept: ADMINISTRATIVE | Facility: OTHER | Age: 67
End: 2022-04-07
Payer: MEDICARE

## 2022-04-07 ENCOUNTER — OFFICE VISIT (OUTPATIENT)
Dept: INTERNAL MEDICINE | Facility: CLINIC | Age: 67
End: 2022-04-07
Payer: MEDICARE

## 2022-04-07 VITALS
TEMPERATURE: 96 F | OXYGEN SATURATION: 98 % | WEIGHT: 214.31 LBS | BODY MASS INDEX: 30.75 KG/M2 | SYSTOLIC BLOOD PRESSURE: 138 MMHG | HEART RATE: 67 BPM | DIASTOLIC BLOOD PRESSURE: 84 MMHG

## 2022-04-07 DIAGNOSIS — I10 PRIMARY HYPERTENSION: Primary | ICD-10-CM

## 2022-04-07 PROCEDURE — 1159F MED LIST DOCD IN RCRD: CPT | Mod: CPTII,S$GLB,, | Performed by: INTERNAL MEDICINE

## 2022-04-07 PROCEDURE — 1101F PT FALLS ASSESS-DOCD LE1/YR: CPT | Mod: CPTII,S$GLB,, | Performed by: INTERNAL MEDICINE

## 2022-04-07 PROCEDURE — 1126F PR PAIN SEVERITY QUANTIFIED, NO PAIN PRESENT: ICD-10-PCS | Mod: CPTII,S$GLB,, | Performed by: INTERNAL MEDICINE

## 2022-04-07 PROCEDURE — 1101F PR PT FALLS ASSESS DOC 0-1 FALLS W/OUT INJ PAST YR: ICD-10-PCS | Mod: CPTII,S$GLB,, | Performed by: INTERNAL MEDICINE

## 2022-04-07 PROCEDURE — 3008F PR BODY MASS INDEX (BMI) DOCUMENTED: ICD-10-PCS | Mod: CPTII,S$GLB,, | Performed by: INTERNAL MEDICINE

## 2022-04-07 PROCEDURE — 3075F PR MOST RECENT SYSTOLIC BLOOD PRESS GE 130-139MM HG: ICD-10-PCS | Mod: CPTII,S$GLB,, | Performed by: INTERNAL MEDICINE

## 2022-04-07 PROCEDURE — 3079F DIAST BP 80-89 MM HG: CPT | Mod: CPTII,S$GLB,, | Performed by: INTERNAL MEDICINE

## 2022-04-07 PROCEDURE — 1159F PR MEDICATION LIST DOCUMENTED IN MEDICAL RECORD: ICD-10-PCS | Mod: CPTII,S$GLB,, | Performed by: INTERNAL MEDICINE

## 2022-04-07 PROCEDURE — 1126F AMNT PAIN NOTED NONE PRSNT: CPT | Mod: CPTII,S$GLB,, | Performed by: INTERNAL MEDICINE

## 2022-04-07 PROCEDURE — 3075F SYST BP GE 130 - 139MM HG: CPT | Mod: CPTII,S$GLB,, | Performed by: INTERNAL MEDICINE

## 2022-04-07 PROCEDURE — 4010F PR ACE/ARB THEARPY RXD/TAKEN: ICD-10-PCS | Mod: CPTII,S$GLB,, | Performed by: INTERNAL MEDICINE

## 2022-04-07 PROCEDURE — 99213 OFFICE O/P EST LOW 20 MIN: CPT | Mod: S$GLB,,, | Performed by: INTERNAL MEDICINE

## 2022-04-07 PROCEDURE — 99213 PR OFFICE/OUTPT VISIT, EST, LEVL III, 20-29 MIN: ICD-10-PCS | Mod: S$GLB,,, | Performed by: INTERNAL MEDICINE

## 2022-04-07 PROCEDURE — 3288F PR FALLS RISK ASSESSMENT DOCUMENTED: ICD-10-PCS | Mod: CPTII,S$GLB,, | Performed by: INTERNAL MEDICINE

## 2022-04-07 PROCEDURE — 3079F PR MOST RECENT DIASTOLIC BLOOD PRESSURE 80-89 MM HG: ICD-10-PCS | Mod: CPTII,S$GLB,, | Performed by: INTERNAL MEDICINE

## 2022-04-07 PROCEDURE — 99999 PR PBB SHADOW E&M-EST. PATIENT-LVL IV: CPT | Mod: PBBFAC,,, | Performed by: INTERNAL MEDICINE

## 2022-04-07 PROCEDURE — 99999 PR PBB SHADOW E&M-EST. PATIENT-LVL IV: ICD-10-PCS | Mod: PBBFAC,,, | Performed by: INTERNAL MEDICINE

## 2022-04-07 PROCEDURE — 4010F ACE/ARB THERAPY RXD/TAKEN: CPT | Mod: CPTII,S$GLB,, | Performed by: INTERNAL MEDICINE

## 2022-04-07 PROCEDURE — 3288F FALL RISK ASSESSMENT DOCD: CPT | Mod: CPTII,S$GLB,, | Performed by: INTERNAL MEDICINE

## 2022-04-07 PROCEDURE — 3008F BODY MASS INDEX DOCD: CPT | Mod: CPTII,S$GLB,, | Performed by: INTERNAL MEDICINE

## 2022-04-07 RX ORDER — VALSARTAN 80 MG/1
80 TABLET ORAL DAILY
Qty: 90 TABLET | Refills: 0 | Status: SHIPPED | OUTPATIENT
Start: 2022-04-07 | End: 2022-05-09 | Stop reason: ALTCHOICE

## 2022-04-07 NOTE — PROGRESS NOTES
Subjective:      Patient ID: Kike Smith is a 66 y.o. male.    Chief Complaint: Hypertension    HPI     67 yo with   Patient Active Problem List   Diagnosis    Hypertension    Epididymal cyst    Hyperlipidemia    Renal cyst    Malignant neoplasm of prostate    IPMN (intraductal papillary mucinous neoplasm)    Long term (current) use of antithrombotics/antiplatelets    Allergies    Overweight    Abnormal EKG    Snoring    Edema    Erectile dysfunction following radical prostatectomy    Decreased strength, endurance, and mobility    Urine incontinence     Past Medical History:   Diagnosis Date    Allergy     BPH (benign prostatic hyperplasia)     History of colon polyps 1/25/2018    He has had colon polyps since he was in his 30's.  He has had multiple colonoscopies and has had adenomatous polyps.    History of fracture 9/14/2021    History of inguinal hernia repair 9/14/2021    Hyperlipidemia     Hypertension     Inguinal hernia     IPMN (intraductal papillary mucinous neoplasm)     annual eus or mri    Obesity     Prostate cancer     Squamous cell carcinoma skin of arm      r arm;s/p mohs     Here today for management of hypertension.  He has noticed his blood pressure rising at home.  From March 31st to April 7th his blood pressure been 139-181/ he did have 1 questionable reading diastolic of 120 which resolved quickly with a recheck.  He was changed to amlodipine from hydrochlorothiazide approximately 2 months ago due to urinary frequency and nocturia.  Symptoms improved off of hydrochlorothiazide  Review of Systems   Constitutional: Negative for chills and fever.   HENT: Negative for ear pain and sore throat.    Respiratory: Negative for cough.    Cardiovascular: Negative for chest pain.   Gastrointestinal: Negative for abdominal pain and blood in stool.   Genitourinary: Negative for dysuria and hematuria.   Neurological: Negative for seizures and syncope.     Objective:   BP  138/84 (BP Location: Left arm, Patient Position: Sitting, BP Method: Large (Manual))   Pulse 67   Temp 96.4 °F (35.8 °C) (Tympanic)   Wt 97.2 kg (214 lb 4.6 oz)   SpO2 98%   BMI 30.75 kg/m²     Physical Exam  Constitutional:       General: He is not in acute distress.     Appearance: He is well-developed.   Cardiovascular:      Rate and Rhythm: Normal rate.   Pulmonary:      Effort: Pulmonary effort is normal.      Breath sounds: Normal breath sounds.   Skin:     General: Skin is warm and dry.   Psychiatric:         Behavior: Behavior normal.         Assessment:     1. Primary hypertension      Plan:   Primary hypertension  -     valsartan (DIOVAN) 80 MG tablet; Take 1 tablet (80 mg total) by mouth once daily.  Dispense: 90 tablet; Refill: 0    not at goal. Add diovan. Has f/u planned with pcp.     Lab Frequency Next Occurrence   Comprehensive Metabolic Panel Once 06/28/2022   PSA, Screening Once 06/28/2022   Lipid Panel Once 06/28/2022   Creatinine, serum Once 07/22/2021   Ambulatory referral/consult to Sleep Disorders Once 09/21/2021   Ambulatory referral/consult to Urology Once 10/28/2021   Prostate Specific Antigen, Diagnostic Once 07/16/2022   Ambulatory referral/consult to Physical/Occupational Therapy Once 03/29/2022       Problem List Items Addressed This Visit     Hypertension - Primary (Chronic)    Relevant Medications    valsartan (DIOVAN) 80 MG tablet          Follow up if symptoms worsen or fail to improve.

## 2022-04-07 NOTE — PATIENT INSTRUCTIONS
Ok to start valsartan 40 mg (1/2 of 80mg tablet) daily for 2 weeks and then increase to 80mg if bp not at goal.    bp goal 115-135 / 60-85

## 2022-04-20 ENCOUNTER — CLINICAL SUPPORT (OUTPATIENT)
Dept: REHABILITATION | Facility: HOSPITAL | Age: 67
End: 2022-04-20
Attending: UROLOGY
Payer: MEDICARE

## 2022-04-20 DIAGNOSIS — N39.3 MALE STRESS INCONTINENCE: ICD-10-CM

## 2022-04-20 DIAGNOSIS — R53.1 DECREASED STRENGTH, ENDURANCE, AND MOBILITY: Primary | ICD-10-CM

## 2022-04-20 DIAGNOSIS — R68.89 DECREASED STRENGTH, ENDURANCE, AND MOBILITY: Primary | ICD-10-CM

## 2022-04-20 DIAGNOSIS — N39.3 STRESS INCONTINENCE OF URINE: ICD-10-CM

## 2022-04-20 DIAGNOSIS — Z74.09 DECREASED STRENGTH, ENDURANCE, AND MOBILITY: Primary | ICD-10-CM

## 2022-04-20 PROCEDURE — 97161 PT EVAL LOW COMPLEX 20 MIN: CPT

## 2022-04-20 PROCEDURE — 97110 THERAPEUTIC EXERCISES: CPT

## 2022-04-26 ENCOUNTER — CLINICAL SUPPORT (OUTPATIENT)
Dept: REHABILITATION | Facility: HOSPITAL | Age: 67
End: 2022-04-26
Attending: UROLOGY
Payer: MEDICARE

## 2022-04-26 DIAGNOSIS — M62.89 PELVIC FLOOR WEAKNESS, MALE: ICD-10-CM

## 2022-04-26 DIAGNOSIS — N39.3 STRESS INCONTINENCE OF URINE: Primary | ICD-10-CM

## 2022-04-26 PROCEDURE — 97110 THERAPEUTIC EXERCISES: CPT

## 2022-04-26 PROCEDURE — 97112 NEUROMUSCULAR REEDUCATION: CPT

## 2022-04-26 NOTE — PROGRESS NOTES
"Pelvic Floor Treatment Note     Patient: Kike Smith   Clinic #: [id number]   Diagnosis: Pelvic floor incontinence   Date of Start of care: 04/23/22  Date of treatment: 04/26/2022   Plan of Care Expiration: 02/06/22  Authorization date: 12/31/22   Visits- 1/20    Time in: 0945  Time out: 1015  Total Treatment time: 25 minutes         Subjective: The patient has notice improvement. Has been able to perform HEP daily without complaints.   Kike reported 0/10 pain today.    Objective:  No measurements performed today     Treatment:    Patient participated in neuromuscular re-education activities to improve:coordination for 15 minutes. This included the following activities:    Diaphragmatic breathing  x 3x1 min   Diaphragmatic breating with kegel   (nuts to guts) x 4x20"  *Pelvic floor @ 50% while breathing into a theraball on mat*   Supine ramp up 0%-100%  8x30"   Sitting ramp ups (100-)  8x30"   Contract and Relax  5 mins    Goblet squats  x 3x8 with pelvic floor contraction at 50%   Pelvic floor contraction / walks  X  Varies distances with pelvic floor contraction @ 25-75%                   Patient was instructed in and performed therapeutic exercises to develop coordination and endurance for 10 minutes including:  Kike verbalized understanding of all instruction and selection list was not provided with a handout.     INTERVENTION TODAY COMMENT    Patient Education/ Bladder Logs/ HEP x Updated HEP and revisited purpose of bladder logs. Educate patient on techniques to reduce pelvic floor tension.                                      Kike Smith  received the following manual therapy techniques: trigger point/myofascial release, soft tissue Mobilization, passive stretching and visceral mobilization applied to the: perineal region 0 minutes.                                     Assessment:  Kike Smith continues to have difficulty with performing pelvic floor contraction without tactile/verbal cues. " Therapist able to educate patient on importance of tactile feedback with HEP. Therapist able to educate patient on importance of bladder log and purpose.      Will the patient continue to benefit from skilled PT intervention? Yes  Medical Necessity is demonstrated by:  Requires skilled supervision to complete and progress HEP  Progress towards goals: excellent    New/Revised Goals:  Be dry with >95% of ADLs.     Plan:  Continue with established Plan of Care toward PT goals.              Weekly Bladder Logs (Not provided this week)   Date Urine frequency  PAD wt (pt providing pad number)                                         *15-20 seconds per urine for duration*

## 2022-04-26 NOTE — PLAN OF CARE
Physical Therapy Pelvic Floor Evaluation      Patient: Kike Smith   [facility] #:  [id number]    Date of treatment: 04/20/2022   Time in:1330  Time out:1415     Subjective:      Kike is a 66 y.o. male evaluated for incontinence and ED. Kike is a 66 y.o. male with a history of prostate cancer s/p RALP with BPLND 9/20/21 with pathology revealing focal EPE as well as perineural invasion, negative SVI, LVI, Grade Group 3.  1 of 6 nodes found to be positive for metastatic disease. The patient had sx for removal of cancer on 09/27/21. The patient noticed improvement of incontinence for the first 4 weeks after sx. Feels as if the healing process has plateau.         Physician:  Fidencio Morton MD   Diagnosis:        Encounter Diagnosis   Name Primary?    Malignant neoplasm of prostate           Treatment ordered: Evaluate and treat     Medical History:        Past Medical History:   Diagnosis Date    Allergy      BPH (benign prostatic hyperplasia)      History of colon polyps 1/25/2018     He has had colon polyps since he was in his 30's.  He has had multiple colonoscopies and has had adenomatous polyps.    History of fracture 9/14/2021    History of inguinal hernia repair 9/14/2021    Hyperlipidemia      Hypertension      Inguinal hernia      IPMN (intraductal papillary mucinous neoplasm)       annual eus or mri    Obesity      Prostate cancer      Squamous cell carcinoma skin of arm        r arm;s/p mohs         Surgical History:   Past Surgical History:   Procedure Laterality Date    CLOSURE OF DEFECT OF MOHS PROCEDURE   07/10/2020     dr reynoso    COLONOSCOPY N/A 1/25/2018     Procedure: COLONOSCOPY;  Surgeon: Juan A Walter MD;  Location: [facility];  Service: Endoscopy;  Laterality: N/A;    KNEE ARTHROSCOPY Left      ORIF TIBIA & FIBULA FRACTURES Left 08/10/2019     tib/fib fx    ROBOT-ASSISTED LAPAROSCOPIC PROSTATECTOMY USING DA ALEX XI N/A 9/20/2021     Procedure: XI ROBOTIC  PROSTATECTOMY;  Surgeon: Fidencio Morton MD;  Location: [facility] OR 2ND FLR;  Service: Urology;  Laterality: N/A;  3hr gen with regional    ROBOT-ASSISTED LAPAROSCOPIC REPAIR OF INGUINAL HERNIA USING DA ALEX XI Left 8/14/2020     Procedure: XI ROBOTIC REPAIR, HERNIA, INGUINAL;  Surgeon: Mark Anthony Arauoj MD;  Location: [facility] OR;  Service: General;  Laterality: Left;    ROBOT-ASSISTED LYMPHADENECTOMY N/A 9/20/2021     Procedure: ROBOTIC LYMPHADENECTOMY;  Surgeon: Fidencio Morton MD;  Location: [facility] OR 2ND FLR;  Service: Urology;  Laterality: N/A;         Medications:        Current Outpatient Medications   Medication Sig    amLODIPine (NORVASC) 5 MG tablet Take 1 tablet (5 mg total) by mouth once daily.    aspirin (ECOTRIN) 81 MG EC tablet Take 81 mg by mouth once daily.    azelastine (ASTELIN) 137 mcg (0.1 %) nasal spray 1 spray by Nasal route 2 (two) times daily.     ciprofloxacin-dexamethasone 0.3-0.1% (CIPRODEX) 0.3-0.1 % DrpS      diazePAM (VALIUM) 5 MG tablet Take 1 tablet (5 mg total) by mouth once. for 1 dose    EPINEPHrine (EPIPEN) 0.3 mg/0.3 mL AtIn Auvi-Q 0.3 mg/0.3 mL injection, auto-injector   Take 1 auto as needed by injection route as directed.    fluocinolone acetonide oiL 0.01 % Drop      fluticasone propionate (FLONASE) 50 mcg/actuation nasal spray 2 sprays by Each Nostril route once daily.    ibuprofen (ADVIL,MOTRIN) 800 MG tablet Take 1 tablet (800 mg total) by mouth 3 (three) times daily.    Lactobacillus acidophilus (PROBIOTIC) 10 billion cell Cap Take 1 tablet by mouth once daily.     naproxen sodium (ANAPROX) 220 MG tablet Take 220 mg by mouth daily as needed.    oxyCODONE (ROXICODONE) 5 MG immediate release tablet Take 1 tablet (5 mg total) by mouth every 6 (six) hours as needed for Pain.    polyethylene glycol (GLYCOLAX) 17 gram/dose powder Take 17 g by mouth once daily.    pravastatin (PRAVACHOL) 40 MG tablet TAKE 1 TABLET BY MOUTH EVERY DAY    tadalafiL (CIALIS) 5 MG tablet  Take 1 tablet (5 mg total) by mouth daily as needed for Erectile Dysfunction.    vitamin D (VITAMIN D3) 1000 units Tab Take 1,000 Units by mouth once daily.      No current facility-administered medications for this visit.       Allergies: Review of patient's allergies indicates:  No Known Allergies   Precautions: universal  OB:GYN History:Prostate Cancer         Bladder/Bowel History: trouble initiating urine stream, constant dribbling of urine, slow stream, trouble emptying bladder completely and urinary incontinence                                        SUBJECTIVE:   History of current complaint: Been occurring for 3 months now      Date of Onset: After prostate sx  Symptoms are: unchanged     Frequency of Urination: Daytime: >8x daily                                             Night time: 1-2x      Urine Stream: weak and interrupted     Frequency of Bowel Movements: once a day     Types of Fluid Intake: >40 oz daily; has been varying         Bladder Leakage: Yes  Frequency of incidents: >3x daily   Amount Leaked: teaspoons     Colon Leakage: No  Frequency of incidents: 0  Amount Leaked: full movement     Form of Protection: pad in brief  Number of Pads required in 24 hours: 2-5 daily      Activities that cause symptoms:changing positions (ex. sit to stand), with cough/sneeze/laugh/yell, vigorous activity or exercise and sexual activity     Current Exercise: Riding bike >20 mins daily      Pain:  Patient reports 0/10    Lowest- 0/10  Highest- 3/10 after sx         OBJECTIVE:  Patient received entry box minutes of treatment which consisted of evaluation     ORTHO SCREEN  Posture: WNL                                        Pelvic Alignment: no deviations noted in supine                                     ABDOMINALS  Scarring:  Not checked                                                 Diastasis: 1 finger                Abdominal strength:    Not Tested in evaluation   rectus                             Transverse                                   External Clock- Not checked in evaluation; will be exam in first treatment session.                                            Internal Clock- Not checked in evaluation; will be exam in 2nd treatment session if required.         RECTAL PELVIC FLOOR EXAM: Not checked in evaluation; will be exam in 2nd treatment session if required.         Treatment Given: instructed on general anatomy/physiology of urinary/bowel system; discussed plan of care with patient; instructed in benefits/risks of treatment; instructed in alternative methods of treatment; instructed in risks of refusing treatment; patient agreed to treatment plan; instructed in bladder irritants, instructed in diaphragmatic breathing per handout issued, instructed in Kegels per handout issued and instructed in perineal stretching/massage per handout issued     ASSESSMENT:  The patient demonstrated difficulty with pelvic floor contractions and anatomy of male pelvic floor. Therapist used session to educate patient on proper pelvic floor contractions and creating a bladder journal.      Problem List:    altered posture, poor knowledge of body mechanics and posture, poor trunk stability, pelvic floor tenderness, decreased pelvic muscle strength, decreased endurance of the pelvic muscles, increased tension of the pelvic muscles, poor quality of pelvic muscle contraction and poor coordination of pelvic floor muscles during ADL's leading to urinary or fecal leakage     Barriers to Learning: none     Educational/Spiritual/Cultural needs:  none     FUNCTIONAL GOALS: 2 months  Patient able to participate in exercise with less leakage of urine., Patient able to perform basic ADL with less leaking., Patient able to perform advanced ADL (moving furniture, heavy lifting, gardening) with less leaking., Cough, sneeze, or laugh with less incontinence., Decreased need for pad use for incontinence. , Able to move LE's while  keeping trunk still during exercise. and Able to maintain normal breathing pattern during pelvic floor muscle contraction.     PATIENTS GOALS: Be able to perform 90% of ADLs at work dry. To be able to use < 1 pad daily.      PLAN:  Quick strech/Tapping, Myofascial release, scar massage, soft tissue mobilization, stretching, Kegels, theraputic exercises and neuromuscular re-ed     Physical Therapy Education: bladder irritants, anatomy/physiology of pelvic floor, posture/body mechanices, bladder retraining, diaphragmatic breathing, isometric abdominal exercises, kegels, perineal stretching/massage and proper bearing down techniques     Frequency/Duration: 1x week for 8 weeks

## 2022-04-29 ENCOUNTER — PATIENT MESSAGE (OUTPATIENT)
Dept: OTHER | Facility: OTHER | Age: 67
End: 2022-04-29
Payer: MEDICARE

## 2022-05-02 ENCOUNTER — CLINICAL SUPPORT (OUTPATIENT)
Dept: REHABILITATION | Facility: HOSPITAL | Age: 67
End: 2022-05-02
Payer: MEDICARE

## 2022-05-02 DIAGNOSIS — N39.3 STRESS INCONTINENCE OF URINE: ICD-10-CM

## 2022-05-02 DIAGNOSIS — M62.89 PELVIC FLOOR WEAKNESS, MALE: Primary | ICD-10-CM

## 2022-05-02 PROCEDURE — 97110 THERAPEUTIC EXERCISES: CPT

## 2022-05-02 PROCEDURE — 97112 NEUROMUSCULAR REEDUCATION: CPT

## 2022-05-02 NOTE — PROGRESS NOTES
"Pelvic Floor Treatment Note     Patient: Kike Smith   Clinic #: [id number]   Diagnosis: Pelvic floor incontinence   Date of Start of care: 04/23/22  Date of treatment: 05/02/2022   Plan of Care Expiration: 02/06/22  Authorization date: 12/31/22   Visits- 2/20 (+1 eval)     Time in: 1500  Time out: 1545  Total Treatment time: 40 minutes         Subjective: The patient has notice improvement. Has been able to perform HEP daily without complaints.   Kike reported 0/10 pain today.    Objective:  No measurements performed today     Treatment:    Patient participated in neuromuscular re-education activities to improve:coordination for 30 minutes. This included the following activities:    Diaphragmatic breathing   3x1 min   Diaphragmatic breating with kegel   (nuts to guts) x 6x30"  *Pelvic floor @ 50% while breathing into a theraball on mat*   Supine ramp up 0%-100%  8x30"   Sitting ramp ups (100-)  8x30"   Contract and Relax  5 mins    Goblet squats  x 3x8 with pelvic floor contraction at 50%   Pelvic floor contraction / walks  X  Varies distances with pelvic floor contraction @ 25-75%   Caraballo Carries x 8 x 15 yds pelvic floor contraction 50%   Dead Bugs  x 3x8 with pelvic floor contraction at 50%         Patient was instructed in and performed therapeutic exercises to develop coordination and endurance for 10 minutes including:  Kike verbalized understanding of all instruction and selection list was not provided with a handout.     INTERVENTION TODAY COMMENT    Patient Education/ Bladder Logs/ HEP x Updated HEP and revisited purpose of bladder logs. Educate patient on techniques to reduce pelvic floor tension.                                      Kike Smith  received the following manual therapy techniques: trigger point/myofascial release, soft tissue Mobilization, passive stretching and visceral mobilization applied to the: perineal region 0 minutes.                                 "     Assessment:  Kike Smith continues to have difficulty with performing pelvic floor contraction without tactile/verbal cues. Therapist able to educate patient on importance of tactile feedback with HEP. Therapist able to educate patient on importance of bladder log and purpose.      Will the patient continue to benefit from skilled PT intervention? Yes  Medical Necessity is demonstrated by:  Requires skilled supervision to complete and progress HEP  Progress towards goals: excellent    New/Revised Goals:  Be dry with >95% of ADLs.     Plan:  Continue with established Plan of Care toward PT goals.              Weekly Bladder Logs (Not provided this week)   Date Urine frequency  PAD wt (pt providing pad number)                                         *15-20 seconds per urine for duration*

## 2022-05-06 ENCOUNTER — IMMUNIZATION (OUTPATIENT)
Dept: PHARMACY | Facility: CLINIC | Age: 67
End: 2022-05-06

## 2022-05-06 DIAGNOSIS — Z23 NEED FOR VACCINATION: Primary | ICD-10-CM

## 2022-05-09 ENCOUNTER — PATIENT MESSAGE (OUTPATIENT)
Dept: OTHER | Facility: OTHER | Age: 67
End: 2022-05-09
Payer: MEDICARE

## 2022-05-16 ENCOUNTER — CLINICAL SUPPORT (OUTPATIENT)
Dept: REHABILITATION | Facility: HOSPITAL | Age: 67
End: 2022-05-16
Payer: MEDICARE

## 2022-05-16 DIAGNOSIS — M62.89 PELVIC FLOOR WEAKNESS, MALE: Primary | ICD-10-CM

## 2022-05-16 DIAGNOSIS — N39.3 STRESS INCONTINENCE OF URINE: ICD-10-CM

## 2022-05-16 PROCEDURE — 97110 THERAPEUTIC EXERCISES: CPT

## 2022-05-16 PROCEDURE — 97112 NEUROMUSCULAR REEDUCATION: CPT

## 2022-05-16 NOTE — PROGRESS NOTES
"Pelvic Floor Treatment Note     Patient: Kike Smith   Clinic #: [id number]   Diagnosis: Pelvic floor incontinence   Date of Start of care: 04/23/22  Date of treatment: 05/23/2022   Plan of Care Expiration: 02/06/22  Authorization date: 12/31/22   Visits- 3/20 (+1 eval)     Time in: 1245  Time out: 1330  Total Treatment time: 40 minutes         Subjective: The patient has notice improvement. Has been able to perform HEP daily without complaints.   Kike reported 0/10 pain today.    Objective:  No measurements performed today     Treatment:    Patient participated in neuromuscular re-education activities to improve:coordination for 25 minutes. This included the following activities:    Diaphragmatic breathing   3x1 min   Diaphragmatic breating with kegel   (nuts to guts) x 6x30"  *Pelvic floor @ 50% while breathing into a theraball on mat*   Supine ramp up 0%-100%  8x30"   Sitting ramp ups (100-)  8x30"   Contract and Relax  5 mins    Goblet squats   3x8 with pelvic floor contraction at 50%   Pelvic floor contraction / walks  X  Varies distances with pelvic floor contraction @ 25-75%   Caraballo Carries x 8 x 15 yds pelvic floor contraction 50%   Dead Bugs  x 3x8 with pelvic floor contraction at 50%         Patient was instructed in and performed therapeutic exercises to develop coordination and endurance for 15 minutes including:  Kike verbalized understanding of all instruction and selection list was not provided with a handout.     INTERVENTION TODAY COMMENT    Patient Education/ Bladder Logs/ HEP x Updated HEP and revisited purpose of bladder logs. Educate patient on techniques to reduce pelvic floor tension.                                      Kike Smith  received the following manual therapy techniques: trigger point/myofascial release, soft tissue Mobilization, passive stretching and visceral mobilization applied to the: perineal region 0 minutes.                                 "     Assessment:  Kike Smith continues to have difficulty with performing pelvic floor contraction without tactile/verbal cues. Therapist able to educate patient on importance of tactile feedback with HEP. Therapist able to educate patient on importance of bladder log and purpose.      Will the patient continue to benefit from skilled PT intervention? Yes  Medical Necessity is demonstrated by:  Requires skilled supervision to complete and progress HEP  Progress towards goals: excellent    New/Revised Goals:  Be dry with >95% of ADLs.     Plan:  Continue with established Plan of Care toward PT goals.              Weekly Bladder Logs (Not provided this week)   Date Urine frequency  PAD wt (pt providing pad number)                                         *15-20 seconds per urine for duration*

## 2022-05-17 ENCOUNTER — LAB VISIT (OUTPATIENT)
Dept: LAB | Facility: HOSPITAL | Age: 67
End: 2022-05-17
Attending: FAMILY MEDICINE
Payer: MEDICARE

## 2022-05-17 DIAGNOSIS — I10 PRIMARY HYPERTENSION: ICD-10-CM

## 2022-05-17 LAB
ANION GAP SERPL CALC-SCNC: 8 MMOL/L (ref 8–16)
BUN SERPL-MCNC: 19 MG/DL (ref 8–23)
CALCIUM SERPL-MCNC: 9.1 MG/DL (ref 8.7–10.5)
CHLORIDE SERPL-SCNC: 103 MMOL/L (ref 95–110)
CO2 SERPL-SCNC: 28 MMOL/L (ref 23–29)
CREAT SERPL-MCNC: 0.9 MG/DL (ref 0.5–1.4)
EST. GFR  (AFRICAN AMERICAN): >60 ML/MIN/1.73 M^2
EST. GFR  (NON AFRICAN AMERICAN): >60 ML/MIN/1.73 M^2
GLUCOSE SERPL-MCNC: 87 MG/DL (ref 70–110)
POTASSIUM SERPL-SCNC: 4.2 MMOL/L (ref 3.5–5.1)
SODIUM SERPL-SCNC: 139 MMOL/L (ref 136–145)

## 2022-05-17 PROCEDURE — 80048 BASIC METABOLIC PNL TOTAL CA: CPT | Performed by: FAMILY MEDICINE

## 2022-05-17 PROCEDURE — 36415 COLL VENOUS BLD VENIPUNCTURE: CPT | Performed by: FAMILY MEDICINE

## 2022-05-23 ENCOUNTER — PATIENT MESSAGE (OUTPATIENT)
Dept: OTHER | Facility: OTHER | Age: 67
End: 2022-05-23
Payer: MEDICARE

## 2022-06-08 ENCOUNTER — PATIENT MESSAGE (OUTPATIENT)
Dept: UROLOGY | Facility: CLINIC | Age: 67
End: 2022-06-08
Payer: MEDICARE

## 2022-06-21 ENCOUNTER — LAB VISIT (OUTPATIENT)
Dept: LAB | Facility: HOSPITAL | Age: 67
End: 2022-06-21
Attending: FAMILY MEDICINE
Payer: MEDICARE

## 2022-06-21 DIAGNOSIS — I10 ESSENTIAL HYPERTENSION: ICD-10-CM

## 2022-06-21 DIAGNOSIS — R97.20 ELEVATED PSA: ICD-10-CM

## 2022-06-21 LAB
ALBUMIN SERPL BCP-MCNC: 3.7 G/DL (ref 3.5–5.2)
ALP SERPL-CCNC: 78 U/L (ref 55–135)
ALT SERPL W/O P-5'-P-CCNC: 21 U/L (ref 10–44)
ANION GAP SERPL CALC-SCNC: 5 MMOL/L (ref 8–16)
AST SERPL-CCNC: 20 U/L (ref 10–40)
BILIRUB SERPL-MCNC: 0.6 MG/DL (ref 0.1–1)
BUN SERPL-MCNC: 24 MG/DL (ref 8–23)
CALCIUM SERPL-MCNC: 9.5 MG/DL (ref 8.7–10.5)
CHLORIDE SERPL-SCNC: 105 MMOL/L (ref 95–110)
CHOLEST SERPL-MCNC: 143 MG/DL (ref 120–199)
CHOLEST/HDLC SERPL: 4.2 {RATIO} (ref 2–5)
CO2 SERPL-SCNC: 30 MMOL/L (ref 23–29)
COMPLEXED PSA SERPL-MCNC: 0.01 NG/ML (ref 0–4)
CREAT SERPL-MCNC: 0.8 MG/DL (ref 0.5–1.4)
EST. GFR  (AFRICAN AMERICAN): >60 ML/MIN/1.73 M^2
EST. GFR  (NON AFRICAN AMERICAN): >60 ML/MIN/1.73 M^2
GLUCOSE SERPL-MCNC: 94 MG/DL (ref 70–110)
HDLC SERPL-MCNC: 34 MG/DL (ref 40–75)
HDLC SERPL: 23.8 % (ref 20–50)
LDLC SERPL CALC-MCNC: 96.6 MG/DL (ref 63–159)
NONHDLC SERPL-MCNC: 109 MG/DL
POTASSIUM SERPL-SCNC: 4.5 MMOL/L (ref 3.5–5.1)
PROT SERPL-MCNC: 7.2 G/DL (ref 6–8.4)
SODIUM SERPL-SCNC: 140 MMOL/L (ref 136–145)
TRIGL SERPL-MCNC: 62 MG/DL (ref 30–150)

## 2022-06-21 PROCEDURE — 36415 COLL VENOUS BLD VENIPUNCTURE: CPT | Performed by: FAMILY MEDICINE

## 2022-06-21 PROCEDURE — 84153 ASSAY OF PSA TOTAL: CPT | Performed by: FAMILY MEDICINE

## 2022-06-21 PROCEDURE — 80061 LIPID PANEL: CPT | Performed by: FAMILY MEDICINE

## 2022-06-21 PROCEDURE — 80053 COMPREHEN METABOLIC PANEL: CPT | Performed by: FAMILY MEDICINE

## 2022-06-22 ENCOUNTER — PATIENT MESSAGE (OUTPATIENT)
Dept: UROLOGY | Facility: CLINIC | Age: 67
End: 2022-06-22
Payer: MEDICARE

## 2022-06-27 ENCOUNTER — OFFICE VISIT (OUTPATIENT)
Dept: INTERNAL MEDICINE | Facility: CLINIC | Age: 67
End: 2022-06-27
Payer: MEDICARE

## 2022-06-27 VITALS
WEIGHT: 215.38 LBS | OXYGEN SATURATION: 99 % | DIASTOLIC BLOOD PRESSURE: 82 MMHG | SYSTOLIC BLOOD PRESSURE: 119 MMHG | BODY MASS INDEX: 30.83 KG/M2 | TEMPERATURE: 98 F | HEIGHT: 70 IN | HEART RATE: 75 BPM

## 2022-06-27 DIAGNOSIS — C61 MALIGNANT NEOPLASM OF PROSTATE: ICD-10-CM

## 2022-06-27 DIAGNOSIS — I10 PRIMARY HYPERTENSION: ICD-10-CM

## 2022-06-27 DIAGNOSIS — Z00.00 ROUTINE HEALTH MAINTENANCE: Primary | ICD-10-CM

## 2022-06-27 PROCEDURE — 1101F PR PT FALLS ASSESS DOC 0-1 FALLS W/OUT INJ PAST YR: ICD-10-PCS | Mod: CPTII,S$GLB,, | Performed by: FAMILY MEDICINE

## 2022-06-27 PROCEDURE — 99999 PR PBB SHADOW E&M-EST. PATIENT-LVL III: ICD-10-PCS | Mod: PBBFAC,,, | Performed by: FAMILY MEDICINE

## 2022-06-27 PROCEDURE — 3008F BODY MASS INDEX DOCD: CPT | Mod: CPTII,S$GLB,, | Performed by: FAMILY MEDICINE

## 2022-06-27 PROCEDURE — 3074F PR MOST RECENT SYSTOLIC BLOOD PRESSURE < 130 MM HG: ICD-10-PCS | Mod: CPTII,S$GLB,, | Performed by: FAMILY MEDICINE

## 2022-06-27 PROCEDURE — 1126F AMNT PAIN NOTED NONE PRSNT: CPT | Mod: CPTII,S$GLB,, | Performed by: FAMILY MEDICINE

## 2022-06-27 PROCEDURE — 99999 PR PBB SHADOW E&M-EST. PATIENT-LVL III: CPT | Mod: PBBFAC,,, | Performed by: FAMILY MEDICINE

## 2022-06-27 PROCEDURE — 3080F PR MOST RECENT DIASTOLIC BLOOD PRESSURE >= 90 MM HG: ICD-10-PCS | Mod: CPTII,S$GLB,, | Performed by: FAMILY MEDICINE

## 2022-06-27 PROCEDURE — 3288F FALL RISK ASSESSMENT DOCD: CPT | Mod: CPTII,S$GLB,, | Performed by: FAMILY MEDICINE

## 2022-06-27 PROCEDURE — 4010F ACE/ARB THERAPY RXD/TAKEN: CPT | Mod: CPTII,S$GLB,, | Performed by: FAMILY MEDICINE

## 2022-06-27 PROCEDURE — 3080F DIAST BP >= 90 MM HG: CPT | Mod: CPTII,S$GLB,, | Performed by: FAMILY MEDICINE

## 2022-06-27 PROCEDURE — 4010F PR ACE/ARB THEARPY RXD/TAKEN: ICD-10-PCS | Mod: CPTII,S$GLB,, | Performed by: FAMILY MEDICINE

## 2022-06-27 PROCEDURE — 1101F PT FALLS ASSESS-DOCD LE1/YR: CPT | Mod: CPTII,S$GLB,, | Performed by: FAMILY MEDICINE

## 2022-06-27 PROCEDURE — 99397 PER PM REEVAL EST PAT 65+ YR: CPT | Mod: S$GLB,,, | Performed by: FAMILY MEDICINE

## 2022-06-27 PROCEDURE — 3288F PR FALLS RISK ASSESSMENT DOCUMENTED: ICD-10-PCS | Mod: CPTII,S$GLB,, | Performed by: FAMILY MEDICINE

## 2022-06-27 PROCEDURE — 3008F PR BODY MASS INDEX (BMI) DOCUMENTED: ICD-10-PCS | Mod: CPTII,S$GLB,, | Performed by: FAMILY MEDICINE

## 2022-06-27 PROCEDURE — 1159F PR MEDICATION LIST DOCUMENTED IN MEDICAL RECORD: ICD-10-PCS | Mod: CPTII,S$GLB,, | Performed by: FAMILY MEDICINE

## 2022-06-27 PROCEDURE — 99397 PR PREVENTIVE VISIT,EST,65 & OVER: ICD-10-PCS | Mod: S$GLB,,, | Performed by: FAMILY MEDICINE

## 2022-06-27 PROCEDURE — 3074F SYST BP LT 130 MM HG: CPT | Mod: CPTII,S$GLB,, | Performed by: FAMILY MEDICINE

## 2022-06-27 PROCEDURE — 1159F MED LIST DOCD IN RCRD: CPT | Mod: CPTII,S$GLB,, | Performed by: FAMILY MEDICINE

## 2022-06-27 PROCEDURE — 1126F PR PAIN SEVERITY QUANTIFIED, NO PAIN PRESENT: ICD-10-PCS | Mod: CPTII,S$GLB,, | Performed by: FAMILY MEDICINE

## 2022-06-27 RX ORDER — AMLODIPINE BESYLATE 5 MG/1
5 TABLET ORAL DAILY
Qty: 90 TABLET | Refills: 4 | Status: SHIPPED | OUTPATIENT
Start: 2022-06-27 | End: 2023-07-26

## 2022-06-27 NOTE — PROGRESS NOTES
Subjective:       Patient ID: Kike Smith is a  male.    Chief Complaint: Annual Exam    HPIhere for phys exam no c/o;  htn controlled and nl bps;baudilio statin; baudilio statin;ll. Hx 50 min daily stationary bike;digital med bps fine    fam hx bladder ca/hematuria nl w./u(brother bladder ca /smoker)renal cyst : nl hmaturia w/u yrs ago.     Prost ca utd urol      Chronic sinus issues hx ent dr delano astelin/flonase      Past Medical History:   Diagnosis Date    Allergy     BPH (benign prostatic hyperplasia)     History of colon polyps 1/25/2018    He has had colon polyps since he was in his 30's.  He has had multiple colonoscopies and has had adenomatous polyps.    History of fracture 9/14/2021    History of inguinal hernia repair 9/14/2021    Hyperlipidemia     Hypertension     Inguinal hernia     IPMN (intraductal papillary mucinous neoplasm)     annual eus or mri    Obesity     Prostate cancer     Squamous cell carcinoma skin of arm      r arm;s/p mohs     Past Surgical History:   Procedure Laterality Date    CLOSURE OF DEFECT OF MOHS PROCEDURE  07/10/2020    dr reynoso    COLONOSCOPY N/A 1/25/2018    Procedure: COLONOSCOPY;  Surgeon: Juan A Walter MD;  Location: [facility];  Service: Endoscopy;  Laterality: N/A;    ENDOSCOPIC ULTRASOUND OF UPPER GASTROINTESTINAL TRACT N/A 1/24/2022    Procedure: ULTRASOUND, ENDOSCOPIC, UPPER GI TRACT;  Surgeon: Daya Espinal MD;  Location: [facility];  Service: Endoscopy;  Laterality: N/A;    KNEE ARTHROSCOPY Left     ORIF TIBIA & FIBULA FRACTURES Left 08/10/2019    tib/fib fx    ROBOT-ASSISTED LAPAROSCOPIC PROSTATECTOMY USING DA ALEX XI N/A 9/20/2021    Procedure: XI ROBOTIC PROSTATECTOMY;  Surgeon: Fidencio Morton MD;  Location: [address];  Service: Urology;  Laterality: N/A;  3hr gen with regional    ROBOT-ASSISTED LAPAROSCOPIC REPAIR OF INGUINAL HERNIA USING DA ALEX XI Left 8/14/2020    Procedure: XI ROBOTIC REPAIR, HERNIA, INGUINAL;   Surgeon: Mark Anthony Araujo MD;  Location: [facility] OR;  Service: General;  Laterality: Left;    ROBOT-ASSISTED LYMPHADENECTOMY N/A 9/20/2021    Procedure: ROBOTIC LYMPHADENECTOMY;  Surgeon: Fidencio Morton MD;  Location: [facility] OR [address] [place] [address];  Service: Urology;  Laterality: N/A;     Family History   Problem Relation Age of Onset    Macular degeneration Maternal Uncle     Bladder Cancer Brother         smoker    Cataracts Mother     Fanconi anemia Mother     Leukemia Father 80    Coronary artery disease Father     Heart attack Father 72    Multiple myeloma Sister 59     Social History     Socioeconomic History    Marital status:    Tobacco Use    Smoking status: Never Smoker    Smokeless tobacco: Never Used   Substance and Sexual Activity    Alcohol use: No     Comment: quit 1983    Drug use: Never   Social History Narrative     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      Lost 35 y/o son to drug overdose summer 16     Social Determinants of Health     Financial Resource Strain: Low Risk     Difficulty of Paying Living Expenses: Not hard at all   Food Insecurity: No Food Insecurity    Worried About Running Out of Food in the Last Year: Never true    Ran Out of Food in the Last Year: Never true   Transportation Needs: No Transportation Needs    Lack of Transportation (Medical): No    Lack of Transportation (Non-Medical): No   Physical Activity: Sufficiently Active    Days of Exercise per Week: 5 days    Minutes of Exercise per Session: 50 min   Stress: No Stress Concern Present    Feeling of Stress : Only a little   Social Connections: Unknown    Frequency of Communication with Friends and Family: More than three times a week    Frequency of Social Gatherings with Friends and Family: More than three times a week    Active Member of Clubs or Organizations: No    Attends Club or Organization Meetings: Never    Marital Status:    Housing  Stability: Low Risk     Unable to Pay for Housing in the Last Year: No    Number of Places Lived in the Last Year: 1    Unstable Housing in the Last Year: No         Review of Systems  Cardiovascular: no chest pain  Chest: no shortness of breath  Abd: no abd pain  Remainder review of systems negative    Objective:      Physical Exam   Constitutional: He is oriented to person, place, and time. He appears well-developed and well-nourished.   HENT:   Head: Normocephalic and atraumatic.   Right Ear: External ear normal.   Left Ear: External ear normal.   Nose: Nose normal.   Mouth/Throat: Oropharynx is clear and moist.   Nl tms   Eyes: Conjunctivae and EOM are normal. Pupils are equal, round, and reactive to light. No scleral icterus.   Neck: Normal range of motion. Neck supple. Carotid bruit is not present.   Cardiovascular: Normal rate, regular rhythm and normal heart sounds.  Exam reveals no gallop and no friction rub.    No murmur heard.  Pulmonary/Chest: Effort normal and breath sounds normal. He has no wheezes.   Abdominal: Soft. Bowel sounds are normal. He exhibits no distension and no mass. There is no hepatosplenomegaly. There is no tenderness. There is no rebound and no guarding.   Musculoskeletal: Normal range of motion. He exhibits no tenderness.   Lymphadenopathy:     He has no cervical adenopathy.   Neurological: He is alert and oriented to person, place, and time. No cranial nerve deficit. Coordination normal.   Skin: Skin is warm and dry. No rash noted. No erythema.   Psychiatric: He has a normal mood and affect. His behavior is normal. Judgment and thought content normal.   Nursing note and vitals reviewed.      Assessment:       1. Routine health maintenance    htn  Hx prost ca    Plan:       urol when due  Lab 12 months and follow up after    Routine health maintenance    Primary hypertension  -     Comprehensive Metabolic Panel; Future; Expected date: 06/27/2023  -     Lipid Panel; Future;  Expected date: 06/27/2023    Malignant neoplasm of prostate    Other orders  -     amLODIPine (NORVASC) 5 MG tablet; Take 1 tablet (5 mg total) by mouth once daily.  Dispense: 90 tablet; Refill: 4

## 2022-07-11 ENCOUNTER — PATIENT MESSAGE (OUTPATIENT)
Dept: OTHER | Facility: OTHER | Age: 67
End: 2022-07-11
Payer: MEDICARE

## 2022-07-12 ENCOUNTER — TELEPHONE (OUTPATIENT)
Dept: UROLOGY | Facility: CLINIC | Age: 67
End: 2022-07-12
Payer: MEDICARE

## 2022-07-14 ENCOUNTER — TELEPHONE (OUTPATIENT)
Dept: DERMATOLOGY | Facility: CLINIC | Age: 67
End: 2022-07-14
Payer: MEDICARE

## 2022-07-14 NOTE — TELEPHONE ENCOUNTER
Patient call and notified that Dr. Menchaca had a family emergency today. Today's appointment rescheduled with Dr. Evans for next week. Pt verbalized understanding.

## 2022-07-20 ENCOUNTER — OFFICE VISIT (OUTPATIENT)
Dept: DERMATOLOGY | Facility: CLINIC | Age: 67
End: 2022-07-20
Payer: MEDICARE

## 2022-07-20 DIAGNOSIS — L82.1 SEBORRHEIC KERATOSES: ICD-10-CM

## 2022-07-20 DIAGNOSIS — D18.01 ANGIOMA OF SKIN: ICD-10-CM

## 2022-07-20 DIAGNOSIS — L57.0 ACTINIC KERATOSIS: Primary | ICD-10-CM

## 2022-07-20 DIAGNOSIS — Z85.828 HISTORY OF NONMELANOMA SKIN CANCER: ICD-10-CM

## 2022-07-20 PROCEDURE — 4010F ACE/ARB THERAPY RXD/TAKEN: CPT | Mod: CPTII,S$GLB,, | Performed by: STUDENT IN AN ORGANIZED HEALTH CARE EDUCATION/TRAINING PROGRAM

## 2022-07-20 PROCEDURE — 1101F PT FALLS ASSESS-DOCD LE1/YR: CPT | Mod: CPTII,S$GLB,, | Performed by: STUDENT IN AN ORGANIZED HEALTH CARE EDUCATION/TRAINING PROGRAM

## 2022-07-20 PROCEDURE — 3288F FALL RISK ASSESSMENT DOCD: CPT | Mod: CPTII,S$GLB,, | Performed by: STUDENT IN AN ORGANIZED HEALTH CARE EDUCATION/TRAINING PROGRAM

## 2022-07-20 PROCEDURE — 1126F AMNT PAIN NOTED NONE PRSNT: CPT | Mod: CPTII,S$GLB,, | Performed by: STUDENT IN AN ORGANIZED HEALTH CARE EDUCATION/TRAINING PROGRAM

## 2022-07-20 PROCEDURE — 1160F RVW MEDS BY RX/DR IN RCRD: CPT | Mod: CPTII,S$GLB,, | Performed by: STUDENT IN AN ORGANIZED HEALTH CARE EDUCATION/TRAINING PROGRAM

## 2022-07-20 PROCEDURE — 99999 PR PBB SHADOW E&M-EST. PATIENT-LVL III: CPT | Mod: PBBFAC,,, | Performed by: STUDENT IN AN ORGANIZED HEALTH CARE EDUCATION/TRAINING PROGRAM

## 2022-07-20 PROCEDURE — 1126F PR PAIN SEVERITY QUANTIFIED, NO PAIN PRESENT: ICD-10-PCS | Mod: CPTII,S$GLB,, | Performed by: STUDENT IN AN ORGANIZED HEALTH CARE EDUCATION/TRAINING PROGRAM

## 2022-07-20 PROCEDURE — 1159F PR MEDICATION LIST DOCUMENTED IN MEDICAL RECORD: ICD-10-PCS | Mod: CPTII,S$GLB,, | Performed by: STUDENT IN AN ORGANIZED HEALTH CARE EDUCATION/TRAINING PROGRAM

## 2022-07-20 PROCEDURE — 17000 PR DESTRUCTION(LASER SURGERY,CRYOSURGERY,CHEMOSURGERY),PREMALIGNANT LESIONS,FIRST LESION: ICD-10-PCS | Mod: S$GLB,,, | Performed by: STUDENT IN AN ORGANIZED HEALTH CARE EDUCATION/TRAINING PROGRAM

## 2022-07-20 PROCEDURE — 99999 PR PBB SHADOW E&M-EST. PATIENT-LVL III: ICD-10-PCS | Mod: PBBFAC,,, | Performed by: STUDENT IN AN ORGANIZED HEALTH CARE EDUCATION/TRAINING PROGRAM

## 2022-07-20 PROCEDURE — 1101F PR PT FALLS ASSESS DOC 0-1 FALLS W/OUT INJ PAST YR: ICD-10-PCS | Mod: CPTII,S$GLB,, | Performed by: STUDENT IN AN ORGANIZED HEALTH CARE EDUCATION/TRAINING PROGRAM

## 2022-07-20 PROCEDURE — 3288F PR FALLS RISK ASSESSMENT DOCUMENTED: ICD-10-PCS | Mod: CPTII,S$GLB,, | Performed by: STUDENT IN AN ORGANIZED HEALTH CARE EDUCATION/TRAINING PROGRAM

## 2022-07-20 PROCEDURE — 99213 OFFICE O/P EST LOW 20 MIN: CPT | Mod: 25,S$GLB,, | Performed by: STUDENT IN AN ORGANIZED HEALTH CARE EDUCATION/TRAINING PROGRAM

## 2022-07-20 PROCEDURE — 99213 PR OFFICE/OUTPT VISIT, EST, LEVL III, 20-29 MIN: ICD-10-PCS | Mod: 25,S$GLB,, | Performed by: STUDENT IN AN ORGANIZED HEALTH CARE EDUCATION/TRAINING PROGRAM

## 2022-07-20 PROCEDURE — 1159F MED LIST DOCD IN RCRD: CPT | Mod: CPTII,S$GLB,, | Performed by: STUDENT IN AN ORGANIZED HEALTH CARE EDUCATION/TRAINING PROGRAM

## 2022-07-20 PROCEDURE — 4010F PR ACE/ARB THEARPY RXD/TAKEN: ICD-10-PCS | Mod: CPTII,S$GLB,, | Performed by: STUDENT IN AN ORGANIZED HEALTH CARE EDUCATION/TRAINING PROGRAM

## 2022-07-20 PROCEDURE — 17003 DESTRUCTION, PREMALIGNANT LESIONS; SECOND THROUGH 14 LESIONS: ICD-10-PCS | Mod: S$GLB,,, | Performed by: STUDENT IN AN ORGANIZED HEALTH CARE EDUCATION/TRAINING PROGRAM

## 2022-07-20 PROCEDURE — 17000 DESTRUCT PREMALG LESION: CPT | Mod: S$GLB,,, | Performed by: STUDENT IN AN ORGANIZED HEALTH CARE EDUCATION/TRAINING PROGRAM

## 2022-07-20 PROCEDURE — 1160F PR REVIEW ALL MEDS BY PRESCRIBER/CLIN PHARMACIST DOCUMENTED: ICD-10-PCS | Mod: CPTII,S$GLB,, | Performed by: STUDENT IN AN ORGANIZED HEALTH CARE EDUCATION/TRAINING PROGRAM

## 2022-07-20 PROCEDURE — 17003 DESTRUCT PREMALG LES 2-14: CPT | Mod: S$GLB,,, | Performed by: STUDENT IN AN ORGANIZED HEALTH CARE EDUCATION/TRAINING PROGRAM

## 2022-07-20 NOTE — PROGRESS NOTES
Subjective:       Patient ID:  Kike Smith is a 66 y.o. male who presents for   Chief Complaint   Patient presents with    Skin Check     Patient presents for follow-up of a skin examination. This is a high risk patient here to check for the development of new lesions. Hx of SCC of the right forearm (s/p Mohs by Dr. 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 Menchaca on 12/1/21. Has a few scaly, crusted lesions on the temple area, and a possible lesion near surgical area of SCC on the arm. Otherwise, denies bleeding, tender, growing, or concerning lesions.       Review of Systems   Constitutional: Negative for fever and chills.   Skin: Negative for itching, rash and dry skin.        Objective:    Physical Exam   Constitutional: He appears well-developed and well-nourished. No distress.   Neurological: He is alert and oriented to person, place, and time. He is not disoriented.   Psychiatric: He has a normal mood and affect.   Skin:   Areas Examined (abnormalities noted in diagram):   Scalp / Hair Palpated and Inspected  Head / Face Inspection Performed  Neck Inspection Performed  Chest / Axilla Inspection Performed  Abdomen Inspection Performed  Back Inspection Performed  RUE Inspected  LUE Inspection Performed  RLE Inspected  LLE Inspection Performed  Nails and Digits Inspection Performed                   Diagram Legend     Erythematous scaling macule/papule c/w actinic keratosis       Vascular papule c/w angioma      Pigmented verrucoid papule/plaque c/w seborrheic keratosis      Yellow umbilicated papule c/w sebaceous hyperplasia      Irregularly shaped tan macule c/w lentigo     1-2 mm smooth white papules consistent with Milia      Movable subcutaneous cyst with punctum c/w epidermal inclusion cyst      Subcutaneous movable cyst c/w pilar cyst      Firm pink to brown papule c/w  dermatofibroma      Pedunculated fleshy papule(s) c/w skin tag(s)      Evenly pigmented macule c/w junctional nevus     Mildly variegated pigmented, slightly irregular-bordered macule c/w mildly atypical nevus      Flesh colored to evenly pigmented papule c/w intradermal nevus       Pink pearly papule/plaque c/w basal cell carcinoma      Erythematous hyperkeratotic cursted plaque c/w SCC      Surgical scar with no sign of skin cancer recurrence      Open and closed comedones      Inflammatory papules and pustules      Verrucoid papule consistent consistent with wart     Erythematous eczematous patches and plaques     Dystrophic onycholytic nail with subungual debris c/w onychomycosis     Umbilicated papule    Erythematous-base heme-crusted tan verrucoid plaque consistent with inflamed seborrheic keratosis     Erythematous Silvery Scaling Plaque c/w Psoriasis     See annotation      Assessment / Plan:        Actinic keratosis  Cryosurgery Procedure Note    Verbal consent from the patient is obtained including, but not limited to, risk of hypopigmentation/hyperpigmentation, scar, recurrence of lesion. The patient is aware of the precancerous quality and need for treatment of these lesions. Liquid nitrogen cryosurgery is applied to the 3 actinic keratoses, as detailed in the physical exam, to produce a freeze injury. The patient is aware that blisters may form and is instructed on wound care with gentle cleansing and use of vaseline ointment to keep moist until healed. The patient is supplied a handout on cryosurgery and is instructed to call if lesions do not completely resolve.    Seborrheic keratoses  These are benign inherited growths without a malignant potential. Reassurance given to patient. No treatment is necessary.     Angioma of skin  This is a benign vascular lesion. Reassurance given. No treatment required.     History of nonmelanoma skin cancer  Area(s) of previous NMSC evaluated with no signs of  recurrence.    total body skin examination performed today including at least 12 points as noted in physical examination. No lesions suspicious for malignancy noted.  Reassurance provided.    Instructed patient to observe lesion(s) for changes and follow up in clinic if changes are noted. Patient to monitor skin at home for new or changing lesions and follow up in clinic if noted.           Follow up in about 6 months (around 1/20/2023).

## 2022-08-04 NOTE — PROGRESS NOTES
Clinic Note  8/4/2022      Subjective:         Chief Complaint:   HPI  Kike Smith is a 66 y.o. male  diagnosed with [id number] prostate cancer. Consult from Dr Darling.  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  Potent and continent prior to surgery. RALP (robotic assisted laparoscopic prostatectomy) 9/20/2021- path showed 1+ LN. [id number], [place] 4+3(GG3).  Reviewed path at last visit. Discussed N1 disease. Discussed observation, ADT +/- EBRT. Patient interested in pursuing surveillance.  Has MICHAELA, using 4-5 minipads/day. Doing kegel exercises 4x10. Symptoms worsened by HCTZ, will discuss with PCP.  Has ED, sees Dr. Chen. Continuing cialis.     FH -negative  PSA - 5.7  Stage - T1C  Volume - 17 ccs TRUS  MRI - n/a  Biopsy - 7/19/2021- [place] 4+3 left apex 1/2 40%; [place] 3+4 left base 1/2 20%, right apex 2/2 40%, right middle 1/2 30%; [place] 6 2/2 30%. Overall 7/12 cores positive.  Bone scan-negative  CT scan-negative  MOON Score - 5 intermediate risk  NCCN - Unfavorable intermediate  Germline testing - not indicated     3/18/2022- PSA 0.01  Has been working with PT. Continence improved, only leaks with cough.  Stopped Cialis due to GERD.    8/5/2022- PSA 0.01-6/21/2022. Has been working on hypertension control.      Lab Results   Component Value Date    PSA 0.01 06/21/2022    PSA 5.5 (H) 06/18/2021    PSA 4.1 (H) 07/21/2020    PSA 3.9 03/19/2019    PSA 3.8 03/02/2018    PSA 3.7 09/20/2017    PSA 3.7 02/20/2017    PSA 2.6 08/23/2016    PSA 3.3 12/29/2015    PSA 2.3 10/01/2014    PSADIAG 0.01 03/14/2022    PSADIAG 0.01 11/02/2021    PSADIAG 5.7 (H) 07/12/2021      Past Medical History:   Diagnosis Date    Allergy     BPH (benign prostatic hyperplasia)     History of colon polyps 01/25/2018    He has had colon polyps since he was in his 30's.  He has had multiple colonoscopies and has had adenomatous polyps.    History of fracture 09/14/2021    History of inguinal hernia repair 09/14/2021     Hyperlipidemia     Hypertension     Inguinal hernia     IPMN (intraductal papillary mucinous neoplasm)     annual eus or mri    Obesity     MERRITT on CPAP     via ent dr wick 2022    Prostate cancer     Squamous cell carcinoma skin of arm      r arm;s/p mohs     Family History   Problem Relation Age of Onset    Macular degeneration Maternal Uncle     Bladder Cancer Brother         smoker    Cataracts Mother     Fanconi anemia Mother     Leukemia Father 80    Coronary artery disease Father     Heart attack Father 72    Multiple myeloma Sister 59     Social History     Socioeconomic History    Marital status:    Tobacco Use    Smoking status: Never Smoker    Smokeless tobacco: Never Used   Substance and Sexual Activity    Alcohol use: No     Comment: quit 1983    Drug use: Never   Social History Narrative     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 16     Social Determinants of Health     Financial Resource Strain: Low Risk     Difficulty of Paying Living Expenses: Not hard at all   Food Insecurity: No Food Insecurity    Worried About Running Out of Food in the Last Year: Never true    Ran Out of Food in the Last Year: Never true   Transportation Needs: No Transportation Needs    Lack of Transportation (Medical): No    Lack of Transportation (Non-Medical): No   Physical Activity: Sufficiently Active    Days of Exercise per Week: 5 days    Minutes of Exercise per Session: 50 min   Stress: No Stress Concern Present    Feeling of Stress : Only a little   Social Connections: Unknown    Frequency of Communication with Friends and Family: More than three times a week    Frequency of Social Gatherings with Friends and Family: More than three times a week    Active Member of Clubs or Organizations: No    Attends Club or Organization Meetings: Never    Marital Status:    Housing  Stability: Low Risk     Unable to Pay for Housing in the Last Year: No    Number of Places Lived in the Last Year: 1    Unstable Housing in the Last Year: No     Past Surgical History:   Procedure Laterality Date    CLOSURE OF DEFECT OF MOHS PROCEDURE  07/10/2020    dr reynoso    COLONOSCOPY N/A 1/25/2018    Procedure: COLONOSCOPY;  Surgeon: Juan A Walter MD;  Location: [facility];  Service: Endoscopy;  Laterality: N/A;    ENDOSCOPIC ULTRASOUND OF UPPER GASTROINTESTINAL TRACT N/A 1/24/2022    Procedure: ULTRASOUND, ENDOSCOPIC, UPPER GI TRACT;  Surgeon: Daya Espinal MD;  Location: [facility] ENDO;  Service: Endoscopy;  Laterality: N/A;    KNEE ARTHROSCOPY Left     ORIF TIBIA & FIBULA FRACTURES Left 08/10/2019    tib/fib fx    ROBOT-ASSISTED LAPAROSCOPIC PROSTATECTOMY USING DA ALEX XI N/A 9/20/2021    Procedure: XI ROBOTIC PROSTATECTOMY;  Surgeon: Fidencio Morton MD;  Location: [facility] OR [address] [place] [address];  Service: Urology;  Laterality: N/A;  3hr gen with regional    ROBOT-ASSISTED LAPAROSCOPIC REPAIR OF INGUINAL HERNIA USING DA ALEX XI Left 8/14/2020    Procedure: XI ROBOTIC REPAIR, HERNIA, INGUINAL;  Surgeon: Mark Anthony Araujo MD;  Location: [facility];  Service: General;  Laterality: Left;    ROBOT-ASSISTED LYMPHADENECTOMY N/A 9/20/2021    Procedure: ROBOTIC LYMPHADENECTOMY;  Surgeon: Fidencio Morton MD;  [facility];  Service: Urology;  Laterality: N/A;     Patient Active Problem List   Diagnosis    Hypertension    Epididymal cyst    Hyperlipidemia    Renal cyst    Malignant neoplasm of prostate    IPMN (intraductal papillary mucinous neoplasm)    Long term (current) use of antithrombotics/antiplatelets    Allergies    Overweight    Abnormal EKG    Snoring    Edema    Erectile dysfunction following radical prostatectomy    Decreased strength, endurance, and mobility    Urine incontinence    Pelvic floor weakness, male     Review of Systems   Constitutional: Negative for  "appetite change, chills, fatigue, fever and unexpected weight change.   HENT: Negative for nosebleeds.    Respiratory: Negative for shortness of breath and wheezing.    Cardiovascular: Negative for chest pain, palpitations and leg swelling.   Gastrointestinal: Negative for abdominal distention, abdominal pain, constipation, diarrhea, nausea and vomiting.   Genitourinary: Negative for dysuria and hematuria.   Musculoskeletal: Negative for arthralgias and back pain.   Skin: Negative for pallor.   Neurological: Negative for dizziness, seizures and syncope.   Hematological: Negative for adenopathy.   Psychiatric/Behavioral: Negative for dysphoric mood.         Objective:      There were no vitals taken for this visit.  Estimated body mass index is 30.91 kg/m² as calculated from the following:    Height as of 6/27/22: 5' 10" (1.778 m).    Weight as of 6/27/22: 97.7 kg (215 lb 6.2 oz).  Physical Exam  Constitutional:       General: He is not in acute distress.     Appearance: He is well-developed. He is not diaphoretic.   HENT:      Head: Atraumatic.   Neck:      Trachea: No tracheal deviation.   Cardiovascular:      Rate and Rhythm: Normal rate.   Pulmonary:      Effort: Pulmonary effort is normal. No respiratory distress.      Breath sounds: No wheezing.   Abdominal:      General: Bowel sounds are normal. There is no distension.      Palpations: Abdomen is soft. There is no mass.      Tenderness: There is no abdominal tenderness. There is no guarding or rebound.   Skin:     General: Skin is warm and dry.   Neurological:      Mental Status: He is alert and oriented to person, place, and time.   Psychiatric:         Behavior: Behavior normal.         Thought Content: Thought content normal.         Judgment: Judgment normal.           Assessment and Plan:           Problem List Items Addressed This Visit     Malignant neoplasm of prostate - Primary          Follow up:   Discussed options at length. Discussed weight loss, " diet and exercise.  Letter to Dr Reyna.  6 month with PSA     Fidencio Morton

## 2022-08-05 ENCOUNTER — OFFICE VISIT (OUTPATIENT)
Dept: UROLOGY | Facility: CLINIC | Age: 67
End: 2022-08-05
Payer: MEDICARE

## 2022-08-05 VITALS
WEIGHT: 213.38 LBS | TEMPERATURE: 98 F | BODY MASS INDEX: 30.55 KG/M2 | DIASTOLIC BLOOD PRESSURE: 79 MMHG | OXYGEN SATURATION: 97 % | HEIGHT: 70 IN | RESPIRATION RATE: 18 BRPM | HEART RATE: 76 BPM | SYSTOLIC BLOOD PRESSURE: 119 MMHG

## 2022-08-05 DIAGNOSIS — C61 MALIGNANT NEOPLASM OF PROSTATE: Primary | ICD-10-CM

## 2022-08-05 PROCEDURE — 1159F PR MEDICATION LIST DOCUMENTED IN MEDICAL RECORD: ICD-10-PCS | Mod: CPTII,S$GLB,, | Performed by: UROLOGY

## 2022-08-05 PROCEDURE — 3288F PR FALLS RISK ASSESSMENT DOCUMENTED: ICD-10-PCS | Mod: CPTII,S$GLB,, | Performed by: UROLOGY

## 2022-08-05 PROCEDURE — 3078F DIAST BP <80 MM HG: CPT | Mod: CPTII,S$GLB,, | Performed by: UROLOGY

## 2022-08-05 PROCEDURE — 99999 PR PBB SHADOW E&M-EST. PATIENT-LVL III: CPT | Mod: PBBFAC,,, | Performed by: UROLOGY

## 2022-08-05 PROCEDURE — 1159F MED LIST DOCD IN RCRD: CPT | Mod: CPTII,S$GLB,, | Performed by: UROLOGY

## 2022-08-05 PROCEDURE — 3008F PR BODY MASS INDEX (BMI) DOCUMENTED: ICD-10-PCS | Mod: CPTII,S$GLB,, | Performed by: UROLOGY

## 2022-08-05 PROCEDURE — 1101F PT FALLS ASSESS-DOCD LE1/YR: CPT | Mod: CPTII,S$GLB,, | Performed by: UROLOGY

## 2022-08-05 PROCEDURE — 3074F SYST BP LT 130 MM HG: CPT | Mod: CPTII,S$GLB,, | Performed by: UROLOGY

## 2022-08-05 PROCEDURE — 99999 PR PBB SHADOW E&M-EST. PATIENT-LVL III: ICD-10-PCS | Mod: PBBFAC,,, | Performed by: UROLOGY

## 2022-08-05 PROCEDURE — 99213 OFFICE O/P EST LOW 20 MIN: CPT | Mod: S$GLB,,, | Performed by: UROLOGY

## 2022-08-05 PROCEDURE — 3074F PR MOST RECENT SYSTOLIC BLOOD PRESSURE < 130 MM HG: ICD-10-PCS | Mod: CPTII,S$GLB,, | Performed by: UROLOGY

## 2022-08-05 PROCEDURE — 3078F PR MOST RECENT DIASTOLIC BLOOD PRESSURE < 80 MM HG: ICD-10-PCS | Mod: CPTII,S$GLB,, | Performed by: UROLOGY

## 2022-08-05 PROCEDURE — 1101F PR PT FALLS ASSESS DOC 0-1 FALLS W/OUT INJ PAST YR: ICD-10-PCS | Mod: CPTII,S$GLB,, | Performed by: UROLOGY

## 2022-08-05 PROCEDURE — 1126F AMNT PAIN NOTED NONE PRSNT: CPT | Mod: CPTII,S$GLB,, | Performed by: UROLOGY

## 2022-08-05 PROCEDURE — 4010F ACE/ARB THERAPY RXD/TAKEN: CPT | Mod: CPTII,S$GLB,, | Performed by: UROLOGY

## 2022-08-05 PROCEDURE — 1126F PR PAIN SEVERITY QUANTIFIED, NO PAIN PRESENT: ICD-10-PCS | Mod: CPTII,S$GLB,, | Performed by: UROLOGY

## 2022-08-05 PROCEDURE — 99213 PR OFFICE/OUTPT VISIT, EST, LEVL III, 20-29 MIN: ICD-10-PCS | Mod: S$GLB,,, | Performed by: UROLOGY

## 2022-08-05 PROCEDURE — 3288F FALL RISK ASSESSMENT DOCD: CPT | Mod: CPTII,S$GLB,, | Performed by: UROLOGY

## 2022-08-05 PROCEDURE — 4010F PR ACE/ARB THEARPY RXD/TAKEN: ICD-10-PCS | Mod: CPTII,S$GLB,, | Performed by: UROLOGY

## 2022-08-05 PROCEDURE — 3008F BODY MASS INDEX DOCD: CPT | Mod: CPTII,S$GLB,, | Performed by: UROLOGY

## 2022-08-05 NOTE — LETTER
August 5, 2022        Anmol Reyna MD  [address] [facility]  [place] [id number]             [facility]  [address] [place] [address] [address] [place] [address]  Phone: [phone]  Fax: [phone]   Patient: Kike Smith   MR Number: [id number]   YOB: 1955   Date of Visit: 8/5/2022       Dear Dr. Reyna:    Thank you for referring Kike Smith to me for evaluation. Attached you will find relevant portions of my assessment and plan of care.    If you have questions, please do not hesitate to call me. I look forward to following Kike Smith along with you.    Sincerely,      Fidencio Morton MD            CC    No Recipients    Enclosure

## 2022-11-16 NOTE — TELEPHONE ENCOUNTER
No new care gaps identified.  [facility] Embedded Care Gaps. Reference number: [id number]. 11/16/2022   5:02:34 PM CST

## 2022-11-17 RX ORDER — PRAVASTATIN SODIUM 40 MG/1
TABLET ORAL
Qty: 90 TABLET | Refills: 2 | Status: SHIPPED | OUTPATIENT
Start: 2022-11-17 | End: 2023-06-05

## 2022-11-17 NOTE — TELEPHONE ENCOUNTER
Refill Decision Note   Kike Luis  is requesting a refill authorization.  Brief Assessment and Rationale for Refill:  Approve     Medication Therapy Plan:       Medication Reconciliation Completed: No   Comments:     No Care Gaps recommended.     Note composed:5:04 AM 11/17/2022

## 2022-11-30 ENCOUNTER — NURSE TRIAGE (OUTPATIENT)
Dept: ADMINISTRATIVE | Facility: CLINIC | Age: 67
End: 2022-11-30
Payer: MEDICARE

## 2022-11-30 ENCOUNTER — LAB VISIT (OUTPATIENT)
Dept: LAB | Facility: HOSPITAL | Age: 67
End: 2022-11-30
Payer: MEDICARE

## 2022-11-30 ENCOUNTER — TELEPHONE (OUTPATIENT)
Dept: INTERNAL MEDICINE | Facility: CLINIC | Age: 67
End: 2022-11-30
Payer: MEDICARE

## 2022-11-30 DIAGNOSIS — N39.0 URINARY TRACT INFECTION WITHOUT HEMATURIA, SITE UNSPECIFIED: ICD-10-CM

## 2022-11-30 DIAGNOSIS — N39.0 URINARY TRACT INFECTION WITHOUT HEMATURIA, SITE UNSPECIFIED: Primary | ICD-10-CM

## 2022-11-30 LAB
BACTERIA #/AREA URNS HPF: ABNORMAL /HPF
BILIRUB UR QL STRIP: NEGATIVE
CLARITY UR: ABNORMAL
COLOR UR: YELLOW
GLUCOSE UR QL STRIP: NEGATIVE
HGB UR QL STRIP: ABNORMAL
KETONES UR QL STRIP: NEGATIVE
LEUKOCYTE ESTERASE UR QL STRIP: ABNORMAL
MICROSCOPIC COMMENT: ABNORMAL
NITRITE UR QL STRIP: NEGATIVE
PH UR STRIP: 6 [PH] (ref 5–8)
PROT UR QL STRIP: NEGATIVE
RBC #/AREA URNS HPF: 23 /HPF (ref 0–4)
SP GR UR STRIP: 1.02 (ref 1–1.03)
SQUAMOUS #/AREA URNS HPF: 0 /HPF
URN SPEC COLLECT METH UR: ABNORMAL
WBC #/AREA URNS HPF: >100 /HPF (ref 0–5)

## 2022-11-30 PROCEDURE — 81000 URINALYSIS NONAUTO W/SCOPE: CPT | Performed by: FAMILY MEDICINE

## 2022-11-30 RX ORDER — SULFAMETHOXAZOLE AND TRIMETHOPRIM 800; 160 MG/1; MG/1
1 TABLET ORAL 2 TIMES DAILY
Qty: 28 TABLET | Refills: 0 | Status: SHIPPED | OUTPATIENT
Start: 2022-11-30 | End: 2022-12-14

## 2022-11-30 NOTE — TELEPHONE ENCOUNTER
Pt states UA ordered today by PCP for urinary symptoms. Pt leaving to go out of town tomorrow, so orders placed & told once returned would escribe abx if needed. Pt received notification via [employer] of results & requesting abx as discussed previously with clinic.     Pt reports frequent urination, hazy, feeling like he has to go often. says empties bladder but has bladder spasms after like he still needs to go. symptoms started 2 days ago.     Notified Dr. Reyna. Per MD-escribe bactrim ds BID for 14 days. F/u urinalysis in 6 weeks with clinic & callback if symptoms not resolved in 2-3 days.     Rx placed, confirmed receipt delivery noted in med rec. Pt updated on rx MD advisement & callback if symptoms not resolved in 2-3 days. Pt vu.       Reason for Disposition   [1] Follow-up call from patient regarding patient's clinical status AND [2] information urgent    Protocols used: PCP Call - No Triage-A-

## 2022-11-30 NOTE — TELEPHONE ENCOUNTER
Pt requested medication for possible uti. Stat urinalysis order placed. Please see prescription to [facility].//olga lidiaw

## 2022-12-28 ENCOUNTER — OFFICE VISIT (OUTPATIENT)
Dept: INTERNAL MEDICINE | Facility: CLINIC | Age: 67
End: 2022-12-28
Payer: MEDICARE

## 2022-12-28 ENCOUNTER — LAB VISIT (OUTPATIENT)
Dept: LAB | Facility: HOSPITAL | Age: 67
End: 2022-12-28
Payer: MEDICARE

## 2022-12-28 VITALS
OXYGEN SATURATION: 95 % | DIASTOLIC BLOOD PRESSURE: 80 MMHG | HEART RATE: 75 BPM | WEIGHT: 214.75 LBS | TEMPERATURE: 97 F | HEIGHT: 70 IN | BODY MASS INDEX: 30.74 KG/M2 | SYSTOLIC BLOOD PRESSURE: 114 MMHG

## 2022-12-28 DIAGNOSIS — R35.0 URINARY FREQUENCY: Primary | ICD-10-CM

## 2022-12-28 DIAGNOSIS — R35.0 URINARY FREQUENCY: ICD-10-CM

## 2022-12-28 LAB
BILIRUB UR QL STRIP: NEGATIVE
CLARITY UR: CLEAR
COLOR UR: YELLOW
GLUCOSE UR QL STRIP: NEGATIVE
HGB UR QL STRIP: ABNORMAL
KETONES UR QL STRIP: NEGATIVE
LEUKOCYTE ESTERASE UR QL STRIP: NEGATIVE
MICROSCOPIC COMMENT: ABNORMAL
NITRITE UR QL STRIP: NEGATIVE
PH UR STRIP: 7 [PH] (ref 5–8)
PROT UR QL STRIP: NEGATIVE
RBC #/AREA URNS HPF: 12 /HPF (ref 0–4)
SP GR UR STRIP: 1.02 (ref 1–1.03)
URN SPEC COLLECT METH UR: ABNORMAL

## 2022-12-28 PROCEDURE — 1126F AMNT PAIN NOTED NONE PRSNT: CPT | Mod: CPTII,S$GLB,, | Performed by: PHYSICIAN ASSISTANT

## 2022-12-28 PROCEDURE — 3288F FALL RISK ASSESSMENT DOCD: CPT | Mod: CPTII,S$GLB,, | Performed by: PHYSICIAN ASSISTANT

## 2022-12-28 PROCEDURE — 1159F MED LIST DOCD IN RCRD: CPT | Mod: CPTII,S$GLB,, | Performed by: PHYSICIAN ASSISTANT

## 2022-12-28 PROCEDURE — 99213 OFFICE O/P EST LOW 20 MIN: CPT | Mod: S$GLB,,, | Performed by: PHYSICIAN ASSISTANT

## 2022-12-28 PROCEDURE — 1159F PR MEDICATION LIST DOCUMENTED IN MEDICAL RECORD: ICD-10-PCS | Mod: CPTII,S$GLB,, | Performed by: PHYSICIAN ASSISTANT

## 2022-12-28 PROCEDURE — 1101F PR PT FALLS ASSESS DOC 0-1 FALLS W/OUT INJ PAST YR: ICD-10-PCS | Mod: CPTII,S$GLB,, | Performed by: PHYSICIAN ASSISTANT

## 2022-12-28 PROCEDURE — 3074F SYST BP LT 130 MM HG: CPT | Mod: CPTII,S$GLB,, | Performed by: PHYSICIAN ASSISTANT

## 2022-12-28 PROCEDURE — 3079F DIAST BP 80-89 MM HG: CPT | Mod: CPTII,S$GLB,, | Performed by: PHYSICIAN ASSISTANT

## 2022-12-28 PROCEDURE — 1160F PR REVIEW ALL MEDS BY PRESCRIBER/CLIN PHARMACIST DOCUMENTED: ICD-10-PCS | Mod: CPTII,S$GLB,, | Performed by: PHYSICIAN ASSISTANT

## 2022-12-28 PROCEDURE — 3079F PR MOST RECENT DIASTOLIC BLOOD PRESSURE 80-89 MM HG: ICD-10-PCS | Mod: CPTII,S$GLB,, | Performed by: PHYSICIAN ASSISTANT

## 2022-12-28 PROCEDURE — 1160F RVW MEDS BY RX/DR IN RCRD: CPT | Mod: CPTII,S$GLB,, | Performed by: PHYSICIAN ASSISTANT

## 2022-12-28 PROCEDURE — 99213 PR OFFICE/OUTPT VISIT, EST, LEVL III, 20-29 MIN: ICD-10-PCS | Mod: S$GLB,,, | Performed by: PHYSICIAN ASSISTANT

## 2022-12-28 PROCEDURE — 3008F PR BODY MASS INDEX (BMI) DOCUMENTED: ICD-10-PCS | Mod: CPTII,S$GLB,, | Performed by: PHYSICIAN ASSISTANT

## 2022-12-28 PROCEDURE — 3288F PR FALLS RISK ASSESSMENT DOCUMENTED: ICD-10-PCS | Mod: CPTII,S$GLB,, | Performed by: PHYSICIAN ASSISTANT

## 2022-12-28 PROCEDURE — 3008F BODY MASS INDEX DOCD: CPT | Mod: CPTII,S$GLB,, | Performed by: PHYSICIAN ASSISTANT

## 2022-12-28 PROCEDURE — 81000 URINALYSIS NONAUTO W/SCOPE: CPT | Performed by: PHYSICIAN ASSISTANT

## 2022-12-28 PROCEDURE — 87086 URINE CULTURE/COLONY COUNT: CPT | Performed by: PHYSICIAN ASSISTANT

## 2022-12-28 PROCEDURE — 1101F PT FALLS ASSESS-DOCD LE1/YR: CPT | Mod: CPTII,S$GLB,, | Performed by: PHYSICIAN ASSISTANT

## 2022-12-28 PROCEDURE — 3074F PR MOST RECENT SYSTOLIC BLOOD PRESSURE < 130 MM HG: ICD-10-PCS | Mod: CPTII,S$GLB,, | Performed by: PHYSICIAN ASSISTANT

## 2022-12-28 PROCEDURE — 4010F PR ACE/ARB THEARPY RXD/TAKEN: ICD-10-PCS | Mod: CPTII,S$GLB,, | Performed by: PHYSICIAN ASSISTANT

## 2022-12-28 PROCEDURE — 99999 PR PBB SHADOW E&M-EST. PATIENT-LVL IV: CPT | Mod: PBBFAC,,, | Performed by: PHYSICIAN ASSISTANT

## 2022-12-28 PROCEDURE — 99999 PR PBB SHADOW E&M-EST. PATIENT-LVL IV: ICD-10-PCS | Mod: PBBFAC,,, | Performed by: PHYSICIAN ASSISTANT

## 2022-12-28 PROCEDURE — 4010F ACE/ARB THERAPY RXD/TAKEN: CPT | Mod: CPTII,S$GLB,, | Performed by: PHYSICIAN ASSISTANT

## 2022-12-28 PROCEDURE — 1126F PR PAIN SEVERITY QUANTIFIED, NO PAIN PRESENT: ICD-10-PCS | Mod: CPTII,S$GLB,, | Performed by: PHYSICIAN ASSISTANT

## 2022-12-28 NOTE — PROGRESS NOTES
Subjective:      Patient ID: Kike Smith is a 67 y.o. male.    Chief Complaint: Urinary Tract Infection    Urinary Frequency   This is a new problem. The current episode started yesterday. There has been no fever. Associated symptoms include frequency, urgency and constipation. Pertinent negatives include no behavior changes, chills, discharge, flank pain, hematuria, hesitancy, nausea, possible pregnancy, sweats, vomiting, weight loss, bubble bath use, rash or withholding. There is no history of catheterization, diabetes insipidus, diabetes mellitus, genitourinary reflux, hypertension, kidney stones, recurrent UTIs, a single kidney, STD, urinary stasis or a urological procedure.   UTI last month. Resolved with bactrim.     Patient Active Problem List   Diagnosis    Hypertension    Epididymal cyst    Hyperlipidemia    Renal cyst    Malignant neoplasm of prostate    IPMN (intraductal papillary mucinous neoplasm)    Long term (current) use of antithrombotics/antiplatelets    Allergies    Overweight    Abnormal EKG    Snoring    Edema    Erectile dysfunction following radical prostatectomy    Decreased strength, endurance, and mobility    Urine incontinence    Pelvic floor weakness, male         Current Outpatient Medications:     amLODIPine (NORVASC) 5 MG tablet, Take 1 tablet (5 mg total) by mouth once daily., Disp: 90 tablet, Rfl: 4    azelastine (ASTELIN) 137 mcg (0.1 %) nasal spray, 1 spray by Nasal route 2 (two) times daily. , Disp: , Rfl:     ciprofloxacin-dexamethasone 0.3-0.1% (CIPRODEX) 0.3-0.1 % DrpS, , Disp: , Rfl:     COVID-19 vac, tereza,Pfizer,,PF, (PFIZER COVID-19 TEREZA VACCN,PF,) 30 mcg/0.3 mL injection, Inject into the muscle., Disp: 0.3 mL, Rfl: 0    fluocinolone acetonide oiL 0.01 % Drop, , Disp: , Rfl:     fluticasone propionate (FLONASE) 50 mcg/actuation nasal spray, 2 sprays by Each Nostril route once daily., Disp: , Rfl:     hydroCHLOROthiazide (HYDRODIURIL) 12.5 MG Tab, Take 1 tablet (12.5 mg  total) by mouth once daily., Disp: 90 tablet, Rfl: 1    Lactobacillus acidophilus (PROBIOTIC) 10 billion cell Cap, Take 1 tablet by mouth once daily. , Disp: , Rfl:     pravastatin (PRAVACHOL) 40 MG tablet, TAKE 1 TABLET BY MOUTH EVERY DAY, Disp: 90 tablet, Rfl: 2    valsartan (DIOVAN) 160 MG tablet, Take 1 tablet (160 mg total) by mouth once daily., Disp: 90 tablet, Rfl: 1    Review of Systems   Constitutional:  Negative for activity change, appetite change, chills, diaphoresis, fatigue, fever, unexpected weight change and weight loss.   HENT: Negative.  Negative for congestion, hearing loss, postnasal drip, rhinorrhea, sore throat, trouble swallowing and voice change.    Eyes: Negative.  Negative for visual disturbance.   Respiratory: Negative.  Negative for cough, choking, chest tightness and shortness of breath.    Cardiovascular:  Negative for chest pain, palpitations and leg swelling.   Gastrointestinal:  Positive for constipation. Negative for abdominal distention, abdominal pain, blood in stool, diarrhea, nausea and vomiting.   Endocrine: Negative for cold intolerance, heat intolerance, polydipsia and polyuria.   Genitourinary:  Positive for decreased urine volume, frequency and urgency. Negative for difficulty urinating, dysuria, enuresis, flank pain, hematuria, hesitancy, penile discharge, penile pain, penile swelling, scrotal swelling and testicular pain.   Musculoskeletal:  Negative for arthralgias, back pain, gait problem, joint swelling and myalgias.   Skin:  Negative for color change, pallor, rash and wound.   Neurological:  Negative for dizziness, tremors, weakness, light-headedness, numbness and headaches.   Hematological:  Negative for adenopathy.   Psychiatric/Behavioral:  Negative for behavioral problems, confusion, self-injury, sleep disturbance and suicidal ideas. The patient is not nervous/anxious.    Objective:   /80 (BP Location: Left arm, Patient Position: Sitting, BP Method: Large  "(Manual))   Pulse 75   Temp 96.9 °F (36.1 °C) (Tympanic)   Ht 5' 10" (1.778 m)   Wt 97.4 kg (214 lb 11.7 oz)   SpO2 95%   BMI 30.81 kg/m²     Physical Exam  Vitals and nursing note reviewed.   Constitutional:       General: He is not in acute distress.     Appearance: Normal appearance. He is well-developed. He is not ill-appearing, toxic-appearing or diaphoretic.   HENT:      Head: Normocephalic and atraumatic.   Cardiovascular:      Rate and Rhythm: Normal rate and regular rhythm.      Heart sounds: Normal heart sounds. No murmur heard.    No friction rub. No gallop.   Pulmonary:      Effort: Pulmonary effort is normal. No respiratory distress.      Breath sounds: Normal breath sounds. No wheezing or rales.   Skin:     General: Skin is warm.      Findings: No rash.   Neurological:      Mental Status: He is alert and oriented to person, place, and time.   Psychiatric:         Mood and Affect: Mood normal.         Behavior: Behavior normal.         Thought Content: Thought content normal.         Judgment: Judgment normal.     Lab Results   Component Value Date    CREATININE 0.8 06/21/2022    BUN 24 (H) 06/21/2022     06/21/2022    K 4.5 06/21/2022     06/21/2022    CO2 30 (H) 06/21/2022       Assessment:     1. Urinary frequency      Plan:   Urinary frequency  -     Urinalysis; Future; Expected date: 12/28/2022  -     Urine culture; Future    -increase water intake.   -if negative, might be too early to test. If symptoms worse by Friday will call in antibiotic. Contact me via [phone].     Follow up if symptoms worsen or fail to improve.      "

## 2022-12-29 LAB — BACTERIA UR CULT: NO GROWTH

## 2023-01-24 ENCOUNTER — OFFICE VISIT (OUTPATIENT)
Dept: DERMATOLOGY | Facility: CLINIC | Age: 68
End: 2023-01-24
Payer: MEDICARE

## 2023-01-24 DIAGNOSIS — Z85.828 HISTORY OF SKIN CANCER: ICD-10-CM

## 2023-01-24 DIAGNOSIS — D22.9 MULTIPLE NEVI: ICD-10-CM

## 2023-01-24 DIAGNOSIS — L82.1 SEBORRHEIC KERATOSIS: ICD-10-CM

## 2023-01-24 DIAGNOSIS — L90.5 SCAR CONDITIONS/SKIN FIBROSIS: Primary | ICD-10-CM

## 2023-01-24 DIAGNOSIS — L57.0 ACTINIC KERATOSIS: ICD-10-CM

## 2023-01-24 DIAGNOSIS — Z12.83 SCREENING, MALIGNANT NEOPLASM, SKIN: ICD-10-CM

## 2023-01-24 PROCEDURE — 1126F AMNT PAIN NOTED NONE PRSNT: CPT | Mod: CPTII,S$GLB,, | Performed by: DERMATOLOGY

## 2023-01-24 PROCEDURE — 3288F FALL RISK ASSESSMENT DOCD: CPT | Mod: CPTII,S$GLB,, | Performed by: DERMATOLOGY

## 2023-01-24 PROCEDURE — 1159F PR MEDICATION LIST DOCUMENTED IN MEDICAL RECORD: ICD-10-PCS | Mod: CPTII,S$GLB,, | Performed by: DERMATOLOGY

## 2023-01-24 PROCEDURE — 99213 PR OFFICE/OUTPT VISIT, EST, LEVL III, 20-29 MIN: ICD-10-PCS | Mod: 25,S$GLB,, | Performed by: DERMATOLOGY

## 2023-01-24 PROCEDURE — 17003 DESTRUCTION, PREMALIGNANT LESIONS; SECOND THROUGH 14 LESIONS: ICD-10-PCS | Mod: S$GLB,,, | Performed by: DERMATOLOGY

## 2023-01-24 PROCEDURE — 1101F PT FALLS ASSESS-DOCD LE1/YR: CPT | Mod: CPTII,S$GLB,, | Performed by: DERMATOLOGY

## 2023-01-24 PROCEDURE — 1160F PR REVIEW ALL MEDS BY PRESCRIBER/CLIN PHARMACIST DOCUMENTED: ICD-10-PCS | Mod: CPTII,S$GLB,, | Performed by: DERMATOLOGY

## 2023-01-24 PROCEDURE — 99999 PR PBB SHADOW E&M-EST. PATIENT-LVL III: ICD-10-PCS | Mod: PBBFAC,,, | Performed by: DERMATOLOGY

## 2023-01-24 PROCEDURE — 17000 PR DESTRUCTION(LASER SURGERY,CRYOSURGERY,CHEMOSURGERY),PREMALIGNANT LESIONS,FIRST LESION: ICD-10-PCS | Mod: S$GLB,,, | Performed by: DERMATOLOGY

## 2023-01-24 PROCEDURE — 1160F RVW MEDS BY RX/DR IN RCRD: CPT | Mod: CPTII,S$GLB,, | Performed by: DERMATOLOGY

## 2023-01-24 PROCEDURE — 17003 DESTRUCT PREMALG LES 2-14: CPT | Mod: S$GLB,,, | Performed by: DERMATOLOGY

## 2023-01-24 PROCEDURE — 99213 OFFICE O/P EST LOW 20 MIN: CPT | Mod: 25,S$GLB,, | Performed by: DERMATOLOGY

## 2023-01-24 PROCEDURE — 1126F PR PAIN SEVERITY QUANTIFIED, NO PAIN PRESENT: ICD-10-PCS | Mod: CPTII,S$GLB,, | Performed by: DERMATOLOGY

## 2023-01-24 PROCEDURE — 3288F PR FALLS RISK ASSESSMENT DOCUMENTED: ICD-10-PCS | Mod: CPTII,S$GLB,, | Performed by: DERMATOLOGY

## 2023-01-24 PROCEDURE — 1159F MED LIST DOCD IN RCRD: CPT | Mod: CPTII,S$GLB,, | Performed by: DERMATOLOGY

## 2023-01-24 PROCEDURE — 1101F PR PT FALLS ASSESS DOC 0-1 FALLS W/OUT INJ PAST YR: ICD-10-PCS | Mod: CPTII,S$GLB,, | Performed by: DERMATOLOGY

## 2023-01-24 PROCEDURE — 17000 DESTRUCT PREMALG LESION: CPT | Mod: S$GLB,,, | Performed by: DERMATOLOGY

## 2023-01-24 PROCEDURE — 99999 PR PBB SHADOW E&M-EST. PATIENT-LVL III: CPT | Mod: PBBFAC,,, | Performed by: DERMATOLOGY

## 2023-01-24 NOTE — PROGRESS NOTES
Subjective:       Patient ID:  Kike Smith is a 67 y.o. male who presents for   Chief Complaint   Patient presents with    Skin Check     Full body      Hx of SCC of the right forearm (s/p Mohs by Dr. 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 Evans.  He c/o lesion of the right chin x several months.  + irritation.  No tx tried.     This is a high risk patient here to check for the development of new lesions. Denies bleeding, tender, growing, or concerning lesions.     Review of Systems   Constitutional:  Negative for fever and chills.   Gastrointestinal:  Negative for nausea and vomiting.   Skin:  Positive for activity-related sunscreen use. Negative for daily sunscreen use and recent sunburn.   Hematologic/Lymphatic: Does not bruise/bleed easily.      Objective:    Physical Exam   Constitutional: He appears well-developed and well-nourished. No distress.   Neurological: He is alert and oriented to person, place, and time. He is not disoriented.   Psychiatric: He has a normal mood and affect.   Skin:   Areas Examined (abnormalities noted in diagram):   Scalp / Hair Palpated and Inspected  Head / Face Inspection Performed  Neck Inspection Performed  Chest / Axilla Inspection Performed  Abdomen Inspection Performed  Back Inspection Performed  RUE Inspected  LUE Inspection Performed  RLE Inspected  LLE Inspection Performed  Nails and Digits Inspection Performed                 Diagram Legend     Erythematous scaling macule/papule c/w actinic keratosis       Vascular papule c/w angioma      Pigmented verrucoid papule/plaque c/w seborrheic keratosis      Yellow umbilicated papule c/w sebaceous hyperplasia      Irregularly shaped tan macule c/w lentigo     1-2 mm smooth white papules consistent with Milia      Movable subcutaneous cyst with punctum c/w epidermal inclusion cyst      Subcutaneous  movable cyst c/w pilar cyst      Firm pink to brown papule c/w dermatofibroma      Pedunculated fleshy papule(s) c/w skin tag(s)      Evenly pigmented macule c/w junctional nevus     Mildly variegated pigmented, slightly irregular-bordered macule c/w mildly atypical nevus      Flesh colored to evenly pigmented papule c/w intradermal nevus       Pink pearly papule/plaque c/w basal cell carcinoma      Erythematous hyperkeratotic cursted plaque c/w SCC      Surgical scar with no sign of skin cancer recurrence      Open and closed comedones      Inflammatory papules and pustules      Verrucoid papule consistent consistent with wart     Erythematous eczematous patches and plaques     Dystrophic onycholytic nail with subungual debris c/w onychomycosis     Umbilicated papule    Erythematous-base heme-crusted tan verrucoid plaque consistent with inflamed seborrheic keratosis     Erythematous Silvery Scaling Plaque c/w Psoriasis     See annotation      Assessment / Plan:        Scar conditions/skin fibrosis  Screening, malignant neoplasm, skin  History of skin cancer  Scar of the right forearm, hx of NMSC.  No evidence of recurrence on physical exam today.  Continue routine skin surveillance. Daily sunscreen advised.    Seborrheic keratosis  Reassurance given. Discussed diagnosis and that lesions are benign.  AAD handout given.     Multiple nevi  Reassurance given.  Discussed ABCDEF of melanoma and changes for patient to look for.  AAD Handout given. Discussed importance of daily use of sunscreen which is broad-spectrum and has a minimum SPF of 30.    Actinic keratosis  Cryosurgery Procedure Note    The patient is informed of the precancerous quality and need for treatment of these lesions. After risks, benefits and alternatives explained, including blistering, pain, hyper- and hypopigmentation, patient verbally consents to cryotherapy to precancerous lesions. Liquid nitrogen cryosurgery is applied to the 2 actinic  keratoses, as detailed in the physical exam, to produce a freeze injury. The patient is aware that blisters may form and is instructed on wound care with gentle cleansing and use of vaseline ointment to keep moist until healed. The patient is supplied a handout on cryosurgery and is instructed to call if lesions do not completely resolve.             Follow up in about 1 year (around 1/24/2024).

## 2023-01-27 DIAGNOSIS — D49.0 IPMN (INTRADUCTAL PAPILLARY MUCINOUS NEOPLASM): Primary | ICD-10-CM

## 2023-02-02 ENCOUNTER — LAB VISIT (OUTPATIENT)
Dept: LAB | Facility: HOSPITAL | Age: 68
End: 2023-02-02
Attending: UROLOGY
Payer: MEDICARE

## 2023-02-02 DIAGNOSIS — C61 MALIGNANT NEOPLASM OF PROSTATE: ICD-10-CM

## 2023-02-02 LAB — COMPLEXED PSA SERPL-MCNC: <0.01 NG/ML (ref 0–4)

## 2023-02-02 PROCEDURE — 84153 ASSAY OF PSA TOTAL: CPT | Performed by: UROLOGY

## 2023-02-02 PROCEDURE — 36415 COLL VENOUS BLD VENIPUNCTURE: CPT | Performed by: UROLOGY

## 2023-02-02 NOTE — PROGRESS NOTES
Clinic Note  2/2/2023      Subjective:         Chief Complaint:   HPI  Kike Smith is a 67 y.o. male diagnosed with [id number] prostate cancer. Consult from Dr Darling.  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  Potent and continent prior to surgery. RALP (robotic assisted laparoscopic prostatectomy) 9/20/2021- path showed 1+ LN. [id number], Julian 4+3(GG3).  Reviewed path at last visit. Discussed N1 disease. Discussed observation, ADT +/- EBRT. Patient interested in pursuing surveillance.  Has MICHAELA, using 4-5 minipads/day. Doing kegel exercises 4x10. Symptoms worsened by HCTZ, will discuss with PCP.  Has ED, sees Dr. Chen. Continuing cialis.     FH -negative  PSA - 5.7  Stage - T1C  Volume - 17 ccs TRUS  MRI - n/a  Biopsy - 7/19/2021- Lizette 4+3 left apex 1/2 40%; Lizette 3+4 left base 1/2 20%, right apex 2/2 40%, right middle 1/2 30%; Lizette 6 2/2 30%. Overall 7/12 cores positive.  Bone scan-negative  CT scan-negative  MOON Score - 5 intermediate risk  NCCN - Unfavorable intermediate  Germline testing - not indicated     3/18/2022- PSA 0.01  Has been working with PT. Continence improved, only leaks with cough.  Stopped Cialis due to GERD.     8/5/2022- PSA 0.01-6/21/2022. Has been working on hypertension control.    2/3/2023- PSA <0.01.      Lab Results   Component Value Date    PSA 0.01 06/21/2022    PSA 5.5 (H) 06/18/2021    PSA 4.1 (H) 07/21/2020    PSA 3.9 03/19/2019    PSA 3.8 03/02/2018    PSA 3.7 09/20/2017    PSA 3.7 02/20/2017    PSA 2.6 08/23/2016    PSA 3.3 12/29/2015    PSA 2.3 10/01/2014    PSADIAG <0.01 02/02/2023    PSADIAG 0.01 03/14/2022    PSADIAG 0.01 11/02/2021    PSADIAG 5.7 (H) 07/12/2021       Past Medical History:   Diagnosis Date    Allergy     BPH (benign prostatic hyperplasia)     History of colon polyps 01/25/2018    He has had colon polyps since he was in his 30's.  He has had multiple colonoscopies and has had adenomatous polyps.    History of fracture 09/14/2021     History of inguinal hernia repair 09/14/2021    Hyperlipidemia     Hypertension     Inguinal hernia     IPMN (intraductal papillary mucinous neoplasm)     annual eus or mri    Obesity     MERRITT on CPAP     via ent dr wick 2022    Prostate cancer     Squamous cell carcinoma skin of arm      r arm;s/p mohs     Family History   Problem Relation Age of Onset    Macular degeneration Maternal Uncle     Bladder Cancer Brother         smoker    Cataracts Mother     Fanconi anemia Mother     Leukemia Father 80    Coronary artery disease Father     Heart attack Father 72    Multiple myeloma Sister 59     Social History     Socioeconomic History    Marital status:    Tobacco Use    Smoking status: Never    Smokeless tobacco: Never   Substance and Sexual Activity    Alcohol use: No     Comment: quit 1983    Drug use: Never   Social History Narrative     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      Lost 35 y/o son to drug overdose summer 16     Past Surgical History:   Procedure Laterality Date    CLOSURE OF DEFECT OF MOHS PROCEDURE  07/10/2020    dr reynoso    COLONOSCOPY N/A 1/25/2018    Procedure: COLONOSCOPY;  Surgeon: Juan A Walter MD;  Location: [facility];  Service: Endoscopy;  Laterality: N/A;    ENDOSCOPIC ULTRASOUND OF UPPER GASTROINTESTINAL TRACT N/A 1/24/2022    Procedure: ULTRASOUND, ENDOSCOPIC, UPPER GI TRACT;  Surgeon: Daya Espinal MD;  Location: [facility];  Service: Endoscopy;  Laterality: N/A;    KNEE ARTHROSCOPY Left     ORIF TIBIA & FIBULA FRACTURES Left 08/10/2019    tib/fib fx    ROBOT-ASSISTED LAPAROSCOPIC PROSTATECTOMY USING DA ALEX XI N/A 9/20/2021    Procedure: XI ROBOTIC PROSTATECTOMY;  Surgeon: Fidencio Morton MD;  Location: [address];  Service: Urology;  Laterality: N/A;  3hr gen with regional    ROBOT-ASSISTED LAPAROSCOPIC REPAIR OF INGUINAL HERNIA USING DA ALEX XI Left 8/14/2020     "Procedure: XI ROBOTIC REPAIR, HERNIA, INGUINAL;  Surgeon: Mark Anthony Araujo MD;  Location: [facility] OR;  Service: General;  Laterality: Left;    ROBOT-ASSISTED LYMPHADENECTOMY N/A 9/20/2021    Procedure: ROBOTIC LYMPHADENECTOMY;  Surgeon: Fidencio Morton MD;  Location: [facility] OR [address] [place] [address];  Service: Urology;  Laterality: N/A;     Patient Active Problem List   Diagnosis    Hypertension    Epididymal cyst    Hyperlipidemia    Renal cyst    Malignant neoplasm of prostate    IPMN (intraductal papillary mucinous neoplasm)    Long term (current) use of antithrombotics/antiplatelets    Allergies    Overweight    Abnormal EKG    Snoring    Edema    Erectile dysfunction following radical prostatectomy    Decreased strength, endurance, and mobility    Urine incontinence    Pelvic floor weakness, male     Review of Systems   Constitutional:  Negative for appetite change, chills, fatigue, fever and unexpected weight change.   HENT:  Positive for sinus pressure. Negative for nosebleeds.    Respiratory:  Negative for shortness of breath and wheezing.    Cardiovascular:  Negative for chest pain, palpitations and leg swelling.   Gastrointestinal:  Negative for abdominal distention, abdominal pain, constipation, diarrhea, nausea and vomiting.   Genitourinary:  Negative for dysuria and hematuria.   Musculoskeletal:  Negative for arthralgias and back pain.   Skin:  Negative for pallor.   Neurological:  Negative for dizziness, seizures and syncope.   Hematological:  Negative for adenopathy.   Psychiatric/Behavioral:  Negative for dysphoric mood.        Objective:      There were no vitals taken for this visit.  Estimated body mass index is 30.81 kg/m² as calculated from the following:    Height as of 12/28/22: 5' 10" (1.778 m).    Weight as of 12/28/22: 97.4 kg (214 lb 11.7 oz).  Physical Exam  Constitutional:       General: He is not in acute distress.     Appearance: He is well-developed. He is not diaphoretic.   HENT:      " Head: Atraumatic.   Neck:      Trachea: No tracheal deviation.   Cardiovascular:      Rate and Rhythm: Normal rate.   Pulmonary:      Effort: Pulmonary effort is normal. No respiratory distress.      Breath sounds: No wheezing.   Abdominal:      General: Bowel sounds are normal. There is no distension.      Palpations: Abdomen is soft. There is no mass.      Tenderness: There is no abdominal tenderness. There is no guarding or rebound.   Skin:     General: Skin is warm and dry.   Neurological:      Mental Status: He is alert and oriented to person, place, and time.   Psychiatric:         Behavior: Behavior normal.         Thought Content: Thought content normal.         Judgment: Judgment normal.       Assessment and Plan:           Problem List Items Addressed This Visit       Malignant neoplasm of prostate - Primary       Follow up:   6 months with PSA    Fidencio Morton

## 2023-02-03 ENCOUNTER — OFFICE VISIT (OUTPATIENT)
Dept: UROLOGY | Facility: CLINIC | Age: 68
End: 2023-02-03
Payer: MEDICARE

## 2023-02-03 VITALS
BODY MASS INDEX: 31.31 KG/M2 | TEMPERATURE: 98 F | SYSTOLIC BLOOD PRESSURE: 127 MMHG | WEIGHT: 218.69 LBS | RESPIRATION RATE: 18 BRPM | OXYGEN SATURATION: 97 % | HEIGHT: 70 IN | DIASTOLIC BLOOD PRESSURE: 77 MMHG | HEART RATE: 73 BPM

## 2023-02-03 DIAGNOSIS — C61 MALIGNANT NEOPLASM OF PROSTATE: Primary | ICD-10-CM

## 2023-02-03 PROCEDURE — 99999 PR PBB SHADOW E&M-EST. PATIENT-LVL III: ICD-10-PCS | Mod: PBBFAC,,, | Performed by: UROLOGY

## 2023-02-03 PROCEDURE — 3078F PR MOST RECENT DIASTOLIC BLOOD PRESSURE < 80 MM HG: ICD-10-PCS | Mod: CPTII,S$GLB,, | Performed by: UROLOGY

## 2023-02-03 PROCEDURE — 99214 OFFICE O/P EST MOD 30 MIN: CPT | Mod: S$GLB,,, | Performed by: UROLOGY

## 2023-02-03 PROCEDURE — 3008F BODY MASS INDEX DOCD: CPT | Mod: CPTII,S$GLB,, | Performed by: UROLOGY

## 2023-02-03 PROCEDURE — 1126F PR PAIN SEVERITY QUANTIFIED, NO PAIN PRESENT: ICD-10-PCS | Mod: CPTII,S$GLB,, | Performed by: UROLOGY

## 2023-02-03 PROCEDURE — 3288F PR FALLS RISK ASSESSMENT DOCUMENTED: ICD-10-PCS | Mod: CPTII,S$GLB,, | Performed by: UROLOGY

## 2023-02-03 PROCEDURE — 3074F PR MOST RECENT SYSTOLIC BLOOD PRESSURE < 130 MM HG: ICD-10-PCS | Mod: CPTII,S$GLB,, | Performed by: UROLOGY

## 2023-02-03 PROCEDURE — 3288F FALL RISK ASSESSMENT DOCD: CPT | Mod: CPTII,S$GLB,, | Performed by: UROLOGY

## 2023-02-03 PROCEDURE — 3078F DIAST BP <80 MM HG: CPT | Mod: CPTII,S$GLB,, | Performed by: UROLOGY

## 2023-02-03 PROCEDURE — 99999 PR PBB SHADOW E&M-EST. PATIENT-LVL III: CPT | Mod: PBBFAC,,, | Performed by: UROLOGY

## 2023-02-03 PROCEDURE — 1101F PR PT FALLS ASSESS DOC 0-1 FALLS W/OUT INJ PAST YR: ICD-10-PCS | Mod: CPTII,S$GLB,, | Performed by: UROLOGY

## 2023-02-03 PROCEDURE — 1101F PT FALLS ASSESS-DOCD LE1/YR: CPT | Mod: CPTII,S$GLB,, | Performed by: UROLOGY

## 2023-02-03 PROCEDURE — 3008F PR BODY MASS INDEX (BMI) DOCUMENTED: ICD-10-PCS | Mod: CPTII,S$GLB,, | Performed by: UROLOGY

## 2023-02-03 PROCEDURE — 99214 PR OFFICE/OUTPT VISIT, EST, LEVL IV, 30-39 MIN: ICD-10-PCS | Mod: S$GLB,,, | Performed by: UROLOGY

## 2023-02-03 PROCEDURE — 3074F SYST BP LT 130 MM HG: CPT | Mod: CPTII,S$GLB,, | Performed by: UROLOGY

## 2023-02-03 PROCEDURE — 1159F PR MEDICATION LIST DOCUMENTED IN MEDICAL RECORD: ICD-10-PCS | Mod: CPTII,S$GLB,, | Performed by: UROLOGY

## 2023-02-03 PROCEDURE — 1126F AMNT PAIN NOTED NONE PRSNT: CPT | Mod: CPTII,S$GLB,, | Performed by: UROLOGY

## 2023-02-03 PROCEDURE — 1159F MED LIST DOCD IN RCRD: CPT | Mod: CPTII,S$GLB,, | Performed by: UROLOGY

## 2023-02-03 RX ORDER — AMOXICILLIN AND CLAVULANATE POTASSIUM 875; 125 MG/1; MG/1
TABLET, FILM COATED ORAL
Status: ON HOLD | COMMUNITY
Start: 2023-01-16 | End: 2023-07-12 | Stop reason: HOSPADM

## 2023-02-03 RX ORDER — BENZONATATE 200 MG/1
200 CAPSULE ORAL 3 TIMES DAILY
COMMUNITY
Start: 2023-01-16

## 2023-02-22 ENCOUNTER — PATIENT MESSAGE (OUTPATIENT)
Dept: GASTROENTEROLOGY | Facility: CLINIC | Age: 68
End: 2023-02-22
Payer: MEDICARE

## 2023-03-06 ENCOUNTER — HOSPITAL ENCOUNTER (OUTPATIENT)
Dept: RADIOLOGY | Facility: HOSPITAL | Age: 68
Discharge: HOME | End: 2023-03-06
Attending: NURSE PRACTITIONER
Payer: MEDICARE

## 2023-03-06 DIAGNOSIS — D49.0 IPMN (INTRADUCTAL PAPILLARY MUCINOUS NEOPLASM): ICD-10-CM

## 2023-03-06 PROCEDURE — 25500020 PHARM REV CODE 255: Performed by: NURSE PRACTITIONER

## 2023-03-06 PROCEDURE — 74160 CT ABDOMEN WITH CONTRAST: ICD-10-PCS | Mod: 26,,, | Performed by: RADIOLOGY

## 2023-03-06 PROCEDURE — 74160 CT ABDOMEN W/CONTRAST: CPT | Mod: TC

## 2023-03-06 PROCEDURE — 74160 CT ABDOMEN W/CONTRAST: CPT | Mod: 26,,, | Performed by: RADIOLOGY

## 2023-03-06 RX ADMIN — IOHEXOL 100 ML: 350 INJECTION, SOLUTION INTRAVENOUS at 01:03

## 2023-03-07 ENCOUNTER — PATIENT MESSAGE (OUTPATIENT)
Dept: GASTROENTEROLOGY | Facility: CLINIC | Age: 68
End: 2023-03-07
Payer: MEDICARE

## 2023-03-17 ENCOUNTER — PATIENT MESSAGE (OUTPATIENT)
Dept: UROLOGY | Facility: CLINIC | Age: 68
End: 2023-03-17
Payer: MEDICARE

## 2023-05-05 ENCOUNTER — PATIENT MESSAGE (OUTPATIENT)
Dept: INTERNAL MEDICINE | Facility: CLINIC | Age: 68
End: 2023-05-05
Payer: MEDICARE

## 2023-06-01 ENCOUNTER — OFFICE VISIT (OUTPATIENT)
Dept: INTERNAL MEDICINE | Facility: CLINIC | Age: 68
End: 2023-06-01
Payer: MEDICARE

## 2023-06-01 ENCOUNTER — HOSPITAL ENCOUNTER (OUTPATIENT)
Dept: CARDIOLOGY | Facility: HOSPITAL | Age: 68
Discharge: HOME | End: 2023-06-01
Attending: FAMILY MEDICINE
Payer: MEDICARE

## 2023-06-01 ENCOUNTER — HOSPITAL ENCOUNTER (OUTPATIENT)
Dept: PREADMISSION TESTING | Facility: HOSPITAL | Age: 68
Discharge: HOME | End: 2023-06-01
Attending: FAMILY MEDICINE
Payer: MEDICARE

## 2023-06-01 VITALS
BODY MASS INDEX: 30.87 KG/M2 | HEART RATE: 64 BPM | TEMPERATURE: 100 F | OXYGEN SATURATION: 98 % | WEIGHT: 215.63 LBS | HEIGHT: 70 IN | DIASTOLIC BLOOD PRESSURE: 80 MMHG | SYSTOLIC BLOOD PRESSURE: 110 MMHG

## 2023-06-01 DIAGNOSIS — R07.9 CHEST PAIN, UNSPECIFIED TYPE: ICD-10-CM

## 2023-06-01 DIAGNOSIS — Z12.11 SCREEN FOR COLON CANCER: ICD-10-CM

## 2023-06-01 DIAGNOSIS — Z86.39 HISTORY OF OBESITY: ICD-10-CM

## 2023-06-01 DIAGNOSIS — I10 PRIMARY HYPERTENSION: ICD-10-CM

## 2023-06-01 DIAGNOSIS — Z00.00 ROUTINE HEALTH MAINTENANCE: Primary | ICD-10-CM

## 2023-06-01 DIAGNOSIS — Z85.46 HISTORY OF PROSTATE CANCER: ICD-10-CM

## 2023-06-01 PROCEDURE — 1126F AMNT PAIN NOTED NONE PRSNT: CPT | Mod: CPTII,S$GLB,, | Performed by: FAMILY MEDICINE

## 2023-06-01 PROCEDURE — 3008F BODY MASS INDEX DOCD: CPT | Mod: CPTII,S$GLB,, | Performed by: FAMILY MEDICINE

## 2023-06-01 PROCEDURE — 1101F PT FALLS ASSESS-DOCD LE1/YR: CPT | Mod: CPTII,S$GLB,, | Performed by: FAMILY MEDICINE

## 2023-06-01 PROCEDURE — 4010F ACE/ARB THERAPY RXD/TAKEN: CPT | Mod: CPTII,S$GLB,, | Performed by: FAMILY MEDICINE

## 2023-06-01 PROCEDURE — G0009 PNEUMOCOCCAL CONJUGATE VACCINE 20-VALENT: ICD-10-PCS | Mod: S$GLB,,, | Performed by: FAMILY MEDICINE

## 2023-06-01 PROCEDURE — 3074F PR MOST RECENT SYSTOLIC BLOOD PRESSURE < 130 MM HG: ICD-10-PCS | Mod: CPTII,S$GLB,, | Performed by: FAMILY MEDICINE

## 2023-06-01 PROCEDURE — 99397 PR PREVENTIVE VISIT,EST,65 & OVER: ICD-10-PCS | Mod: S$GLB,,, | Performed by: FAMILY MEDICINE

## 2023-06-01 PROCEDURE — 93010 EKG 12-LEAD: ICD-10-PCS | Mod: ,,, | Performed by: INTERNAL MEDICINE

## 2023-06-01 PROCEDURE — 3074F SYST BP LT 130 MM HG: CPT | Mod: CPTII,S$GLB,, | Performed by: FAMILY MEDICINE

## 2023-06-01 PROCEDURE — G0009 ADMIN PNEUMOCOCCAL VACCINE: HCPCS | Mod: S$GLB,,, | Performed by: FAMILY MEDICINE

## 2023-06-01 PROCEDURE — 3079F PR MOST RECENT DIASTOLIC BLOOD PRESSURE 80-89 MM HG: ICD-10-PCS | Mod: CPTII,S$GLB,, | Performed by: FAMILY MEDICINE

## 2023-06-01 PROCEDURE — 99999 PR PBB SHADOW E&M-EST. PATIENT-LVL IV: CPT | Mod: PBBFAC,,, | Performed by: FAMILY MEDICINE

## 2023-06-01 PROCEDURE — 3079F DIAST BP 80-89 MM HG: CPT | Mod: CPTII,S$GLB,, | Performed by: FAMILY MEDICINE

## 2023-06-01 PROCEDURE — 1101F PR PT FALLS ASSESS DOC 0-1 FALLS W/OUT INJ PAST YR: ICD-10-PCS | Mod: CPTII,S$GLB,, | Performed by: FAMILY MEDICINE

## 2023-06-01 PROCEDURE — 3288F PR FALLS RISK ASSESSMENT DOCUMENTED: ICD-10-PCS | Mod: CPTII,S$GLB,, | Performed by: FAMILY MEDICINE

## 2023-06-01 PROCEDURE — 93010 ELECTROCARDIOGRAM REPORT: CPT | Mod: ,,, | Performed by: INTERNAL MEDICINE

## 2023-06-01 PROCEDURE — 93005 ELECTROCARDIOGRAM TRACING: CPT

## 2023-06-01 PROCEDURE — 90677 PCV20 VACCINE IM: CPT | Mod: S$GLB,,, | Performed by: FAMILY MEDICINE

## 2023-06-01 PROCEDURE — 3008F PR BODY MASS INDEX (BMI) DOCUMENTED: ICD-10-PCS | Mod: CPTII,S$GLB,, | Performed by: FAMILY MEDICINE

## 2023-06-01 PROCEDURE — 90677 PNEUMOCOCCAL CONJUGATE VACCINE 20-VALENT: ICD-10-PCS | Mod: S$GLB,,, | Performed by: FAMILY MEDICINE

## 2023-06-01 PROCEDURE — 99397 PER PM REEVAL EST PAT 65+ YR: CPT | Mod: S$GLB,,, | Performed by: FAMILY MEDICINE

## 2023-06-01 PROCEDURE — 1159F MED LIST DOCD IN RCRD: CPT | Mod: CPTII,S$GLB,, | Performed by: FAMILY MEDICINE

## 2023-06-01 PROCEDURE — 3288F FALL RISK ASSESSMENT DOCD: CPT | Mod: CPTII,S$GLB,, | Performed by: FAMILY MEDICINE

## 2023-06-01 PROCEDURE — 1126F PR PAIN SEVERITY QUANTIFIED, NO PAIN PRESENT: ICD-10-PCS | Mod: CPTII,S$GLB,, | Performed by: FAMILY MEDICINE

## 2023-06-01 PROCEDURE — 1159F PR MEDICATION LIST DOCUMENTED IN MEDICAL RECORD: ICD-10-PCS | Mod: CPTII,S$GLB,, | Performed by: FAMILY MEDICINE

## 2023-06-01 PROCEDURE — 4010F PR ACE/ARB THEARPY RXD/TAKEN: ICD-10-PCS | Mod: CPTII,S$GLB,, | Performed by: FAMILY MEDICINE

## 2023-06-01 PROCEDURE — 99999 PR PBB SHADOW E&M-EST. PATIENT-LVL IV: ICD-10-PCS | Mod: PBBFAC,,, | Performed by: FAMILY MEDICINE

## 2023-06-01 NOTE — PROGRESS NOTES
Subjective:       Patient ID: Kike Smith is a  male.    Chief Complaint: Annual Exam    HPIhere for phys exam no c/o;  htn controlled and nl bps;baudilio statin; baudilio statin;ll. Hx 50 min daily stationary bike;digital med bps fine    fam hx bladder ca/hematuria nl w./u(brother bladder ca /smoker)renal cyst : nl hmaturia w/u yrs ago.     Prost cahx utd urol      Chronic sinus issues hx ent dr wick astelin/flonase      Several months 5-10 mins 2/10 nonexert lef tant cp w/o sob. Not daily can go few weeks w/o; belching helps some; no heartburn      Pancreatic lesion serena ct spring 23 likely lipoma w plan serena spring 24      Past Medical History:   Diagnosis Date    Allergy     BPH (benign prostatic hyperplasia)     History of colon polyps 1/25/2018    He has had colon polyps since he was in his 30's.  He has had multiple colonoscopies and has had adenomatous polyps.    History of fracture 9/14/2021    History of inguinal hernia repair 9/14/2021    Hyperlipidemia     Hypertension     Inguinal hernia     IPMN (intraductal papillary mucinous neoplasm)     annual eus or mri    Obesity     Prostate cancer     Squamous cell carcinoma skin of arm      r arm;s/p mohs     Past Surgical History:   Procedure Laterality Date    CLOSURE OF DEFECT OF MOHS PROCEDURE  07/10/2020    dr reynoso    COLONOSCOPY N/A 1/25/2018    Procedure: COLONOSCOPY;  Surgeon: Juan A Walter MD;  Location: [facility];  Service: Endoscopy;  Laterality: N/A;    ENDOSCOPIC ULTRASOUND OF UPPER GASTROINTESTINAL TRACT N/A 1/24/2022    Procedure: ULTRASOUND, ENDOSCOPIC, UPPER GI TRACT;  Surgeon: Daya Espinal MD;  Location: [facility];  Service: Endoscopy;  Laterality: N/A;    KNEE ARTHROSCOPY Left     ORIF TIBIA & FIBULA FRACTURES Left 08/10/2019    tib/fib fx    ROBOT-ASSISTED LAPAROSCOPIC PROSTATECTOMY USING DA ALEX XI N/A 9/20/2021    Procedure: XI ROBOTIC PROSTATECTOMY;  Surgeon: Fidencio Morton MD;  Location: [address];  Service:  Urology;  Laterality: N/A;  3hr gen with regional    ROBOT-ASSISTED LAPAROSCOPIC REPAIR OF INGUINAL HERNIA USING DA ALEX XI Left 8/14/2020    Procedure: XI ROBOTIC REPAIR, HERNIA, INGUINAL;  Surgeon: Mark Anthony Araujo MD;  [facility]: [facility] OR;  Service: General;  Laterality: Left;    ROBOT-ASSISTED LYMPHADENECTOMY N/A 9/20/2021    Procedure: ROBOTIC LYMPHADENECTOMY;  Surgeon: Fidencio Morton MD;  [facility];  Service: Urology;  Laterality: N/A;     Family History   Problem Relation Age of Onset    Macular degeneration Maternal Uncle     Bladder Cancer Brother         smoker    Cataracts Mother     Fanconi anemia Mother     Leukemia Father 80    Coronary artery disease Father     Heart attack Father 72    Multiple myeloma Sister 59     Social History     Socioeconomic History    Marital status:    Tobacco Use    Smoking status: Never Smoker    Smokeless tobacco: Never Used   Substance and Sexual Activity    Alcohol use: No     Comment: quit 1983    Drug use: Never   Social History Narrative     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 16     Social Determinants of Health     Financial Resource Strain: Low Risk     Difficulty of Paying Living Expenses: Not hard at all   Food Insecurity: No Food Insecurity    Worried About Running Out of Food in the Last Year: Never true    Ran Out of Food in the Last Year: Never true   Transportation Needs: No Transportation Needs    Lack of Transportation (Medical): No    Lack of Transportation (Non-Medical): No   Physical Activity: Sufficiently Active    Days of Exercise per Week: 5 days    Minutes of Exercise per Session: 50 min   Stress: No Stress Concern Present    Feeling of Stress : Only a little   Social Connections: Unknown    Frequency of Communication with Friends and Family: More than three times a week    Frequency of Social Gatherings with Friends and  Family: More than three times a week    Active Member of Clubs or Organizations: No    Attends Club or Organization Meetings: Never    Marital Status:    Housing Stability: Low Risk     Unable to Pay for Housing in the Last Year: No    Number of Places Lived in the Last Year: 1    Unstable Housing in the Last Year: No         Review of Systems  Cardiovascular: no chest pain  Chest: no shortness of breath  Abd: no abd pain  Remainder review of systems negative    Objective:      Physical Exam   Constitutional: He is oriented to person, place, and time. He appears well-developed and well-nourished.   HENT:   Head: Normocephalic and atraumatic.   Right Ear: External ear normal.   Left Ear: External ear normal.   Nose: Nose normal.   Mouth/Throat: Oropharynx is clear and moist.   Nl tms   Eyes: Conjunctivae and EOM are normal. Pupils are equal, round, and reactive to light. No scleral icterus.   Neck: Normal range of motion. Neck supple. Carotid bruit is not present.   Cardiovascular: Normal rate, regular rhythm and normal heart sounds.  Exam reveals no gallop and no friction rub.    No murmur heard.  Pulmonary/Chest: Effort normal and breath sounds normal. He has no wheezes.   Abdominal: Soft. Bowel sounds are normal. He exhibits no distension and no mass. There is no hepatosplenomegaly. There is no tenderness. There is no rebound and no guarding.   Musculoskeletal: Normal range of motion. He exhibits no tenderness.   Lymphadenopathy:     He has no cervical adenopathy.   Neurological: He is alert and oriented to person, place, and time. No cranial nerve deficit. Coordination normal.   Skin: Skin is warm and dry. No rash noted. No erythema.   Psychiatric: He has a normal mood and affect. His behavior is normal. Judgment and thought content normal.   Nursing note and vitals reviewed.      Assessment:       1. Routine health maintenance    htn  Hx prost ca  Chest pain  Likely benign panc lesion  Plan:     Gi f/u  one year  urol when due  Lab 12 months and follow up after    1. Routine health maintenance    2. History of obesity  -     Hemoglobin A1C; Future; Expected date: 06/01/2024    3. Primary hypertension  -     Comprehensive Metabolic Panel; Future; Expected date: 05/31/2024  -     Lipid Panel; Future; Expected date: 05/31/2024    4. Screen for colon cancer  -     Ambulatory referral/consult to Endo Procedure ; Future; Expected date: 06/02/2023    5. History of prostate cancer    6. Chest pain, unspecified type  -     EKG 12-lead; Future; Expected date: 06/01/2023  -     Stress Echo Which stress agent will be used? Treadmill Exercise; Color Flow Doppler? No; Future    Other orders  -     (In Office Administered) Pneumococcal Conjugate Vaccine (20 Valent) (IM)     Asa 81mg until stress tst and if ok can stop. If sympt inc or change then cardiol rec        une lab today if he wants  Lab 12 months and follow up after  Cancel late June visit

## 2023-06-05 RX ORDER — PRAVASTATIN SODIUM 40 MG/1
TABLET ORAL
Qty: 90 TABLET | Refills: 3 | Status: SHIPPED | OUTPATIENT
Start: 2023-06-05

## 2023-06-06 ENCOUNTER — HOSPITAL ENCOUNTER (OUTPATIENT)
Dept: CARDIOLOGY | Facility: HOSPITAL | Age: 68
Discharge: HOME | End: 2023-06-06
Attending: FAMILY MEDICINE
Payer: MEDICARE

## 2023-06-06 VITALS
HEIGHT: 70 IN | BODY MASS INDEX: 30.78 KG/M2 | WEIGHT: 215 LBS | SYSTOLIC BLOOD PRESSURE: 110 MMHG | DIASTOLIC BLOOD PRESSURE: 80 MMHG

## 2023-06-06 DIAGNOSIS — R07.9 CHEST PAIN, UNSPECIFIED TYPE: ICD-10-CM

## 2023-06-06 LAB
AORTIC ROOT ANNULUS: 3.18 CM
ASCENDING AORTA: 4.08 CM
AV INDEX (PROSTH): 0.92
AV MEAN GRADIENT: 4 MMHG
AV PEAK GRADIENT: 7 MMHG
AV VALVE AREA: 2.9 CM2
AV VELOCITY RATIO: 0.98
BSA FOR ECHO PROCEDURE: 2.19 M2
CV ECHO LV RWT: 0.93 CM
CV STRESS BASE HR: 78 BPM
DIASTOLIC BLOOD PRESSURE: 88 MMHG
DOP CALC AO PEAK VEL: 1.28 M/S
DOP CALC AO VTI: 27.2 CM
DOP CALC LVOT AREA: 3.2 CM2
DOP CALC LVOT DIAMETER: 2.01 CM
DOP CALC LVOT PEAK VEL: 1.26 M/S
DOP CALC LVOT STROKE VOLUME: 78.97 CM3
DOP CALC RVOT PEAK VEL: 0.68 M/S
DOP CALC RVOT VTI: 14 CM
DOP CALCLVOT PEAK VEL VTI: 24.9 CM
E WAVE DECELERATION TIME: 223.62 MSEC
E/A RATIO: 1.07
E/E' RATIO: 6.21 M/S
ECHO LV POSTERIOR WALL: 1.55 CM (ref 0.6–1.1)
EJECTION FRACTION: 65 %
FRACTIONAL SHORTENING: 30 % (ref 28–44)
INTERVENTRICULAR SEPTUM: 1.41 CM (ref 0.6–1.1)
IVC DIAMETER: 1.22 CM
IVRT: 79.92 MSEC
LA MAJOR: 4.68 CM
LA MINOR: 4.71 CM
LA WIDTH: 3.5 CM
LEFT ATRIUM SIZE: 3.23 CM
LEFT ATRIUM VOLUME INDEX MOD: 15.8 ML/M2
LEFT ATRIUM VOLUME INDEX: 21 ML/M2
LEFT ATRIUM VOLUME MOD: 33.99 CM3
LEFT ATRIUM VOLUME: 45.11 CM3
LEFT INTERNAL DIMENSION IN SYSTOLE: 2.34 CM (ref 2.1–4)
LEFT VENTRICLE DIASTOLIC VOLUME INDEX: 21.27 ML/M2
LEFT VENTRICLE DIASTOLIC VOLUME: 45.72 ML
LEFT VENTRICLE MASS INDEX: 83 G/M2
LEFT VENTRICLE SYSTOLIC VOLUME INDEX: 8.8 ML/M2
LEFT VENTRICLE SYSTOLIC VOLUME: 18.85 ML
LEFT VENTRICULAR INTERNAL DIMENSION IN DIASTOLE: 3.35 CM (ref 3.5–6)
LEFT VENTRICULAR MASS: 178.35 G
LV LATERAL E/E' RATIO: 5.36 M/S
LV SEPTAL E/E' RATIO: 7.38 M/S
LVOT MG: 3.13 MMHG
LVOT MV: 0.83 CM/S
MV PEAK A VEL: 0.55 M/S
MV PEAK E VEL: 0.59 M/S
MV STENOSIS PRESSURE HALF TIME: 64.85 MS
MV VALVE AREA P 1/2 METHOD: 3.39 CM2
OHS CV CPX 1 MINUTE RECOVERY HEART RATE: 107 BPM
OHS CV CPX 85 PERCENT MAX PREDICTED HEART RATE MALE: 131
OHS CV CPX ESTIMATED METS: 8
OHS CV CPX MAX PREDICTED HEART RATE: 154
OHS CV CPX PATIENT IS FEMALE: 0
OHS CV CPX PATIENT IS MALE: 1
OHS CV CPX PEAK DIASTOLIC BLOOD PRESSURE: 73 MMHG
OHS CV CPX PEAK HEAR RATE: 146 BPM
OHS CV CPX PEAK RATE PRESSURE PRODUCT: ABNORMAL
OHS CV CPX PEAK SYSTOLIC BLOOD PRESSURE: 203 MMHG
OHS CV CPX PERCENT MAX PREDICTED HEART RATE ACHIEVED: 95
OHS CV CPX RATE PRESSURE PRODUCT PRESENTING: 9282
PISA TR MAX VEL: 2.13 M/S
PULM VEIN S/D RATIO: 1.91
PV MEAN GRADIENT: 0.95 MMHG
PV MV: 0.68 M/S
PV PEAK D VEL: 0.33 M/S
PV PEAK S VEL: 0.63 M/S
PV PEAK VELOCITY: 1.09 CM/S
RA MAJOR: 5.18 CM
RA PRESSURE: 3 MMHG
RA WIDTH: 3.56 CM
RIGHT VENTRICULAR END-DIASTOLIC DIMENSION: 2.71 CM
RV TISSUE DOPPLER FREE WALL SYSTOLIC VELOCITY 1 (APICAL 4 CHAMBER VIEW): 0.02 CM/S
SINUS: 4.16 CM
STJ: 4.05 CM
STRESS ECHO POST EXERCISE DUR MIN: 8 MINUTES
STRESS ECHO POST EXERCISE DUR SEC: 13 SECONDS
STRESS ST DEPRESSION: 0.5 MM
SYSTOLIC BLOOD PRESSURE: 119 MMHG
TDI LATERAL: 0.11 M/S
TDI SEPTAL: 0.08 M/S
TDI: 0.1 M/S
TR MAX PG: 18 MMHG
TRICUSPID ANNULAR PLANE SYSTOLIC EXCURSION: 2.55 CM
TV REST PULMONARY ARTERY PRESSURE: 21 MMHG

## 2023-06-06 PROCEDURE — 93351 STRESS TTE COMPLETE: CPT | Mod: 26,,, | Performed by: INTERNAL MEDICINE

## 2023-06-06 PROCEDURE — 93351 STRESS ECHO (CUPID ONLY): ICD-10-PCS | Mod: 26,,, | Performed by: INTERNAL MEDICINE

## 2023-06-06 PROCEDURE — 93351 STRESS TTE COMPLETE: CPT

## 2023-06-07 ENCOUNTER — TELEPHONE (OUTPATIENT)
Dept: INTERNAL MEDICINE | Facility: CLINIC | Age: 68
End: 2023-06-07

## 2023-06-07 ENCOUNTER — PATIENT MESSAGE (OUTPATIENT)
Dept: INTERNAL MEDICINE | Facility: CLINIC | Age: 68
End: 2023-06-07
Payer: MEDICARE

## 2023-06-07 NOTE — TELEPHONE ENCOUNTER
Spoke with the patient stated that he was checking on the results from his EKG and stress test. The patient was informed that a message will be sent to Dr Reyna for advisement. The patient stated understanding.

## 2023-06-12 ENCOUNTER — HOSPITAL ENCOUNTER (OUTPATIENT)
Dept: PREADMISSION TESTING | Facility: HOSPITAL | Age: 68
Discharge: HOME | End: 2023-06-12
Attending: COLON & RECTAL SURGERY
Payer: MEDICARE

## 2023-06-12 DIAGNOSIS — Z12.11 SCREENING FOR COLON CANCER: Primary | ICD-10-CM

## 2023-06-12 RX ORDER — SODIUM, POTASSIUM,MAG SULFATES 17.5-3.13G
1 SOLUTION, RECONSTITUTED, ORAL ORAL DAILY
Qty: 1 KIT | Refills: 0 | Status: SHIPPED | OUTPATIENT
Start: 2023-06-12 | End: 2023-06-14

## 2023-07-05 ENCOUNTER — PATIENT MESSAGE (OUTPATIENT)
Dept: PREADMISSION TESTING | Facility: HOSPITAL | Age: 68
End: 2023-07-05
Payer: MEDICARE

## 2023-07-05 ENCOUNTER — TELEPHONE (OUTPATIENT)
Dept: PREADMISSION TESTING | Facility: HOSPITAL | Age: 68
End: 2023-07-05
Payer: MEDICARE

## 2023-07-05 RX ORDER — SODIUM PICOSULFATE, MAGNESIUM OXIDE, AND ANHYDROUS CITRIC ACID 10; 3.5; 12 MG/160ML; G/160ML; G/160ML
1 LIQUID ORAL SEE ADMIN INSTRUCTIONS
Qty: 1 EACH | Refills: 0 | Status: SHIPPED | OUTPATIENT
Start: 2023-07-05

## 2023-07-05 NOTE — TELEPHONE ENCOUNTER
----- Message from Maris Storey MA sent at 7/3/2023  2:42 PM CDT -----    ----- Message -----  From: Trinity Disla  Sent: 7/3/2023   2:34 PM CDT  To: Adenike Navarro Staff    Who Called: [facility]     What is the request in detail: Requesting call back to discuss is pt was seen in clinic for rx below. Please advise    Can the clinic reply by [facility]? No    Best Call Back Number: [phone]    Additional Information:sodium,potassium,mag sulfates (SUPREP BOWEL PREP KIT) 17.5-3.13-1.6 gram SolR 1 kit 0 6/12/2023 6/14/2023 --  Sig - Route: Take 177 mLs by mouth once daily. for 2 days - Oral  Sent to pharmacy as: sodium,potassium,mag sulfates (SUPREP BOWEL PREP KIT) 17.5-3.13-1.6 gram SolR  Class: Normal

## 2023-07-05 NOTE — TELEPHONE ENCOUNTER
I returned the call to [facility] and spoke with the  Danielle that answered the phone. Due to verification problems, they had a different address on file and could not get into pt chart to see what the call was in regards to.

## 2023-07-05 NOTE — TELEPHONE ENCOUNTER
----- Message from Ce Wood sent at 7/3/2023  2:11 PM CDT -----  Contact: Kike Stokes is needing the Clen piq prep called in as his insurance won't cover the Suprep. Please call him at [phone].    Please send it:   [id number] [place]AN[facility] - [facility] [address] AT [place] [place] [address]  Phone: [phone] Fax: [phone]

## 2023-07-06 ENCOUNTER — ANESTHESIA EVENT (OUTPATIENT)
Dept: ENDOSCOPY | Facility: HOSPITAL | Age: 68
End: 2023-07-06
Payer: MEDICARE

## 2023-07-06 NOTE — ANESTHESIA PREPROCEDURE EVALUATION
07/06/2023  Kike Smith is a 67 y.o., male.  Patient Active Problem List   Diagnosis    Hypertension    Epididymal cyst    Hyperlipidemia    Renal cyst    Malignant neoplasm of prostate    IPMN (intraductal papillary mucinous neoplasm)    Long term (current) use of antithrombotics/antiplatelets    Allergies    Overweight    Abnormal EKG    Snoring    Edema    Erectile dysfunction following radical prostatectomy    Decreased strength, endurance, and mobility    Urine incontinence    Pelvic floor weakness, male     Past Surgical History:   Procedure Laterality Date    CLOSURE OF DEFECT OF MOHS PROCEDURE  07/10/2020    dr reynoso    COLONOSCOPY N/A 1/25/2018    Procedure: COLONOSCOPY;  Surgeon: Juan A Walter MD;  Location: [facility];  Service: Endoscopy;  Laterality: N/A;    ENDOSCOPIC ULTRASOUND OF UPPER GASTROINTESTINAL TRACT N/A 1/24/2022    Procedure: ULTRASOUND, ENDOSCOPIC, UPPER GI TRACT;  Surgeon: Daya Espinal MD;  Location: [facility];  Service: Endoscopy;  Laterality: N/A;    KNEE ARTHROSCOPY Left     ORIF TIBIA & FIBULA FRACTURES Left 08/10/2019    tib/fib fx    ROBOT-ASSISTED LAPAROSCOPIC PROSTATECTOMY USING DA ALEX XI N/A 9/20/2021    Procedure: XI ROBOTIC PROSTATECTOMY;  Surgeon: Fidencio Morton MD;  [facility];  Service: Urology;  Laterality: N/A;  3hr gen with regional    ROBOT-ASSISTED LAPAROSCOPIC REPAIR OF INGUINAL HERNIA USING DA ALEX XI Left 8/14/2020    Procedure: XI ROBOTIC REPAIR, HERNIA, INGUINAL;  Surgeon: Mark Anthony Araujo MD;  Location: [facility];  Service: General;  Laterality: Left;    ROBOT-ASSISTED LYMPHADENECTOMY N/A 9/20/2021    Procedure: ROBOTIC LYMPHADENECTOMY;  Surgeon: Fidencio Morton MD;  [facility]: [facility] OR [facility];  Service: Urology;  Laterality: N/A;           Pre-op Assessment    I have reviewed the Patient  Summary Reports.     I have reviewed the Nursing Notes. I have reviewed the NPO Status.   I have reviewed the Medications.     Review of Systems  Anesthesia Hx:  No problems with previous Anesthesia  Denies Family Hx of Anesthesia complications.   Denies Personal Hx of Anesthesia complications.   Social:  Non-Smoker, No Alcohol Use    Hematology/Oncology:  Hematology Normal       -- Cancer in past history:  Oncology Comments: Prostate CA      EENT/Dental:EENT/Dental Normal   Cardiovascular:   Hypertension hyperlipidemia ECG has been reviewed. 6/2023 Stress Echo wnl, EKG SB    Pulmonary:   Sleep Apnea    Hepatic/GI:  Hepatic/GI Normal Bowel Prep.    Musculoskeletal:  Musculoskeletal Normal    Endocrine:  Obesity / BMI > 30  Dermatological:  Skin Normal    Psych:  Psychiatric Normal           Physical Exam  General: Alert and Oriented    Airway:  Mallampati: II   Mouth Opening: Normal  TM Distance: Normal  Tongue: Normal  Neck ROM: Normal ROM    Dental:  Intact    Chest/Lungs:  Clear to auscultation, Normal Respiratory Rate    Heart:  Rate: Normal  Rhythm: Regular Rhythm        Anesthesia Plan  Type of Anesthesia, risks & benefits discussed:    Anesthesia Type: Gen Natural Airway  Intra-op Monitoring Plan: Standard ASA Monitors  Post Op Pain Control Plan: multimodal analgesia  Induction:  IV  Informed Consent: Informed consent signed with the Patient and all parties understand the risks and agree with anesthesia plan.  All questions answered. Patient consented to blood products? No  ASA Score: 3  Day of Surgery Review of History & Physical: H&P Update referred to the surgeon/provider.    Ready For Surgery From Anesthesia Perspective.     .

## 2023-07-12 ENCOUNTER — ANESTHESIA (OUTPATIENT)
Dept: ENDOSCOPY | Facility: HOSPITAL | Age: 68
End: 2023-07-12
Payer: MEDICARE

## 2023-07-12 ENCOUNTER — HOSPITAL ENCOUNTER (OUTPATIENT)
Facility: HOSPITAL | Age: 68
Discharge: HOME | End: 2023-07-12
Attending: INTERNAL MEDICINE | Admitting: INTERNAL MEDICINE
Payer: MEDICARE

## 2023-07-12 VITALS
BODY MASS INDEX: 30.41 KG/M2 | HEIGHT: 70 IN | HEART RATE: 55 BPM | RESPIRATION RATE: 27 BRPM | WEIGHT: 212.44 LBS | DIASTOLIC BLOOD PRESSURE: 75 MMHG | SYSTOLIC BLOOD PRESSURE: 132 MMHG | OXYGEN SATURATION: 99 % | TEMPERATURE: 97 F

## 2023-07-12 DIAGNOSIS — Z12.11 COLON CANCER SCREENING: Primary | ICD-10-CM

## 2023-07-12 PROCEDURE — 88305 TISSUE EXAM BY PATHOLOGIST: CPT | Performed by: PATHOLOGY

## 2023-07-12 PROCEDURE — D9220A PRA ANESTHESIA: Mod: PT,CRNA,, | Performed by: NURSE ANESTHETIST, CERTIFIED REGISTERED

## 2023-07-12 PROCEDURE — D9220A PRA ANESTHESIA: ICD-10-PCS | Mod: PT,CRNA,, | Performed by: NURSE ANESTHETIST, CERTIFIED REGISTERED

## 2023-07-12 PROCEDURE — 63600175 PHARM REV CODE 636 W HCPCS: Performed by: NURSE ANESTHETIST, CERTIFIED REGISTERED

## 2023-07-12 PROCEDURE — 45380 COLONOSCOPY AND BIOPSY: CPT | Mod: PT | Performed by: INTERNAL MEDICINE

## 2023-07-12 PROCEDURE — 25000003 PHARM REV CODE 250: Performed by: NURSE ANESTHETIST, CERTIFIED REGISTERED

## 2023-07-12 PROCEDURE — 88305 TISSUE EXAM BY PATHOLOGIST: ICD-10-PCS | Mod: 26,,, | Performed by: PATHOLOGY

## 2023-07-12 PROCEDURE — 63600175 PHARM REV CODE 636 W HCPCS: Performed by: INTERNAL MEDICINE

## 2023-07-12 PROCEDURE — 45380 PR COLONOSCOPY,BIOPSY: ICD-10-PCS | Mod: PT,,, | Performed by: INTERNAL MEDICINE

## 2023-07-12 PROCEDURE — 37000009 HC ANESTHESIA EA ADD 15 MINS: Performed by: INTERNAL MEDICINE

## 2023-07-12 PROCEDURE — D9220A PRA ANESTHESIA: Mod: PT,ANES,, | Performed by: ANESTHESIOLOGY

## 2023-07-12 PROCEDURE — 25000003 PHARM REV CODE 250: Performed by: INTERNAL MEDICINE

## 2023-07-12 PROCEDURE — D9220A PRA ANESTHESIA: ICD-10-PCS | Mod: PT,ANES,, | Performed by: ANESTHESIOLOGY

## 2023-07-12 PROCEDURE — 88305 TISSUE EXAM BY PATHOLOGIST: CPT | Mod: 26,,, | Performed by: PATHOLOGY

## 2023-07-12 PROCEDURE — 27201012 HC FORCEPS, HOT/COLD, DISP: Performed by: INTERNAL MEDICINE

## 2023-07-12 PROCEDURE — 37000008 HC ANESTHESIA 1ST 15 MINUTES: Performed by: INTERNAL MEDICINE

## 2023-07-12 PROCEDURE — 45380 COLONOSCOPY AND BIOPSY: CPT | Mod: PT,,, | Performed by: INTERNAL MEDICINE

## 2023-07-12 RX ORDER — LIDOCAINE HYDROCHLORIDE 20 MG/ML
INJECTION, SOLUTION EPIDURAL; INFILTRATION; INTRACAUDAL; PERINEURAL
Status: DISCONTINUED | OUTPATIENT
Start: 2023-07-12 | End: 2023-07-12

## 2023-07-12 RX ORDER — SODIUM CHLORIDE, SODIUM LACTATE, POTASSIUM CHLORIDE, CALCIUM CHLORIDE 600; 310; 30; 20 MG/100ML; MG/100ML; MG/100ML; MG/100ML
INJECTION, SOLUTION INTRAVENOUS CONTINUOUS
Status: ACTIVE | OUTPATIENT
Start: 2023-07-12

## 2023-07-12 RX ORDER — DEXTROMETHORPHAN/PSEUDOEPHED 2.5-7.5/.8
DROPS ORAL
Status: DISCONTINUED | OUTPATIENT
Start: 2023-07-12 | End: 2023-07-12 | Stop reason: HOSPADM

## 2023-07-12 RX ORDER — PROPOFOL 10 MG/ML
VIAL (ML) INTRAVENOUS
Status: DISCONTINUED | OUTPATIENT
Start: 2023-07-12 | End: 2023-07-12

## 2023-07-12 RX ADMIN — SODIUM CHLORIDE, SODIUM LACTATE, POTASSIUM CHLORIDE, AND CALCIUM CHLORIDE: 600; 310; 30; 20 INJECTION, SOLUTION INTRAVENOUS at 08:07

## 2023-07-12 RX ADMIN — LIDOCAINE HYDROCHLORIDE 60 MG: 20 INJECTION, SOLUTION EPIDURAL; INFILTRATION; INTRACAUDAL; PERINEURAL at 09:07

## 2023-07-12 RX ADMIN — SODIUM CHLORIDE, SODIUM LACTATE, POTASSIUM CHLORIDE, AND CALCIUM CHLORIDE: 600; 310; 30; 20 INJECTION, SOLUTION INTRAVENOUS at 09:07

## 2023-07-12 RX ADMIN — PROPOFOL 50 MG: 10 INJECTION, EMULSION INTRAVENOUS at 09:07

## 2023-07-12 RX ADMIN — PROPOFOL 120 MG: 10 INJECTION, EMULSION INTRAVENOUS at 09:07

## 2023-07-12 NOTE — H&P
PRE PROCEDURE H&P    Patient Name: Kike Smith  MRN: [id number]  : 1955  Date of Procedure:  2023  Referring Physician: Anmol Reyna MD  Primary Physician: Anmol Reyna MD  Procedure Physician: Danielle Aguila MD       Planned Procedure: Colonoscopy  Diagnosis: previous adenomatous polyp  Chief Complaint: Same as above    HPI: Patient is an 67 y.o. male is here for the above.     Last colonoscopy:   Family history: neg   Anticoagulation: none     Past Medical History:   Past Medical History:   Diagnosis Date    Allergy     BPH (benign prostatic hyperplasia)     History of colon polyps 2018    He has had colon polyps since he was in his 30's.  He has had multiple colonoscopies and has had adenomatous polyps.    History of fracture 2021    History of inguinal hernia repair 2021    Hyperlipidemia     Hypertension     Inguinal hernia     IPMN (intraductal papillary mucinous neoplasm)     annual eus or mri    Obesity     MERRITT on CPAP     via ent dr wick     Prostate cancer     Squamous cell carcinoma skin of arm      r arm;s/p mohs        Past Surgical History:  Past Surgical History:   Procedure Laterality Date    CLOSURE OF DEFECT OF MOHS PROCEDURE  07/10/2020    dr reynoso    COLONOSCOPY N/A 2018    Procedure: COLONOSCOPY;  Surgeon: Juan A Walter MD;  Location: [facility];  Service: Endoscopy;  Laterality: N/A;    ENDOSCOPIC ULTRASOUND OF UPPER GASTROINTESTINAL TRACT N/A 2022    Procedure: ULTRASOUND, ENDOSCOPIC, UPPER GI TRACT;  Surgeon: Daya Espinal MD;  Location: [facility];  Service: Endoscopy;  Laterality: N/A;    KNEE ARTHROSCOPY Left     ORIF TIBIA & FIBULA FRACTURES Left 08/10/2019    tib/fib fx    ROBOT-ASSISTED LAPAROSCOPIC PROSTATECTOMY USING DA ALEX XI N/A 2021    Procedure: XI ROBOTIC PROSTATECTOMY;  Surgeon: Fidencio Morton MD;  Location: [facility] OR [address] [place] [address];  Service: Urology;  Laterality: N/A;  3hr gen with regional     ROBOT-ASSISTED LAPAROSCOPIC REPAIR OF INGUINAL HERNIA USING DA ALEX XI Left 8/14/2020    Procedure: XI ROBOTIC REPAIR, HERNIA, INGUINAL;  Surgeon: Mark Anthony Araujo MD;  Location: [facility] OR;  Service: General;  Laterality: Left;    ROBOT-ASSISTED LYMPHADENECTOMY N/A 9/20/2021    Procedure: ROBOTIC LYMPHADENECTOMY;  Surgeon: Fidencio Morton MD;  Location: [facility] OR [address] [place] [address];  Service: Urology;  Laterality: N/A;        Home Medications:  Prior to Admission medications    Medication Sig Start Date End Date Taking? Authorizing Provider   amLODIPine (NORVASC) 5 MG tablet Take 1 tablet (5 mg total) by mouth once daily. 6/27/22  Yes Anmol Reyna MD   azelastine (ASTELIN) 137 mcg (0.1 %) nasal spray 1 spray by Nasal route 2 (two) times daily.    Yes Historical Provider   benzonatate (TESSALON) 200 MG capsule Take 200 mg by mouth 3 (three) times daily. 1/16/23  Yes Historical Provider   fluticasone propionate (FLONASE) 50 mcg/actuation nasal spray 2 sprays by Each Nostril route once daily. 5/13/20  Yes Historical Provider   hydroCHLOROthiazide (HYDRODIURIL) 12.5 MG Tab TAKE 1 TABLET(12.5 MG) BY MOUTH EVERY DAY 5/19/23  Yes Anmol Reyna MD   Lactobacillus acidophilus (PROBIOTIC) 10 billion cell Cap Take 1 tablet by mouth once daily.    Yes Historical Provider   pravastatin (PRAVACHOL) 40 MG tablet TAKE 1 TABLET BY MOUTH EVERY DAY 6/5/23  Yes Anmol Reyna MD   sod picosulf-mag ox-citric ac (CLENPIQ) 10 mg-3.5 gram- 12 gram/160 mL Soln Take 1 Box by mouth As instructed. 7/5/23  Yes Gildardo Kwon PA-C   valsartan (DIOVAN) 160 MG tablet TAKE 1 TABLET(160 MG) BY MOUTH EVERY DAY 6/2/23  Yes Anmol Reyna MD   amoxicillin-clavulanate 875-125mg (AUGMENTIN) 875-125 mg per tablet TAKE 1 TABLET BY MOUTH EVERY 12 HOURS FOR 21 DAYS 1/16/23   Historical Provider   ciprofloxacin-dexamethasone 0.3-0.1% (CIPRODEX) 0.3-0.1 % DrpS  12/30/20   Historical Provider   COVID-19 vac, tereza,Pfizer,,PF, (PFIZER COVID-19 TEREZA VACCN,PF,) 30  "mcg/0.3 mL injection Inject into the muscle. 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 16       Family History:  Family History   Problem Relation Age of Onset    Macular degeneration Maternal Uncle     Bladder Cancer Brother         smoker    Cataracts Mother     Fanconi anemia Mother     Leukemia Father 80    Coronary artery disease Father     Heart attack Father 72    Multiple myeloma Sister 59       ROS: No acute cardiac events, no acute respiratory complaints.     Physical Exam (all patients):    /77 (BP Location: Right arm, Patient Position: Sitting)   Pulse 66   Temp 97.2 °F (36.2 °C) (Temporal)   Resp 19   Ht 5' 10" (1.778 m)   Wt 96.3 kg (212 lb 6.6 oz)   SpO2 100%   BMI 30.48 kg/m²   Lungs: Clear to auscultation bilaterally, respirations unlabored  Heart: Regular rate and rhythm, S1 and S2 normal, no obvious murmurs  Abdomen:         Soft, non-tender, bowel sounds normal, no masses, no organomegaly    Lab Results   Component Value Date    WBC 6.57 09/14/2021    MCV 94 09/14/2021    RDW 12.4 09/14/2021     09/14/2021    GLU 78 06/01/2023    HGBA1C 5.4 10/01/2014    BUN 17 06/01/2023     06/01/2023    K 4.4 06/01/2023     06/01/2023        SEDATION PLAN: per anesthesia      History reviewed, vital signs satisfactory, cardiopulmonary status satisfactory, sedation options, risks and plans have been discussed with the patient  " All their questions were answered and the patient agrees to the sedation procedures as planned and the patient is deemed an appropriate candidate for the sedation as planned.    Procedure explained to patient, informed consent obtained and placed in chart.    Danielle Aguila  7/12/2023  9:14 AM

## 2023-07-12 NOTE — PROVATION PATIENT INSTRUCTIONS
Discharge Summary/Instructions after an Endoscopic Procedure  Patient Name: Kike Smith  Patient MRN: [id number]  Patient YOB: 1955  Wednesday, July 12, 2023  Danielle Aguila MD  Dear patient,  As a result of recent federal legislation (The Federal Cures Act), you may   receive lab or pathology results from your procedure in your [facility]   account before your physician is able to contact you. Your physician or   their representative will relay the results to you with their   recommendations at their soonest availability.  Thank you,  RESTRICTIONS:  During your procedure today, you received medications for sedation.  These   medications may affect your judgment, balance and coordination.  Therefore,   for 24 hours, you have the following restrictions:   - DO NOT drive a car, operate machinery, make legal/financial decisions,   sign important papers or drink alcohol.    ACTIVITY:  Today: no heavy lifting, straining or running due to procedural   sedation/anesthesia.  The following day: return to full activity including work.  DIET:  Eat and drink normally unless instructed otherwise.     TREATMENT FOR COMMON SIDE EFFECTS:  - Mild abdominal pain, nausea, belching, bloating or excessive gas:  rest,   eat lightly and use a heating pad.  - Sore Throat: treat with throat lozenges and/or gargle with warm salt   water.  - Because air was used during the procedure, expelling large amounts of air   from your rectum or belching is normal.  - If a bowel prep was taken, you may not have a bowel movement for 1-3 days.    This is normal.  SYMPTOMS TO WATCH FOR AND REPORT TO YOUR PHYSICIAN:  1. Abdominal pain or bloating, other than gas cramps.  2. Chest pain.  3. Back pain.  4. Signs of infection such as: chills or fever occurring within 24 hours   after the procedure.  5. Rectal bleeding, which would show as bright red, maroon, or black stools.   (A tablespoon of blood from the rectum is not serious, especially if    hemorrhoids are present.)  6. Vomiting.  7. Weakness or dizziness.  GO DIRECTLY TO THE NEAREST EMERGENCY ROOM IF YOU HAVE ANY OF THE FOLLOWING:      Difficulty breathing              Chills and/or fever over 101 F   Persistent vomiting and/or vomiting blood   Severe abdominal pain   Severe chest pain   Black, tarry stools   Bleeding- more than one tablespoon   Any other symptom or condition that you feel may need urgent attention  Your doctor recommends these additional instructions:  If any biopsies were taken, your doctors clinic will contact you in 1 to 2   weeks with any results.  - Patient has a contact number available for emergencies.  The signs and   symptoms of potential delayed complications were discussed with the   patient.  Return to normal activities tomorrow.  Written discharge   instructions were provided to the patient.   - Discharge patient to home (via wheelchair).   - Resume previous diet today.   - Continue present medications.   - Await pathology results.   - Repeat colonoscopy in 5 years for surveillance.  For questions, problems or results please call your physician Danielle Aguila MD at Work:  [phone]  If you have any questions about the above instructions, call the GI   department at [phone] or call the endoscopy unit at [phone]   from 7am until 3 pm.  [facility] [place] [facility], EMERGENCY ROOM PHONE NUMBER:   [phone]  IF A COMPLICATION OR EMERGENCY SITUATION ARISES AND YOU ARE UNABLE TO REACH   YOUR PHYSICIAN - GO DIRECTLY TO THE EMERGENCY ROOM.  I have read or have had read to me these discharge instructions for my   procedure and have received a written copy.  I understand these   instructions and will follow-up with my physician if I have any questions.     __________________________________       _____________________________________  Nurse Signature                                          Patient/Designated   Responsible Party Signature  Danielle  MD Danielle Aguila MD  7/12/2023 9:38:36 AM  This report has been verified and signed electronically.  Dear patient,  As a result of recent federal legislation (The Federal Cures Act), you may   receive lab or pathology results from your procedure in your [facility]   account before your physician is able to contact you. Your physician or   their representative will relay the results to you with their   recommendations at their soonest availability.  Thank you,  [employer]ATION

## 2023-07-12 NOTE — ANESTHESIA POSTPROCEDURE EVALUATION
Anesthesia Post Evaluation    Patient: Kike Smith    Procedure(s) Performed: Procedure(s) (LRB):  COLONOSCOPY (N/A)    Final Anesthesia Type: general      Patient location during evaluation: PACU  Patient participation: Yes- Able to Participate  Level of consciousness: awake and alert and oriented  Post-procedure vital signs: reviewed and stable  Pain management: adequate  Airway patency: patent    PONV status at discharge: No PONV  Anesthetic complications: no      Cardiovascular status: blood pressure returned to baseline, stable and hemodynamically stable  Respiratory status: unassisted  Hydration status: euvolemic  Follow-up not needed.          Vitals Value Taken Time   /75 07/12/23 0958   Temp 36.1 °C (97 °F) 07/12/23 0936   Pulse 57 07/12/23 0958   Resp 26 07/12/23 0958   SpO2 98 % 07/12/23 0958   Vitals shown include unvalidated device data.      No case tracking events are documented in the log.      Pain/Renée Score: Renée Score: 10 (7/12/2023  9:46 AM)

## 2023-07-12 NOTE — TRANSFER OF CARE
"Anesthesia Transfer of Care Note    Patient: Kike Smith    Procedure(s) Performed: Procedure(s) (LRB):  COLONOSCOPY (N/A)    Patient location: PACU    Anesthesia Type: general    Transport from OR: Transported from OR on room air with adequate spontaneous ventilation    Post pain: adequate analgesia    Post assessment: no apparent anesthetic complications    Post vital signs: stable    Level of consciousness: awake    Nausea/Vomiting: no nausea/vomiting    Complications: none    Transfer of care protocol was followed      Last vitals:   Visit Vitals  /77 (BP Location: Right arm, Patient Position: Sitting)   Pulse 66   Temp 36.2 °C (97.2 °F) (Temporal)   Resp 19   Ht 5' 10" (1.778 m)   Wt 96.3 kg (212 lb 6.6 oz)   SpO2 100%   BMI 30.48 kg/m²     "

## 2023-07-18 LAB
FINAL PATHOLOGIC DIAGNOSIS: NORMAL
Lab: NORMAL

## 2023-07-26 RX ORDER — AMLODIPINE BESYLATE 5 MG/1
TABLET ORAL
Qty: 90 TABLET | Refills: 3 | Status: SHIPPED | OUTPATIENT
Start: 2023-07-26

## 2023-07-26 NOTE — TELEPHONE ENCOUNTER
Refill Decision Note   Kike Luis  is requesting a refill authorization.  Brief Assessment and Rationale for Refill:  Approve     Medication Therapy Plan:       Medication Reconciliation Completed: No   Comments:     No Care Gaps recommended.     Note composed:2:48 PM 07/26/2023

## 2023-07-26 NOTE — TELEPHONE ENCOUNTER
No care due was identified.  [facility] Embedded Care Due Messages. Reference number: [id number].   7/26/2023 9:31:32 AM CDT

## 2023-08-03 ENCOUNTER — LAB VISIT (OUTPATIENT)
Dept: LAB | Facility: HOSPITAL | Age: 68
End: 2023-08-03
Attending: UROLOGY
Payer: MEDICARE

## 2023-08-03 DIAGNOSIS — C61 MALIGNANT NEOPLASM OF PROSTATE: ICD-10-CM

## 2023-08-03 LAB — COMPLEXED PSA SERPL-MCNC: <0.01 NG/ML (ref 0–4)

## 2023-08-03 PROCEDURE — 84153 ASSAY OF PSA TOTAL: CPT | Performed by: UROLOGY

## 2023-08-03 PROCEDURE — 36415 COLL VENOUS BLD VENIPUNCTURE: CPT | Performed by: UROLOGY

## 2023-08-03 NOTE — PROGRESS NOTES
Clinic Note  8/3/2023      Subjective:         Chief Complaint:   HPI  Kike Smith is a 67 y.o. male diagnosed with [id number] prostate cancer. Consult from Dr Darling.  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  Potent and continent prior to surgery. RALP (robotic assisted laparoscopic prostatectomy) 9/20/2021- path showed 1+ LN. [id number], [place] 4+3(GG3).  Reviewed path at last visit. Discussed N1 disease. Discussed observation, ADT +/- EBRT. Patient interested in pursuing surveillance.  Has MICHAELA, using 4-5 minipads/day. Doing kegel exercises 4x10. Symptoms worsened by HCTZ, will discuss with PCP.  Has ED, sees Dr. Chen. Continuing cialis.     FH -negative  PSA - 5.7  Stage - T1C  Volume - 17 ccs TRUS  MRI - n/a  Biopsy - 7/19/2021- [place] 4+3 left apex 1/2 40%; [place] 3+4 left base 1/2 20%, right apex 2/2 40%, right middle 1/2 30%; [place] 6 2/2 30%. Overall 7/12 cores positive.  Bone scan-negative  CT scan-negative  MOON Score - 5 intermediate risk  NCCN - Unfavorable intermediate  Germline testing - not indicated     3/18/2022- PSA 0.01  Has been working with PT. Continence improved, only leaks with cough.  Stopped Cialis due to GERD.     8/5/2022- PSA 0.01-6/21/2022. Has been working on hypertension control.     2/3/2023- PSA <0.01.     8/4/2023- PSA <0.01. 2years out from RALP (robotic assisted laparoscopic prostatectomy) with N1 GG3 disease.  Overall doing well. Had recent normal stress test and colonoscopy.  Continent but still uses security pad.  Son and DIL live in [place]. Work remotely for [employer].    Lab Results   Component Value Date    PSA 0.01 06/21/2022    PSA 5.5 (H) 06/18/2021    PSA 4.1 (H) 07/21/2020    PSA 3.9 03/19/2019    PSA 3.8 03/02/2018    PSA 3.7 09/20/2017    PSA 3.7 02/20/2017    PSA 2.6 08/23/2016    PSA 3.3 12/29/2015    PSA 2.3 10/01/2014    PSADIAG <0.01 08/03/2023    PSADIAG <0.01 02/02/2023    PSADIAG 0.01 03/14/2022    PSADIAG 0.01 11/02/2021     PSADIAG 5.7 (H) 07/12/2021         Past Medical History:   Diagnosis Date    Allergy     BPH (benign prostatic hyperplasia)     History of colon polyps 01/25/2018    He has had colon polyps since he was in his 30's.  He has had multiple colonoscopies and has had adenomatous polyps.    History of fracture 09/14/2021    History of inguinal hernia repair 09/14/2021    Hyperlipidemia     Hypertension     Inguinal hernia     IPMN (intraductal papillary mucinous neoplasm)     annual eus or mri    Obesity     MERRITT on CPAP     via ent dr wick 2022    Prostate cancer     Squamous cell carcinoma skin of arm      r arm;s/p mohs     Family History   Problem Relation Age of Onset    Macular degeneration Maternal Uncle     Bladder Cancer Brother         smoker    Cataracts Mother     Fanconi anemia Mother     Leukemia Father 80    Coronary artery disease Father     Heart attack Father 72    Multiple myeloma Sister 59     Social History     Socioeconomic History    Marital status:    Tobacco Use    Smoking status: Never    Smokeless tobacco: Never   Substance and Sexual Activity    Alcohol use: No     Comment: quit 1983    Drug use: Never   Social History Narrative     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 16     Social Determinants of Health     Financial Resource Strain: Low Risk  (1/9/2022)    Overall Financial Resource Strain (CARDIA)     Difficulty of Paying Living Expenses: Not hard at all   Food Insecurity: No Food Insecurity (1/9/2022)    Hunger Vital Sign     Worried About Running Out of Food in the Last Year: Never true     Ran Out of Food in the Last Year: Never true   Transportation Needs: No Transportation Needs (1/9/2022)    PRAPARE - Transportation     Lack of Transportation (Medical): No     Lack of Transportation (Non-Medical): No   Physical Activity: Sufficiently Active (1/9/2022)    Exercise Vital Sign      Days of Exercise per Week: 5 days     Minutes of Exercise per Session: 50 min   Stress: No Stress Concern Present (1/9/2022)    [employer] [place] [employer] - Occupational Stress Questionnaire     Feeling of Stress : Only a little   Social Connections: Unknown (1/9/2022)    Social Connection and Isolation Panel [NHANES]     Frequency of Communication with Friends and Family: More than three times a week   Housing Stability: Low Risk  (1/9/2022)    Housing Stability Vital Sign     Unable to Pay for Housing in the Last Year: No     Number of Places Lived in the Last Year: 1     Unstable Housing in the Last Year: No     Past Surgical History:   Procedure Laterality Date    CLOSURE OF DEFECT OF MOHS PROCEDURE  07/10/2020    dr reynoso    COLONOSCOPY N/A 1/25/2018    Procedure: COLONOSCOPY;  Surgeon: Juan A Walter MD;  Location: [facility];  Service: Endoscopy;  Laterality: N/A;    COLONOSCOPY N/A 7/12/2023    Procedure: COLONOSCOPY;  Surgeon: Danielle Aguila MD;  Location: [facility];  Service: Endoscopy;  Laterality: N/A;    ENDOSCOPIC ULTRASOUND OF UPPER GASTROINTESTINAL TRACT N/A 1/24/2022    Procedure: ULTRASOUND, ENDOSCOPIC, UPPER GI TRACT;  Surgeon: Daya Espinal MD;  Location: [facility];  Service: Endoscopy;  Laterality: N/A;    KNEE ARTHROSCOPY Left     ORIF TIBIA & FIBULA FRACTURES Left 08/10/2019    tib/fib fx    ROBOT-ASSISTED LAPAROSCOPIC PROSTATECTOMY USING DA ALEX XI N/A 9/20/2021    Procedure: XI ROBOTIC PROSTATECTOMY;  Surgeon: Fidencio Morton MD;  Location: [address];  Service: Urology;  Laterality: N/A;  3hr gen with regional    ROBOT-ASSISTED LAPAROSCOPIC REPAIR OF INGUINAL HERNIA USING DA ALEX XI Left 8/14/2020    Procedure: XI ROBOTIC REPAIR, HERNIA, INGUINAL;  Surgeon: Mark Anthony Araujo MD;  Location: [facility];  Service: General;  Laterality: Left;    ROBOT-ASSISTED LYMPHADENECTOMY N/A 9/20/2021    Procedure: ROBOTIC LYMPHADENECTOMY;  Surgeon: Fidencio Morton,  "MD;  Location: [facility] OR [address] [place] [address];  Service: Urology;  Laterality: N/A;     Patient Active Problem List   Diagnosis    Hypertension    Epididymal cyst    Hyperlipidemia    Renal cyst    Malignant neoplasm of prostate    IPMN (intraductal papillary mucinous neoplasm)    Colon cancer screening    Allergies    Overweight    Abnormal EKG    Snoring    Edema    Erectile dysfunction following radical prostatectomy    Decreased strength, endurance, and mobility    Urine incontinence    Pelvic floor weakness, male     Review of Systems   Constitutional:  Negative for appetite change, chills, fatigue, fever and unexpected weight change.   HENT:  Negative for nosebleeds.    Respiratory:  Negative for shortness of breath and wheezing.    Cardiovascular:  Negative for chest pain, palpitations and leg swelling.   Gastrointestinal:  Negative for abdominal distention, abdominal pain, constipation, diarrhea, nausea and vomiting.   Musculoskeletal:  Negative for arthralgias and back pain.   Skin:  Negative for pallor.   Neurological:  Negative for dizziness, seizures and syncope.   Hematological:  Negative for adenopathy.   Psychiatric/Behavioral:  Negative for dysphoric mood.          Objective:      There were no vitals taken for this visit.  Estimated body mass index is 30.48 kg/m² as calculated from the following:    Height as of 7/12/23: 5' 10" (1.778 m).    Weight as of 7/12/23: 96.3 kg (212 lb 6.6 oz).  Physical Exam  Constitutional:       General: He is not in acute distress.     Appearance: He is well-developed. He is not diaphoretic.   HENT:      Head: Atraumatic.   Neck:      Trachea: No tracheal deviation.   Cardiovascular:      Rate and Rhythm: Normal rate.   Pulmonary:      Effort: Pulmonary effort is normal. No respiratory distress.      Breath sounds: No wheezing.   Abdominal:      General: Bowel sounds are normal. There is no distension.      Palpations: Abdomen is soft. There is no mass.      Tenderness: There is no " abdominal tenderness. There is no guarding or rebound.   Skin:     General: Skin is warm and dry.   Neurological:      Mental Status: He is alert and oriented to person, place, and time.   Psychiatric:         Behavior: Behavior normal.         Thought Content: Thought content normal.         Judgment: Judgment normal.           Assessment and Plan:           Problem List Items Addressed This Visit       Malignant neoplasm of prostate - Primary       Follow up:   6 months with PSA    Fidencio Morton

## 2023-08-04 ENCOUNTER — OFFICE VISIT (OUTPATIENT)
Dept: UROLOGY | Facility: CLINIC | Age: 68
End: 2023-08-04
Payer: MEDICARE

## 2023-08-04 VITALS
BODY MASS INDEX: 30.39 KG/M2 | HEIGHT: 70 IN | HEART RATE: 74 BPM | SYSTOLIC BLOOD PRESSURE: 113 MMHG | DIASTOLIC BLOOD PRESSURE: 81 MMHG | WEIGHT: 212.31 LBS

## 2023-08-04 DIAGNOSIS — C61 MALIGNANT NEOPLASM OF PROSTATE: Primary | ICD-10-CM

## 2023-08-04 PROCEDURE — 1159F PR MEDICATION LIST DOCUMENTED IN MEDICAL RECORD: ICD-10-PCS | Mod: CPTII,S$GLB,, | Performed by: UROLOGY

## 2023-08-04 PROCEDURE — 4010F ACE/ARB THERAPY RXD/TAKEN: CPT | Mod: CPTII,S$GLB,, | Performed by: UROLOGY

## 2023-08-04 PROCEDURE — 99214 OFFICE O/P EST MOD 30 MIN: CPT | Mod: S$GLB,,, | Performed by: UROLOGY

## 2023-08-04 PROCEDURE — 3008F PR BODY MASS INDEX (BMI) DOCUMENTED: ICD-10-PCS | Mod: CPTII,S$GLB,, | Performed by: UROLOGY

## 2023-08-04 PROCEDURE — 1101F PR PT FALLS ASSESS DOC 0-1 FALLS W/OUT INJ PAST YR: ICD-10-PCS | Mod: CPTII,S$GLB,, | Performed by: UROLOGY

## 2023-08-04 PROCEDURE — 3288F FALL RISK ASSESSMENT DOCD: CPT | Mod: CPTII,S$GLB,, | Performed by: UROLOGY

## 2023-08-04 PROCEDURE — 3288F PR FALLS RISK ASSESSMENT DOCUMENTED: ICD-10-PCS | Mod: CPTII,S$GLB,, | Performed by: UROLOGY

## 2023-08-04 PROCEDURE — 99999 PR PBB SHADOW E&M-EST. PATIENT-LVL III: ICD-10-PCS | Mod: PBBFAC,,, | Performed by: UROLOGY

## 2023-08-04 PROCEDURE — 3079F PR MOST RECENT DIASTOLIC BLOOD PRESSURE 80-89 MM HG: ICD-10-PCS | Mod: CPTII,S$GLB,, | Performed by: UROLOGY

## 2023-08-04 PROCEDURE — 3074F SYST BP LT 130 MM HG: CPT | Mod: CPTII,S$GLB,, | Performed by: UROLOGY

## 2023-08-04 PROCEDURE — 3074F PR MOST RECENT SYSTOLIC BLOOD PRESSURE < 130 MM HG: ICD-10-PCS | Mod: CPTII,S$GLB,, | Performed by: UROLOGY

## 2023-08-04 PROCEDURE — 1101F PT FALLS ASSESS-DOCD LE1/YR: CPT | Mod: CPTII,S$GLB,, | Performed by: UROLOGY

## 2023-08-04 PROCEDURE — 3079F DIAST BP 80-89 MM HG: CPT | Mod: CPTII,S$GLB,, | Performed by: UROLOGY

## 2023-08-04 PROCEDURE — 1159F MED LIST DOCD IN RCRD: CPT | Mod: CPTII,S$GLB,, | Performed by: UROLOGY

## 2023-08-04 PROCEDURE — 99999 PR PBB SHADOW E&M-EST. PATIENT-LVL III: CPT | Mod: PBBFAC,,, | Performed by: UROLOGY

## 2023-08-04 PROCEDURE — 3008F BODY MASS INDEX DOCD: CPT | Mod: CPTII,S$GLB,, | Performed by: UROLOGY

## 2023-08-04 PROCEDURE — 99214 PR OFFICE/OUTPT VISIT, EST, LEVL IV, 30-39 MIN: ICD-10-PCS | Mod: S$GLB,,, | Performed by: UROLOGY

## 2023-08-04 PROCEDURE — 4010F PR ACE/ARB THEARPY RXD/TAKEN: ICD-10-PCS | Mod: CPTII,S$GLB,, | Performed by: UROLOGY

## 2023-08-04 NOTE — LETTER
August 4, 2023        Anmol Reyna MD  [address] [facility]  [place] [id number]             0[facility]  [address] [place] [address] [address] [place] [address]  Phone: [phone]  Fax: [phone]   Patient: Kike Smith   MR Number: [id number]   YOB: 1955   Date of Visit: 8/4/2023       Dear Dr. Reyna:    Thank you for referring Kike Smith to me for evaluation. Attached you will find relevant portions of my assessment and plan of care.    If you have questions, please do not hesitate to call me. I look forward to following Kike Smith along with you.    Sincerely,      Fidencio Morton MD            CC    No Recipients    Enclosure

## 2023-08-12 DIAGNOSIS — I10 PRIMARY HYPERTENSION: ICD-10-CM

## 2023-08-13 RX ORDER — HYDROCHLOROTHIAZIDE 12.5 MG/1
TABLET ORAL
Qty: 90 TABLET | Refills: 3 | Status: SHIPPED | OUTPATIENT
Start: 2023-08-13

## 2023-08-13 NOTE — TELEPHONE ENCOUNTER
Refill Decision Note   Kike Smith  is requesting a refill authorization.  Brief Assessment and Rationale for Refill:  Approve     Medication Therapy Plan:         Comments:     Note composed:3:39 PM 08/13/2023

## 2023-08-13 NOTE — TELEPHONE ENCOUNTER
No care due was identified.  [facility] Embedded Care Due Messages. Reference number: [id number].   8/13/2023 3:35:49 PM CDT   room air

## 2024-01-08 ENCOUNTER — PATIENT MESSAGE (OUTPATIENT)
Dept: UROLOGY | Facility: CLINIC | Age: 69
End: 2024-01-08
Payer: MEDICARE

## 2024-01-18 ENCOUNTER — PATIENT MESSAGE (OUTPATIENT)
Dept: DERMATOLOGY | Facility: CLINIC | Age: 69
End: 2024-01-18
Payer: MEDICARE

## 2024-02-15 ENCOUNTER — LAB VISIT (OUTPATIENT)
Dept: LAB | Facility: HOSPITAL | Age: 69
End: 2024-02-15
Attending: UROLOGY
Payer: MEDICARE

## 2024-02-15 DIAGNOSIS — C61 MALIGNANT NEOPLASM OF PROSTATE: ICD-10-CM

## 2024-02-15 PROCEDURE — 36415 COLL VENOUS BLD VENIPUNCTURE: CPT | Performed by: UROLOGY

## 2024-02-15 PROCEDURE — 84153 ASSAY OF PSA TOTAL: CPT | Performed by: UROLOGY

## 2024-02-15 NOTE — PROGRESS NOTES
Clinic Note  2/15/2024      Subjective:         Chief Complaint:   HPI  Kike Smith is a 68 y.o. male diagnosed with [id number] prostate cancer. Consult from Dr Darling.  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  Potent and continent prior to surgery. RALP (robotic assisted laparoscopic prostatectomy) 9/20/2021- path showed 1+ LN. [id number], [place] 4+3(GG3).  Reviewed path at last visit. Discussed N1 disease. Discussed observation, ADT +/- EBRT. Patient interested in pursuing surveillance.  Has MICHAELA, using 4-5 minipads/day. Doing kegel exercises 4x10. Symptoms worsened by HCTZ, will discuss with PCP.  Has ED, sees Dr. Chen. Continuing cialis.     FH -negative  PSA - 5.7  Stage - T1C  Volume - 17 ccs TRUS  MRI - n/a  Biopsy - 7/19/2021- [place] 4+3 left apex 1/2 40%; [place] 3+4 left base 1/2 20%, right apex 2/2 40%, right middle 1/2 30%; [place] 6 2/2 30%. Overall 7/12 cores positive.  Bone scan-negative  CT scan-negative  MOON Score - 5 intermediate risk  NCCN - Unfavorable intermediate  Germline testing - not indicated     3/18/2022- PSA 0.01  Has been working with PT. Continence improved, only leaks with cough.  Stopped Cialis due to GERD.     8/5/2022- PSA 0.01-6/21/2022. Has been working on hypertension control.     2/3/2023- PSA <0.01.     8/4/2023- PSA <0.01. 2 years out from RALP (robotic assisted laparoscopic prostatectomy) with N1 GG3 disease.  Overall doing well. Had recent normal stress test and colonoscopy.  Continent but still uses security pad.  Son and DIL live in [place]. Work remotely for [employer].    2/16/2024-PSA 0.01. Leaves for [employer] in 2 weeks.Here today with his wife Martha.      Lab Results   Component Value Date    PSA 0.01 06/21/2022    PSA 5.5 (H) 06/18/2021    PSA 4.1 (H) 07/21/2020    PSA 3.9 03/19/2019    PSA 3.8 03/02/2018    PSA 3.7 09/20/2017    PSA 3.7 02/20/2017    PSA 2.6 08/23/2016    PSA 3.3 12/29/2015    PSA 2.3 10/01/2014    PSADIAG <0.01 08/03/2023     PSADIAG <0.01 02/02/2023    PSADIAG 0.01 03/14/2022    PSADIAG 0.01 11/02/2021    PSADIAG 5.7 (H) 07/12/2021      Past Medical History:   Diagnosis Date    Allergy     BPH (benign prostatic hyperplasia)     History of colon polyps 01/25/2018    He has had colon polyps since he was in his 30's.  He has had multiple colonoscopies and has had adenomatous polyps.    History of fracture 09/14/2021    History of inguinal hernia repair 09/14/2021    Hyperlipidemia     Hypertension     Inguinal hernia     IPMN (intraductal papillary mucinous neoplasm)     annual eus or mri    Obesity     MERRITT on CPAP     via ent dr wick 2022    Prostate cancer     Squamous cell carcinoma skin of arm      r arm;s/p mohs     Family History   Problem Relation Age of Onset    Macular degeneration Maternal Uncle     Bladder Cancer Brother         smoker    Cataracts Mother     Fanconi anemia Mother     Leukemia Father 80    Coronary artery disease Father     Heart attack Father 72    Multiple myeloma Sister 59     Social History     Socioeconomic History    Marital status:    Tobacco Use    Smoking status: Never    Smokeless tobacco: Never   Substance and Sexual Activity    Alcohol use: No     Comment: quit 1983    Drug use: Never    Sexual activity: Not Currently   Social History Narrative     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 16     Social Determinants of Health     Financial Resource Strain: Low Risk  (1/9/2022)    Overall Financial Resource Strain (CARDIA)     Difficulty of Paying Living Expenses: Not hard at all   Food Insecurity: No Food Insecurity (1/9/2022)    Hunger Vital Sign     Worried About Running Out of Food in the Last Year: Never true     Ran Out of Food in the Last Year: Never true   Transportation Needs: No Transportation Needs (1/9/2022)    PRAPARE - Transportation     Lack of Transportation (Medical): No      Lack of Transportation (Non-Medical): No   Physical Activity: Sufficiently Active (1/9/2022)    Exercise Vital Sign     Days of Exercise per Week: 5 days     Minutes of Exercise per Session: 50 min   Stress: No Stress Concern Present (1/9/2022)    [employer] [place] [employer] - Occupational Stress Questionnaire     Feeling of Stress : Only a little   Social Connections: Unknown (1/9/2022)    Social Connection and Isolation Panel [NHANES]     Frequency of Communication with Friends and Family: More than three times a week   Housing Stability: Low Risk  (1/9/2022)    Housing Stability Vital Sign     Unable to Pay for Housing in the Last Year: No     Number of Places Lived in the Last Year: 1     Unstable Housing in the Last Year: No     Past Surgical History:   Procedure Laterality Date    CLOSURE OF DEFECT OF MOHS PROCEDURE  07/10/2020    dr reynoso    COLONOSCOPY N/A 1/25/2018    Procedure: COLONOSCOPY;  Surgeon: Juan A Walter MD;  Location: [facility];  Service: Endoscopy;  Laterality: N/A;    COLONOSCOPY N/A 7/12/2023    Procedure: COLONOSCOPY;  Surgeon: Danielle Aguila MD;  Location: [facility];  Service: Endoscopy;  Laterality: N/A;    ENDOSCOPIC ULTRASOUND OF UPPER GASTROINTESTINAL TRACT N/A 1/24/2022    Procedure: ULTRASOUND, ENDOSCOPIC, UPPER GI TRACT;  Surgeon: Daya Espinal MD;  Location: [facility];  Service: Endoscopy;  Laterality: N/A;    KNEE ARTHROSCOPY Left     ORIF TIBIA & FIBULA FRACTURES Left 08/10/2019    tib/fib fx    ROBOT-ASSISTED LAPAROSCOPIC PROSTATECTOMY USING DA ALEX XI N/A 9/20/2021    Procedure: XI ROBOTIC PROSTATECTOMY;  Surgeon: Fidencio Morton MD;  Location: [address];  Service: Urology;  Laterality: N/A;  3hr gen with regional    ROBOT-ASSISTED LAPAROSCOPIC REPAIR OF INGUINAL HERNIA USING DA ALEX XI Left 8/14/2020    Procedure: XI ROBOTIC REPAIR, HERNIA, INGUINAL;  Surgeon: Mark Anthony Araujo MD;  Location: [facility];  Service: General;  Laterality: Left;  "   ROBOT-ASSISTED LYMPHADENECTOMY N/A 9/20/2021    Procedure: ROBOTIC LYMPHADENECTOMY;  Surgeon: Fidencio Morton MD;  Location: [facility] OR [address] [place] [address];  Service: Urology;  Laterality: N/A;     Patient Active Problem List   Diagnosis    Hypertension    Epididymal cyst    Hyperlipidemia    Renal cyst    Malignant neoplasm of prostate    IPMN (intraductal papillary mucinous neoplasm)    Colon cancer screening    Allergies    Overweight    Abnormal EKG    Snoring    Edema    Erectile dysfunction following radical prostatectomy    Decreased strength, endurance, and mobility    Urine incontinence    Pelvic floor weakness, male     Review of Systems   Constitutional:  Negative for appetite change, chills, fatigue, fever and unexpected weight change.   HENT:  Negative for nosebleeds.    Respiratory:  Negative for shortness of breath and wheezing.    Cardiovascular:  Negative for chest pain, palpitations and leg swelling.   Gastrointestinal:  Negative for abdominal distention, abdominal pain, constipation, diarrhea, nausea and vomiting.   Genitourinary:  Negative for dysuria and hematuria.   Musculoskeletal:  Negative for arthralgias and back pain.   Skin:  Negative for pallor.   Neurological:  Negative for dizziness, seizures and syncope.   Hematological:  Negative for adenopathy.   Psychiatric/Behavioral:  Negative for dysphoric mood.          Objective:      There were no vitals taken for this visit.  Estimated body mass index is 30.46 kg/m² as calculated from the following:    Height as of 8/4/23: 5' 10" (1.778 m).    Weight as of 8/4/23: 96.3 kg (212 lb 4.9 oz).  Physical Exam      Assessment and Plan:           Problem List Items Addressed This Visit       Malignant neoplasm of prostate - Primary       Follow up:   6 months with PSA.    Fidencio Morton          "

## 2024-02-16 ENCOUNTER — OFFICE VISIT (OUTPATIENT)
Dept: UROLOGY | Facility: CLINIC | Age: 69
End: 2024-02-16
Payer: MEDICARE

## 2024-02-16 VITALS
SYSTOLIC BLOOD PRESSURE: 121 MMHG | HEART RATE: 79 BPM | BODY MASS INDEX: 31.49 KG/M2 | WEIGHT: 219.94 LBS | RESPIRATION RATE: 14 BRPM | DIASTOLIC BLOOD PRESSURE: 79 MMHG | HEIGHT: 70 IN

## 2024-02-16 DIAGNOSIS — C61 MALIGNANT NEOPLASM OF PROSTATE: Primary | ICD-10-CM

## 2024-02-16 LAB — COMPLEXED PSA SERPL-MCNC: 0.01 NG/ML (ref 0–4)

## 2024-02-16 PROCEDURE — 1160F RVW MEDS BY RX/DR IN RCRD: CPT | Mod: CPTII,S$GLB,, | Performed by: UROLOGY

## 2024-02-16 PROCEDURE — 3074F SYST BP LT 130 MM HG: CPT | Mod: CPTII,S$GLB,, | Performed by: UROLOGY

## 2024-02-16 PROCEDURE — 99999 PR PBB SHADOW E&M-EST. PATIENT-LVL III: CPT | Mod: PBBFAC,,, | Performed by: UROLOGY

## 2024-02-16 PROCEDURE — 99214 OFFICE O/P EST MOD 30 MIN: CPT | Mod: S$GLB,,, | Performed by: UROLOGY

## 2024-02-16 PROCEDURE — 3008F BODY MASS INDEX DOCD: CPT | Mod: CPTII,S$GLB,, | Performed by: UROLOGY

## 2024-02-16 PROCEDURE — 3078F DIAST BP <80 MM HG: CPT | Mod: CPTII,S$GLB,, | Performed by: UROLOGY

## 2024-02-16 PROCEDURE — 1159F MED LIST DOCD IN RCRD: CPT | Mod: CPTII,S$GLB,, | Performed by: UROLOGY

## 2024-03-14 ENCOUNTER — OFFICE VISIT (OUTPATIENT)
Dept: DERMATOLOGY | Facility: CLINIC | Age: 69
End: 2024-03-14
Payer: MEDICARE

## 2024-03-14 DIAGNOSIS — Z85.828 HISTORY OF SKIN CANCER: ICD-10-CM

## 2024-03-14 DIAGNOSIS — D48.5 NEOPLASM OF UNCERTAIN BEHAVIOR OF SKIN: ICD-10-CM

## 2024-03-14 DIAGNOSIS — D22.9 MULTIPLE BENIGN NEVI: ICD-10-CM

## 2024-03-14 DIAGNOSIS — L90.5 SCAR CONDITIONS/SKIN FIBROSIS: Primary | ICD-10-CM

## 2024-03-14 DIAGNOSIS — L82.1 SEBORRHEIC KERATOSIS: ICD-10-CM

## 2024-03-14 DIAGNOSIS — Z12.83 SCREENING, MALIGNANT NEOPLASM, SKIN: ICD-10-CM

## 2024-03-14 PROCEDURE — 88305 TISSUE EXAM BY PATHOLOGIST: CPT | Performed by: PATHOLOGY

## 2024-03-14 PROCEDURE — 99214 OFFICE O/P EST MOD 30 MIN: CPT | Mod: 25,S$GLB,, | Performed by: DERMATOLOGY

## 2024-03-14 PROCEDURE — 1101F PT FALLS ASSESS-DOCD LE1/YR: CPT | Mod: CPTII,S$GLB,, | Performed by: DERMATOLOGY

## 2024-03-14 PROCEDURE — 99999 PR PBB SHADOW E&M-EST. PATIENT-LVL III: CPT | Mod: PBBFAC,,, | Performed by: DERMATOLOGY

## 2024-03-14 PROCEDURE — 1160F RVW MEDS BY RX/DR IN RCRD: CPT | Mod: CPTII,S$GLB,, | Performed by: DERMATOLOGY

## 2024-03-14 PROCEDURE — 88305 TISSUE EXAM BY PATHOLOGIST: CPT | Mod: 26,,, | Performed by: PATHOLOGY

## 2024-03-14 PROCEDURE — 88342 IMHCHEM/IMCYTCHM 1ST ANTB: CPT | Mod: 26,,, | Performed by: PATHOLOGY

## 2024-03-14 PROCEDURE — 11102 TANGNTL BX SKIN SINGLE LES: CPT | Mod: S$GLB,,, | Performed by: DERMATOLOGY

## 2024-03-14 PROCEDURE — 88342 IMHCHEM/IMCYTCHM 1ST ANTB: CPT | Performed by: PATHOLOGY

## 2024-03-14 PROCEDURE — 1126F AMNT PAIN NOTED NONE PRSNT: CPT | Mod: CPTII,S$GLB,, | Performed by: DERMATOLOGY

## 2024-03-14 PROCEDURE — 3288F FALL RISK ASSESSMENT DOCD: CPT | Mod: CPTII,S$GLB,, | Performed by: DERMATOLOGY

## 2024-03-14 PROCEDURE — 1159F MED LIST DOCD IN RCRD: CPT | Mod: CPTII,S$GLB,, | Performed by: DERMATOLOGY

## 2024-03-14 NOTE — PROGRESS NOTES
Subjective:       Patient ID:  Kike Smith is a 68 y.o. male who presents for   Chief Complaint   Patient presents with    Skin Check     Fbse. Hx of skin cancer. Concerned about dark spot on right temple. Pt reports noticing it more.       Hx of SCC of the right forearm (s/p Mohs by Dr. 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  He c/o lesion of the right temple, possibly new. No tx tried.       This is a high risk patient here to check for the development of new lesions. Denies bleeding, tender, growing, or concerning lesions.     Review of Systems   Constitutional:  Negative for fever and chills.   Gastrointestinal:  Negative for nausea and vomiting.   Skin:  Positive for activity-related sunscreen use. Negative for daily sunscreen use and recent sunburn.   Hematologic/Lymphatic: Does not bruise/bleed easily.        Objective:    Physical Exam   Constitutional: He appears well-developed and well-nourished. No distress.   Genitourinary:       Neurological: He is alert and oriented to person, place, and time. He is not disoriented.   Psychiatric: He has a normal mood and affect.   Skin:   Areas Examined (abnormalities noted in diagram):   Scalp / Hair Palpated and Inspected  Head / Face Inspection Performed  Neck Inspection Performed  Chest / Axilla Inspection Performed  Abdomen Inspection Performed  Back Inspection Performed  RUE Inspected  LUE Inspection Performed  RLE Inspected  LLE Inspection Performed  Nails and Digits Inspection Performed                       Diagram Legend     Erythematous scaling macule/papule c/w actinic keratosis       Vascular papule c/w angioma      Pigmented verrucoid papule/plaque c/w seborrheic keratosis      Yellow umbilicated papule c/w sebaceous hyperplasia      Irregularly shaped tan macule c/w lentigo     1-2 mm smooth white papules consistent with Milia       Movable subcutaneous cyst with punctum c/w epidermal inclusion cyst      Subcutaneous movable cyst c/w pilar cyst      Firm pink to brown papule c/w dermatofibroma      Pedunculated fleshy papule(s) c/w skin tag(s)      Evenly pigmented macule c/w junctional nevus     Mildly variegated pigmented, slightly irregular-bordered macule c/w mildly atypical nevus      Flesh colored to evenly pigmented papule c/w intradermal nevus       Pink pearly papule/plaque c/w basal cell carcinoma      Erythematous hyperkeratotic cursted plaque c/w SCC      Surgical scar with no sign of skin cancer recurrence      Open and closed comedones      Inflammatory papules and pustules      Verrucoid papule consistent consistent with wart     Erythematous eczematous patches and plaques     Dystrophic onycholytic nail with subungual debris c/w onychomycosis     Umbilicated papule    Erythematous-base heme-crusted tan verrucoid plaque consistent with inflamed seborrheic keratosis     Erythematous Silvery Scaling Plaque c/w Psoriasis     See annotation        Assessment / Plan:        Scar conditions/skin fibrosis  Screening, malignant neoplasm, skin  History of skin cancer  Scar of the right forearm, hx of NMSC.  No evidence of recurrence on physical exam today.  Continue routine skin surveillance. Daily sunscreen advised.    Area(s) of previous NMSC evaluated with no signs of recurrence.    full body skin examination performed today including at least 12 points as noted in physical examination. No lesions suspicious for malignancy noted.      Seborrheic keratosis  Reassurance given. Discussed diagnosis and that lesions are benign.  AAD handout given.     Multiple benign nevi  Reassurance given.  Discussed ABCDEF of melanoma and changes for patient to look for.  AAD Handout given. Discussed importance of daily use of sunscreen which is broad-spectrum and has a minimum SPF of 30.    Neoplasm of uncertain behavior of skin  -     Specimen to  Pathology, Dermatology  -     Shave biopsy(-ies) done of 1 site(s).   Patient informed to call for results within 2 weeks if have not [facility] notification [facility]           Follow up in about 1 year (around 3/14/2025).    PROCEDURE NOTE - SHAVE BIOPSY   Location: see above    After risk, benefits, and alternatives were discussed with the patient, the patient agrees to the procedure by verbal informed consent.  The area(s) were cleansed with alcohol. 2 cc of lidocaine 1% with epinephrine was injected for local anesthesia into each lesion(s).  A sharp dermablade was used to remove part or all of the lesion(s).  The specimen(s) will be sent for tissue pathology.  Hemostasis was obtained with aluminum chloride and/or hyfrecation.  The area(s) were dressed with vaseline ointment and bandaged.  The patient tolerated the procedure well without adverse events.  Wound care instructions were given to the patient on the AVS.  The patient will be notified of pathology results once available. Results will also be available in Epic.

## 2024-03-14 NOTE — PATIENT INSTRUCTIONS
Shave Biopsy Wound Care    Your doctor has performed a shave biopsy today.  A band aid and vaseline ointment has been placed over the site.  This should remain in place for 24 hours.  It is recommended that you keep the area dry for the first 24 hours.  After 24 hours, you may remove the band aid and wash the area with warm soap and water and apply Vaseline jelly.  Many patients prefer to use Neosporin or Bacitracin ointment.  This is acceptable; however, know that you can develop an allergy to this medication even if you have used it safely for years.  It is important to keep the area moist.  Letting it dry out and get air slows healing time, and will worsen the scar.  Band aid is optional after first 24 hours.      If you notice increasing redness, tenderness, pain, or yellow drainage at the biopsy site, please notify your doctor.  These are signs of an infection.    If your biopsy site is bleeding, apply firm pressure for 15 minutes straight.  Repeat for another 15 minutes, if it is still bleeding.   If the surgical site continues to bleed, then please contact your doctor.      [facility]   DERMATOLOGY  [address] [facility] [address]   Dept: [phone]   Dept Fax: [phone]        Monitoring Moles  Moles, also called nevi, are small, pigmented (colored) marks on the skin. They have no known purpose. Many moles appear before age 30, but they also increase frequently as people age. Moles most often are benign (not cancer) and harmless. But some become cancerous. Thats why you need to watch the moles on your body and tell your health care provider about any concern you.    What are moles?  Moles are a type of pigmented earlene. Freckles, which often are sprinkled across the bridge of the nose, the cheeks, and the arms, are another type of pigmented earlene. Moles can appear on any part of the body. There are many types, sizes, and shapes of moles. Most moles  are solid brown. In most cases, they are flat or dome-shaped, smooth, and have well-defined edges. Freckles are flat.  Why worry about moles?  Most moles are benign and dont require treatment. You can have moles removed if you dont like the way they look or feel. But moles that appear after you are 30 or that change in certain ways may become a problem. These moles may turn into melanoma, a type of skin cancer. Melanoma is one of the fastest growing cancers in the [place], but it is often curable if caught early. But this disease can be life-threatening, particularly when not diagnosed early. The more moles someone has, the higher the risk. Risk is also higher for those who have had more lifetime exposure to the sun, severe blistering sunburns, exposure to tanning beds, a prior personal history of cancer, and those with a family history of skin cancer. To manage your risk, its smart to check your moles for changes and ask your health care provider to perform a thorough skin exam when you have a physical exam. To do this, you first need to learn where your moles are. Then, be sure to check your moles each month.    Checking your moles  You can check many of your moles each month. You can do this right after you shower and before you get dressed. Check your body from head to toe. Then, make a list of your moles. If you find any new moles or changes in your moles, call your health care provider. To check your moles, youll need:  A full-length mirror  A stool or chair to sit on while you check your feet  If you have a lot of moles, take digital photos of them each month. Make sure to take photos both up close and from a distance. These can help you see if any moles change over time.  When to seek medical treatment  See your health care provider if your moles hurt, itch, ooze, bleed, thicken, become crusty, or show other changes. Also, be sure to call your health care provider if your moles show any of the  following signs of melanoma:  A change in size, shape, color, or elevation  Asymmetry (when the sides dont match)  Ragged, notched, or blurred borders  Varied colors within the same mole  Size is larger than 5 mm or 6 mm in diameter (the size of a pencil eraser)  © 6112-6336 [employer]. [address] [place] [address]. All rights reserved. This information is not intended as a substitute for professional medical care. Always follow your healthcare professional's instructions.

## 2024-03-22 LAB
FINAL PATHOLOGIC DIAGNOSIS: NORMAL
GROSS: NORMAL
Lab: NORMAL
MICROSCOPIC EXAM: NORMAL

## 2024-03-28 ENCOUNTER — PATIENT MESSAGE (OUTPATIENT)
Dept: DERMATOLOGY | Facility: CLINIC | Age: 69
End: 2024-03-28
Payer: MEDICARE

## 2024-04-13 ENCOUNTER — PATIENT MESSAGE (OUTPATIENT)
Dept: ADMINISTRATIVE | Facility: OTHER | Age: 69
End: 2024-04-13
Payer: MEDICARE

## 2024-05-24 ENCOUNTER — TELEPHONE (OUTPATIENT)
Dept: INTERNAL MEDICINE | Facility: CLINIC | Age: 69
End: 2024-05-24
Payer: MEDICARE

## 2024-05-24 NOTE — TELEPHONE ENCOUNTER
Tried calling the patient concerning his appointment on 6/3/24 with Dr Reyna with no answer. A message was left for a return call to Dr Reyna office to reschedule his appointment on 6/3/24.

## 2024-05-27 ENCOUNTER — TELEPHONE (OUTPATIENT)
Dept: INTERNAL MEDICINE | Facility: CLINIC | Age: 69
End: 2024-05-27
Payer: MEDICARE

## 2024-05-27 NOTE — TELEPHONE ENCOUNTER
Tried calling the patient no answer. A message was left for the patient to return the call to Dr Reyna office. The patient appointment need to be  reschedule on 6/3/24.

## 2024-05-27 NOTE — TELEPHONE ENCOUNTER
----- Message from Margaux Jay sent at 5/27/2024 10:50 AM CDT -----  Regarding: concerns  Name of who is calling:   Kike      What is the request in detail: Pt is requesting a call back in ref to rescheduling 06/03/2024      Can the clinic reply by [facility]:salvador      What number to call back if not [facility]: [phone]

## 2024-05-27 NOTE — TELEPHONE ENCOUNTER
Spoke with the patient and rescheduled his lab and office visit. Reminder notice mailed out to the patient also, the patient can view his appointments on his my chart.

## 2024-05-27 NOTE — TELEPHONE ENCOUNTER
----- Message from Patria Cadet sent at 5/27/2024 10:03 AM CDT -----  .Type:  Patient Returning Call    Who Called:.Kike Smith   Who Left Message for Patient:  Does the patient know what this is regarding?: up coming appt  Would the patient rather a call back or a response via [facility]? Call back  Best Call Back Number:.[phone]   Additional Information:

## 2024-06-07 ENCOUNTER — LAB VISIT (OUTPATIENT)
Dept: LAB | Facility: HOSPITAL | Age: 69
End: 2024-06-07
Attending: FAMILY MEDICINE
Payer: MEDICARE

## 2024-06-07 DIAGNOSIS — I10 PRIMARY HYPERTENSION: ICD-10-CM

## 2024-06-07 DIAGNOSIS — Z86.39 HISTORY OF OBESITY: ICD-10-CM

## 2024-06-07 LAB
ALBUMIN SERPL BCP-MCNC: 3.6 G/DL (ref 3.5–5.2)
ALP SERPL-CCNC: 73 U/L (ref 55–135)
ALT SERPL W/O P-5'-P-CCNC: 17 U/L (ref 10–44)
ANION GAP SERPL CALC-SCNC: 4 MMOL/L (ref 8–16)
AST SERPL-CCNC: 16 U/L (ref 10–40)
BILIRUB SERPL-MCNC: 0.6 MG/DL (ref 0.1–1)
BUN SERPL-MCNC: 18 MG/DL (ref 8–23)
CALCIUM SERPL-MCNC: 9.5 MG/DL (ref 8.7–10.5)
CHLORIDE SERPL-SCNC: 103 MMOL/L (ref 95–110)
CHOLEST SERPL-MCNC: 140 MG/DL (ref 120–199)
CHOLEST/HDLC SERPL: 4.2 {RATIO} (ref 2–5)
CO2 SERPL-SCNC: 33 MMOL/L (ref 23–29)
CREAT SERPL-MCNC: 0.9 MG/DL (ref 0.5–1.4)
EST. GFR  (NO RACE VARIABLE): >60 ML/MIN/1.73 M^2
ESTIMATED AVG GLUCOSE: 105 MG/DL (ref 68–131)
GLUCOSE SERPL-MCNC: 91 MG/DL (ref 70–110)
HBA1C MFR BLD: 5.3 % (ref 4–5.6)
HDLC SERPL-MCNC: 33 MG/DL (ref 40–75)
HDLC SERPL: 23.6 % (ref 20–50)
LDLC SERPL CALC-MCNC: 94 MG/DL (ref 63–159)
NONHDLC SERPL-MCNC: 107 MG/DL
POTASSIUM SERPL-SCNC: 4.1 MMOL/L (ref 3.5–5.1)
PROT SERPL-MCNC: 7.3 G/DL (ref 6–8.4)
SODIUM SERPL-SCNC: 140 MMOL/L (ref 136–145)
TRIGL SERPL-MCNC: 65 MG/DL (ref 30–150)

## 2024-06-07 PROCEDURE — 80053 COMPREHEN METABOLIC PANEL: CPT | Performed by: FAMILY MEDICINE

## 2024-06-07 PROCEDURE — 80061 LIPID PANEL: CPT | Performed by: FAMILY MEDICINE

## 2024-06-07 PROCEDURE — 36415 COLL VENOUS BLD VENIPUNCTURE: CPT | Performed by: FAMILY MEDICINE

## 2024-06-07 PROCEDURE — 83036 HEMOGLOBIN GLYCOSYLATED A1C: CPT | Performed by: FAMILY MEDICINE

## 2024-06-07 NOTE — TELEPHONE ENCOUNTER
Refill Routing Note   Medication(s) are not appropriate for processing by [facility] for the following reason(s):        Required labs outdated    [facility] action(s):  Defer     Requires labs : Yes             Appointments  past 12m or future 3m with PCP    Date Provider   Last Visit   6/1/2023 Anmol Reyna MD   Next Visit   6/10/2024 Anmol Reyna MD   ED visits in past 90 days: 0        Note composed:9:34 AM 06/07/2024

## 2024-06-07 NOTE — TELEPHONE ENCOUNTER
Care Due:                  Date            Visit Type   Department     Provider  --------------------------------------------------------------------------------                                MYCHART                              FOLLOWUP/OF  [facility] INTERNAL  Last Visit: 06-      FICE VISIT   MEDICINE       Anmol Reyna                              EP -                              PRIMARY      [facility] INTERNAL  Next Visit: 06-      CARE ([facility])   MEDICINE       Anmol Reyna                                                            Last  Test          Frequency    Reason                     Performed    Due Date  --------------------------------------------------------------------------------    CMP.........  12 months..  hydroCHLOROthiazide,       06- 05-                             pravastatin, valsartan...    Lipid Panel.  12 months..  pravastatin..............  06- 05-    [facility] Embedded Care Due Messages. Reference number: [id number].   6/07/2024 9:08:06 AM CDT

## 2024-06-10 ENCOUNTER — OFFICE VISIT (OUTPATIENT)
Dept: INTERNAL MEDICINE | Facility: CLINIC | Age: 69
End: 2024-06-10
Payer: MEDICARE

## 2024-06-10 VITALS
HEIGHT: 70 IN | TEMPERATURE: 98 F | SYSTOLIC BLOOD PRESSURE: 120 MMHG | BODY MASS INDEX: 31.22 KG/M2 | WEIGHT: 218.06 LBS | DIASTOLIC BLOOD PRESSURE: 80 MMHG | OXYGEN SATURATION: 96 % | HEART RATE: 68 BPM

## 2024-06-10 DIAGNOSIS — I10 PRIMARY HYPERTENSION: ICD-10-CM

## 2024-06-10 DIAGNOSIS — Z85.46 HISTORY OF PROSTATE CANCER: ICD-10-CM

## 2024-06-10 DIAGNOSIS — D49.0 IPMN (INTRADUCTAL PAPILLARY MUCINOUS NEOPLASM): ICD-10-CM

## 2024-06-10 DIAGNOSIS — Z00.00 ROUTINE HEALTH MAINTENANCE: Primary | ICD-10-CM

## 2024-06-10 PROCEDURE — 1126F AMNT PAIN NOTED NONE PRSNT: CPT | Mod: CPTII,S$GLB,, | Performed by: FAMILY MEDICINE

## 2024-06-10 PROCEDURE — 4010F ACE/ARB THERAPY RXD/TAKEN: CPT | Mod: CPTII,S$GLB,, | Performed by: FAMILY MEDICINE

## 2024-06-10 PROCEDURE — 3079F DIAST BP 80-89 MM HG: CPT | Mod: CPTII,S$GLB,, | Performed by: FAMILY MEDICINE

## 2024-06-10 PROCEDURE — 1159F MED LIST DOCD IN RCRD: CPT | Mod: CPTII,S$GLB,, | Performed by: FAMILY MEDICINE

## 2024-06-10 PROCEDURE — 99999 PR PBB SHADOW E&M-EST. PATIENT-LVL III: CPT | Mod: PBBFAC,,, | Performed by: FAMILY MEDICINE

## 2024-06-10 PROCEDURE — 3288F FALL RISK ASSESSMENT DOCD: CPT | Mod: CPTII,S$GLB,, | Performed by: FAMILY MEDICINE

## 2024-06-10 PROCEDURE — 3008F BODY MASS INDEX DOCD: CPT | Mod: CPTII,S$GLB,, | Performed by: FAMILY MEDICINE

## 2024-06-10 PROCEDURE — 3044F HG A1C LEVEL LT 7.0%: CPT | Mod: CPTII,S$GLB,, | Performed by: FAMILY MEDICINE

## 2024-06-10 PROCEDURE — 99397 PER PM REEVAL EST PAT 65+ YR: CPT | Mod: S$GLB,,, | Performed by: FAMILY MEDICINE

## 2024-06-10 PROCEDURE — 1101F PT FALLS ASSESS-DOCD LE1/YR: CPT | Mod: CPTII,S$GLB,, | Performed by: FAMILY MEDICINE

## 2024-06-10 PROCEDURE — 3074F SYST BP LT 130 MM HG: CPT | Mod: CPTII,S$GLB,, | Performed by: FAMILY MEDICINE

## 2024-06-10 RX ORDER — CEFUROXIME AXETIL 250 MG/1
250 TABLET ORAL 2 TIMES DAILY
COMMUNITY
Start: 2023-12-31

## 2024-06-10 RX ORDER — PRAVASTATIN SODIUM 40 MG/1
40 TABLET ORAL DAILY
Qty: 90 TABLET | Refills: 4 | Status: SHIPPED | OUTPATIENT
Start: 2024-06-10

## 2024-06-10 NOTE — PROGRESS NOTES
Subjective:       Patient ID: Kike Smith is a  male.    Chief Complaint: Annual Exam    HPIhere for phys exam no c/o;  htn controlled and nl bps;baudilio statin; baudilio statin;ll. Hx 50 min daily stationary bike;digital med bps fine    fam hx bladder ca/hematuria nl w./u(brother bladder ca /smoker)renal cyst : nl hmaturia w/u yrs ago.     Prost cahx utd urol      Chronic sinus issues hx ent dr wick astelin/flonase hx (off now); finishing up cefuroxin        Pancreatic lesion serena ct spring 23 likely lipoma w plan serena spring 24      Past Medical History:   Diagnosis Date    Allergy     BPH (benign prostatic hyperplasia)     History of colon polyps 1/25/2018    He has had colon polyps since he was in his 30's.  He has had multiple colonoscopies and has had adenomatous polyps.    History of fracture 9/14/2021    History of inguinal hernia repair 9/14/2021    Hyperlipidemia     Hypertension     Inguinal hernia     IPMN (intraductal papillary mucinous neoplasm)     annual eus or mri    Obesity     Prostate cancer     Squamous cell carcinoma skin of arm      r arm;s/p mohs     Past Surgical History:   Procedure Laterality Date    CLOSURE OF DEFECT OF MOHS PROCEDURE  07/10/2020    dr reynoso    COLONOSCOPY N/A 1/25/2018    Procedure: COLONOSCOPY;  Surgeon: Juan A Walter MD;  Location: [facility];  Service: Endoscopy;  Laterality: N/A;    ENDOSCOPIC ULTRASOUND OF UPPER GASTROINTESTINAL TRACT N/A 1/24/2022    Procedure: ULTRASOUND, ENDOSCOPIC, UPPER GI TRACT;  Surgeon: Daya Espinal MD;  Location: [facility];  Service: Endoscopy;  Laterality: N/A;    KNEE ARTHROSCOPY Left     ORIF TIBIA & FIBULA FRACTURES Left 08/10/2019    tib/fib fx    ROBOT-ASSISTED LAPAROSCOPIC PROSTATECTOMY USING DA ALEX XI N/A 9/20/2021    Procedure: XI ROBOTIC PROSTATECTOMY;  Surgeon: Fidencio Morton MD;  Location: [facility] OR [address] [place] [address];  Service: Urology;  Laterality: N/A;  3hr gen with regional    ROBOT-ASSISTED LAPAROSCOPIC REPAIR OF  INGUINAL HERNIA USING DA ALEX XI Left 8/14/2020    Procedure: XI ROBOTIC REPAIR, HERNIA, INGUINAL;  Surgeon: Mark Anthony Araujo MD;  Location: [facility] OR;  Service: General;  Laterality: Left;    ROBOT-ASSISTED LYMPHADENECTOMY N/A 9/20/2021    Procedure: ROBOTIC LYMPHADENECTOMY;  Surgeon: Fidencio Morton MD;  Location: [facility] OR [address] [place] [address];  Service: Urology;  Laterality: N/A;     Family History   Problem Relation Age of Onset    Macular degeneration Maternal Uncle     Bladder Cancer Brother         smoker    Cataracts Mother     Fanconi anemia Mother     Leukemia Father 80    Coronary artery disease Father     Heart attack Father 72    Multiple myeloma Sister 59     Social History     Socioeconomic History    Marital status:    Tobacco Use    Smoking status: Never Smoker    Smokeless tobacco: Never Used   Substance and Sexual Activity    Alcohol use: No     Comment: quit 1983    Drug use: Never   Social History Narrative     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      Lost 35 y/o son to drug overdose summer 16     Social Determinants of Health     Financial Resource Strain: Low Risk     Difficulty of Paying Living Expenses: Not hard at all   Food Insecurity: No Food Insecurity    Worried About Running Out of Food in the Last Year: Never true    Ran Out of Food in the Last Year: Never true   Transportation Needs: No Transportation Needs    Lack of Transportation (Medical): No    Lack of Transportation (Non-Medical): No   Physical Activity: Sufficiently Active    Days of Exercise per Week: 5 days    Minutes of Exercise per Session: 50 min   Stress: No Stress Concern Present    Feeling of Stress : Only a little   Social Connections: Unknown    Frequency of Communication with Friends and Family: More than three times a week    Frequency of Social Gatherings with Friends and Family: More than three times a week    Active Member of Clubs or Organizations: No    Attends  Club or Organization Meetings: Never    Marital Status:    Housing Stability: Low Risk     Unable to Pay for Housing in the Last Year: No    Number of Places Lived in the Last Year: 1    Unstable Housing in the Last Year: No         Review of Systems  Cardiovascular: no chest pain  Chest: no shortness of breath  Abd: no abd pain  Remainder review of systems negative    Objective:      Physical Exam   Constitutional: He is oriented to person, place, and time. He appears well-developed and well-nourished.   HENT:   Head: Normocephalic and atraumatic.   Right Ear: External ear normal.   Left Ear: External ear normal.   Nose: Nose normal.   Mouth/Throat: Oropharynx is clear and moist.   Nl tms   Eyes: Conjunctivae and EOM are normal. Pupils are equal, round, and reactive to light. No scleral icterus.   Neck: Normal range of motion. Neck supple. Carotid bruit is not present.   Cardiovascular: Normal rate, regular rhythm and normal heart sounds.  Exam reveals no gallop and no friction rub.    No murmur heard.  Pulmonary/Chest: Effort normal and breath sounds normal. He has no wheezes.   Abdominal: Soft. Bowel sounds are normal. He exhibits no distension and no mass. There is no hepatosplenomegaly. There is no tenderness. There is no rebound and no guarding.   Musculoskeletal: Normal range of motion. He exhibits no tenderness.   Lymphadenopathy:     He has no cervical adenopathy.   Neurological: He is alert and oriented to person, place, and time. No cranial nerve deficit. Coordination normal.   Skin: Skin is warm and dry. No rash noted. No erythema.   Psychiatric: He has a normal mood and affect. His behavior is normal. Judgment and thought content normal.   Nursing note and vitals reviewed.      Assessment:       1. Routine health maintenance    htn  Hx prost ca    Likely benign panc lesion/IPMN  Plan:     Gi f/u one year  urol when due  Lab 12 months and follow up after    1    F/u gi IPMN;msg to ROMMEL Ayon  stat    Routine health maintenance    Primary hypertension  -     Comprehensive Metabolic Panel; Future; Expected date: 06/10/2025  -     Lipid Panel; Future; Expected date: 06/10/2025    IPMN (intraductal papillary mucinous neoplasm)    History of prostate cancer

## 2024-06-14 RX ORDER — PRAVASTATIN SODIUM 40 MG/1
TABLET ORAL
Qty: 90 TABLET | Refills: 4 | Status: SHIPPED | OUTPATIENT
Start: 2024-06-14

## 2024-07-23 RX ORDER — AMLODIPINE BESYLATE 5 MG/1
TABLET ORAL
Qty: 90 TABLET | Refills: 3 | Status: SHIPPED | OUTPATIENT
Start: 2024-07-23

## 2024-07-23 NOTE — TELEPHONE ENCOUNTER
No care due was identified.  [facility] Embedded Care Due Messages. Reference number: [id number].   7/23/2024 12:16:57 PM CDT

## 2024-07-23 NOTE — TELEPHONE ENCOUNTER
Kike Smith  is requesting a refill authorization.  Brief Assessment and Rationale for Refill:  Approve     Medication Therapy Plan:         Comments:     Note composed:1:50 PM 07/23/2024

## 2024-08-08 ENCOUNTER — OFFICE VISIT (OUTPATIENT)
Dept: PRIMARY CARE CLINIC | Facility: CLINIC | Age: 69
End: 2024-08-08
Payer: MEDICARE

## 2024-08-08 DIAGNOSIS — R52 GENERALIZED BODY ACHES: ICD-10-CM

## 2024-08-08 DIAGNOSIS — R53.1 DECREASED STRENGTH, ENDURANCE, AND MOBILITY: ICD-10-CM

## 2024-08-08 DIAGNOSIS — J34.89 RHINORRHEA: ICD-10-CM

## 2024-08-08 DIAGNOSIS — E66.3 OVERWEIGHT: ICD-10-CM

## 2024-08-08 DIAGNOSIS — R05.8 PRODUCTIVE COUGH: ICD-10-CM

## 2024-08-08 DIAGNOSIS — E78.5 HYPERLIPIDEMIA, UNSPECIFIED HYPERLIPIDEMIA TYPE: Chronic | ICD-10-CM

## 2024-08-08 DIAGNOSIS — I10 PRIMARY HYPERTENSION: Chronic | ICD-10-CM

## 2024-08-08 DIAGNOSIS — J34.89 SINUS PRESSURE: ICD-10-CM

## 2024-08-08 DIAGNOSIS — Z74.09 DECREASED STRENGTH, ENDURANCE, AND MOBILITY: ICD-10-CM

## 2024-08-08 DIAGNOSIS — R09.82 POST-NASAL DRIP: ICD-10-CM

## 2024-08-08 DIAGNOSIS — R68.89 DECREASED STRENGTH, ENDURANCE, AND MOBILITY: ICD-10-CM

## 2024-08-08 DIAGNOSIS — U07.1 COVID-19: Primary | ICD-10-CM

## 2024-08-08 PROCEDURE — 99215 OFFICE O/P EST HI 40 MIN: CPT | Mod: 95,,, | Performed by: NURSE PRACTITIONER

## 2024-08-08 PROCEDURE — 3044F HG A1C LEVEL LT 7.0%: CPT | Mod: CPTII,95,, | Performed by: NURSE PRACTITIONER

## 2024-08-08 PROCEDURE — 1159F MED LIST DOCD IN RCRD: CPT | Mod: CPTII,95,, | Performed by: NURSE PRACTITIONER

## 2024-08-08 PROCEDURE — 4010F ACE/ARB THERAPY RXD/TAKEN: CPT | Mod: CPTII,95,, | Performed by: NURSE PRACTITIONER

## 2024-08-08 PROCEDURE — G2211 COMPLEX E/M VISIT ADD ON: HCPCS | Mod: 95,,, | Performed by: NURSE PRACTITIONER

## 2024-08-08 RX ORDER — AZELASTINE 1 MG/ML
1 SPRAY, METERED NASAL 2 TIMES DAILY
Qty: 90 ML | Refills: 0 | Status: SHIPPED | OUTPATIENT
Start: 2024-08-08 | End: 2025-08-08

## 2024-08-08 RX ORDER — NIRMATRELVIR AND RITONAVIR 300-100 MG
KIT ORAL
Qty: 1 TABLET | Refills: 0 | Status: SHIPPED | OUTPATIENT
Start: 2024-08-08

## 2024-08-08 RX ORDER — FLUTICASONE PROPIONATE 50 MCG
1 SPRAY, SUSPENSION (ML) NASAL DAILY
Qty: 16 ML | Refills: 1 | Status: SHIPPED | OUTPATIENT
Start: 2024-08-08

## 2024-08-12 ENCOUNTER — PATIENT MESSAGE (OUTPATIENT)
Dept: UROLOGY | Facility: CLINIC | Age: 69
End: 2024-08-12
Payer: MEDICARE

## 2024-08-15 ENCOUNTER — LAB VISIT (OUTPATIENT)
Dept: LAB | Facility: HOSPITAL | Age: 69
End: 2024-08-15
Attending: UROLOGY
Payer: MEDICARE

## 2024-08-15 DIAGNOSIS — C61 MALIGNANT NEOPLASM OF PROSTATE: ICD-10-CM

## 2024-08-15 LAB — COMPLEXED PSA SERPL-MCNC: <0.01 NG/ML (ref 0–4)

## 2024-08-15 PROCEDURE — 36415 COLL VENOUS BLD VENIPUNCTURE: CPT | Performed by: UROLOGY

## 2024-08-15 PROCEDURE — 84153 ASSAY OF PSA TOTAL: CPT | Performed by: UROLOGY

## 2024-08-15 NOTE — PROGRESS NOTES
Clinic Note  8/15/2024      Subjective:         Chief Complaint:   HPI  Kike Smith is a 68 y.o. male diagnosed with [id number] prostate cancer. Consult from Dr Darling.  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 [employer].  Potent and continent prior to surgery. RALP (robotic assisted laparoscopic prostatectomy) 9/20/2021- path showed 1+ LN. xV0hP2G4, [place] 4+3(GG3).  Reviewed path at last visit. Discussed N1 disease. Discussed observation, ADT +/- EBRT. Patient interested in pursuing surveillance.  Has MICHAELA, using 4-5 minipads/day. Doing kegel exercises 4x10. Symptoms worsened by HCTZ, will discuss with PCP.  Has ED, sees Dr. Chen. Continuing cialis.     FH -negative  PSA - 5.7  Stage - T1C  Volume - 17 ccs TRUS  MRI - n/a  Biopsy - 7/19/2021- Lizette 4+3 left apex 1/2 40%; Highland 3+4 left base 1/2 20%, right apex 2/2 40%, right middle 1/2 30%; Lizette 6 2/2 30%. Overall 7/12 cores positive.  Bone scan-negative  CT scan-negative  MOON Score - 5 intermediate risk  NCCN - Unfavorable intermediate  Germline testing - not indicated     3/18/2022- PSA 0.01  Has been working with PT. Continence improved, only leaks with cough.  Stopped Cialis due to GERD.     8/5/2022- PSA 0.01-6/21/2022. Has been working on hypertension control.     2/3/2023- PSA <0.01.     8/4/2023- PSA <0.01. 2 years out from RALP (robotic assisted laparoscopic prostatectomy) with N1 GG3 disease.  Overall doing well. Had recent normal stress test and colonoscopy.  Continent but still uses security pad.  Son and DIL live in [place]. Work remotely for [email or web address].     2/16/2024-PSA 0.01. Leaves for [place] in 2 weeks.Here today with his wife Martha.      8/15/2024- PSA- <0.01. Doing well 3 years out from RALP (robotic assisted laparoscopic prostatectomy) with N1 disease.  Just back from [place], day 10 Covid.    Lab Results   Component Value Date    PSA 0.01 06/21/2022    PSA 5.5 (H) 06/18/2021    PSA 4.1 (H) 07/21/2020    PSA 3.9  03/19/2019    PSA 3.8 03/02/2018    PSA 3.7 09/20/2017    PSA 3.7 02/20/2017    PSA 2.6 08/23/2016    PSA 3.3 12/29/2015    PSA 2.3 10/01/2014    PSADIAG <0.01 08/15/2024    PSADIAG 0.01 02/15/2024    PSADIAG <0.01 08/03/2023    PSADIAG <0.01 02/02/2023    PSADIAG 0.01 03/14/2022    PSADIAG 0.01 11/02/2021    PSADIAG 5.7 (H) 07/12/2021         Past Medical History:   Diagnosis Date    Allergy     BPH (benign prostatic hyperplasia)     History of colon polyps 01/25/2018    He has had colon polyps since he was in his 30's.  He has had multiple colonoscopies and has had adenomatous polyps.    History of fracture 09/14/2021    History of inguinal hernia repair 09/14/2021    Hyperlipidemia     Hypertension     Inguinal hernia     IPMN (intraductal papillary mucinous neoplasm)     annual eus or mri    Obesity     MERRITT on CPAP     via ent dr wick 2022    Prostate cancer     Squamous cell carcinoma skin of arm      r arm;s/p mohs     Family History   Problem Relation Name Age of Onset    Macular degeneration Maternal Uncle      Bladder Cancer Brother          smoker    Cataracts Mother      Fanconi anemia Mother      Leukemia Father  80    Coronary artery disease Father      Heart attack Father  72    Multiple myeloma Sister  59     Social History     Socioeconomic History    Marital status:    Tobacco Use    Smoking status: Never    Smokeless tobacco: Never   Substance and Sexual Activity    Alcohol use: No     Comment: quit 1983    Drug use: Never    Sexual activity: Yes     Partners: Female   Social History Narrative     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      Lost 35 y/o son to drug overdose summer 16     Social Determinants of Health     Financial Resource Strain: Low Risk  (8/8/2024)    Overall Financial Resource Strain (CARDIA)     Difficulty of Paying Living Expenses: Not hard at all   Food Insecurity: No Food Insecurity (8/8/2024)    Hunger Vital  Sign     Worried About Running Out of Food in the Last Year: Never true     Ran Out of Food in the Last Year: Never true   Transportation Needs: No Transportation Needs (1/9/2022)    PRAPARE - Transportation     Lack of Transportation (Medical): No     Lack of Transportation (Non-Medical): No   Physical Activity: Sufficiently Active (8/8/2024)    Exercise Vital Sign     Days of Exercise per Week: 7 days     Minutes of Exercise per Session: 30 min   Stress: No Stress Concern Present (8/8/2024)    [employer] [place] [employer] - Occupational Stress Questionnaire     Feeling of Stress : Not at all   Housing Stability: Low Risk  (1/9/2022)    Housing Stability Vital Sign     Unable to Pay for Housing in the Last Year: No     Number of Places Lived in the Last Year: 1     Unstable Housing in the Last Year: No     Past Surgical History:   Procedure Laterality Date    CLOSURE OF DEFECT OF MOHS PROCEDURE  07/10/2020    dr reynoso    COLONOSCOPY N/A 1/25/2018    Procedure: COLONOSCOPY;  Surgeon: Juan A Walter MD;  Location: [facility];  Service: Endoscopy;  Laterality: N/A;    COLONOSCOPY N/A 7/12/2023    Procedure: COLONOSCOPY;  Surgeon: Danielle Aguila MD;  Location: [facility];  Service: Endoscopy;  Laterality: N/A;    ENDOSCOPIC ULTRASOUND OF UPPER GASTROINTESTINAL TRACT N/A 1/24/2022    Procedure: ULTRASOUND, ENDOSCOPIC, UPPER GI TRACT;  Surgeon: Daya Espinal MD;  Location: [facility];  Service: Endoscopy;  Laterality: N/A;    KNEE ARTHROSCOPY Left     ORIF TIBIA & FIBULA FRACTURES Left 08/10/2019    tib/fib fx    ROBOT-ASSISTED LAPAROSCOPIC PROSTATECTOMY USING DA ALEX XI N/A 9/20/2021    Procedure: XI ROBOTIC PROSTATECTOMY;  Surgeon: Fidencio Morton MD;  Location: [address];  Service: Urology;  Laterality: N/A;  3hr gen with regional    ROBOT-ASSISTED LAPAROSCOPIC REPAIR OF INGUINAL HERNIA USING DA ALEX XI Left 8/14/2020    Procedure: XI ROBOTIC REPAIR, HERNIA, INGUINAL;  Surgeon:  "Mark Anthony Araujo MD;  Location: [facility] OR;  Service: General;  Laterality: Left;    ROBOT-ASSISTED LYMPHADENECTOMY N/A 9/20/2021    Procedure: ROBOTIC LYMPHADENECTOMY;  Surgeon: Fidencio Morton MD;  Location: [facility] OR [address] [place] [address];  Service: Urology;  Laterality: N/A;     Patient Active Problem List   Diagnosis    Hypertension    Epididymal cyst    Hyperlipidemia    Renal cyst    Malignant neoplasm of prostate    IPMN (intraductal papillary mucinous neoplasm)    Colon cancer screening    Allergies    Overweight    Abnormal EKG    Snoring    Edema    Erectile dysfunction following radical prostatectomy    Decreased strength, endurance, and mobility    Urine incontinence    Pelvic floor weakness, male     Review of Systems   Constitutional:  Negative for appetite change, chills, fatigue, fever and unexpected weight change.   HENT:  Negative for nosebleeds.    Respiratory:  Negative for shortness of breath and wheezing.    Cardiovascular:  Negative for chest pain, palpitations and leg swelling.   Gastrointestinal:  Negative for abdominal distention, abdominal pain, constipation, diarrhea, nausea and vomiting.   Genitourinary:  Negative for dysuria and hematuria.   Musculoskeletal:  Negative for arthralgias and back pain.   Skin:  Negative for pallor.   Neurological:  Negative for dizziness, seizures and syncope.   Hematological:  Negative for adenopathy.   Psychiatric/Behavioral:  Negative for dysphoric mood.        Objective:      There were no vitals taken for this visit.  Estimated body mass index is 31.28 kg/m² as calculated from the following:    Height as of 6/10/24: 5' 10" (1.778 m).    Weight as of 6/10/24: 98.9 kg (218 lb 0.6 oz).  Physical Exam      Assessment and Plan:           Problem List Items Addressed This Visit       Malignant neoplasm of prostate - Primary       Follow up:   Continue follow up for prostate cancer staging.  Recommend 1 year with PSA. Patient more comfortable with 6 months.  Letter to Tha" Clyde.    Fidencio Morton

## 2024-08-16 ENCOUNTER — OFFICE VISIT (OUTPATIENT)
Dept: UROLOGY | Facility: CLINIC | Age: 69
End: 2024-08-16
Payer: MEDICARE

## 2024-08-16 VITALS
SYSTOLIC BLOOD PRESSURE: 108 MMHG | HEIGHT: 70 IN | HEART RATE: 76 BPM | WEIGHT: 213.75 LBS | DIASTOLIC BLOOD PRESSURE: 68 MMHG | RESPIRATION RATE: 16 BRPM | BODY MASS INDEX: 30.6 KG/M2

## 2024-08-16 DIAGNOSIS — C61 MALIGNANT NEOPLASM OF PROSTATE: Primary | ICD-10-CM

## 2024-08-16 PROCEDURE — 99999 PR PBB SHADOW E&M-EST. PATIENT-LVL III: CPT | Mod: PBBFAC,,, | Performed by: UROLOGY

## 2024-08-16 NOTE — LETTER
August 16, 2024        Anmol Reyna MD  [address] [facility]  [place] [address]             0[facility]  [address] [place] [address] [address] [place] [address]  Phone: [phone]  Fax: [phone]   Patient: Kike Smith   MR Number: [id number]   YOB: 1955   Date of Visit: 8/16/2024       Dear Dr. Reyna:    Thank you for referring Kike Smith to me for evaluation. Attached you will find relevant portions of my assessment and plan of care.    If you have questions, please do not hesitate to call me. I look forward to following Kike Smith along with you.    Sincerely,      Fidencio Morton MD            CC    No Recipients    Enclosure

## 2024-08-19 ENCOUNTER — OFFICE VISIT (OUTPATIENT)
Dept: GASTROENTEROLOGY | Facility: CLINIC | Age: 69
End: 2024-08-19
Payer: MEDICARE

## 2024-08-19 ENCOUNTER — PATIENT MESSAGE (OUTPATIENT)
Dept: OTHER | Facility: OTHER | Age: 69
End: 2024-08-19
Payer: MEDICARE

## 2024-08-19 VITALS
BODY MASS INDEX: 30.96 KG/M2 | DIASTOLIC BLOOD PRESSURE: 85 MMHG | HEIGHT: 70 IN | SYSTOLIC BLOOD PRESSURE: 134 MMHG | WEIGHT: 216.25 LBS | HEART RATE: 74 BPM

## 2024-08-19 DIAGNOSIS — D49.0 IPMN (INTRADUCTAL PAPILLARY MUCINOUS NEOPLASM): Primary | ICD-10-CM

## 2024-08-19 PROCEDURE — 1101F PT FALLS ASSESS-DOCD LE1/YR: CPT | Mod: CPTII,S$GLB,, | Performed by: NURSE PRACTITIONER

## 2024-08-19 PROCEDURE — 3044F HG A1C LEVEL LT 7.0%: CPT | Mod: CPTII,S$GLB,, | Performed by: NURSE PRACTITIONER

## 2024-08-19 PROCEDURE — 99213 OFFICE O/P EST LOW 20 MIN: CPT | Mod: S$GLB,,, | Performed by: NURSE PRACTITIONER

## 2024-08-19 PROCEDURE — 1160F RVW MEDS BY RX/DR IN RCRD: CPT | Mod: CPTII,S$GLB,, | Performed by: NURSE PRACTITIONER

## 2024-08-19 PROCEDURE — 3008F BODY MASS INDEX DOCD: CPT | Mod: CPTII,S$GLB,, | Performed by: NURSE PRACTITIONER

## 2024-08-19 PROCEDURE — 1159F MED LIST DOCD IN RCRD: CPT | Mod: CPTII,S$GLB,, | Performed by: NURSE PRACTITIONER

## 2024-08-19 PROCEDURE — 1126F AMNT PAIN NOTED NONE PRSNT: CPT | Mod: CPTII,S$GLB,, | Performed by: NURSE PRACTITIONER

## 2024-08-19 PROCEDURE — 3079F DIAST BP 80-89 MM HG: CPT | Mod: CPTII,S$GLB,, | Performed by: NURSE PRACTITIONER

## 2024-08-19 PROCEDURE — 4010F ACE/ARB THERAPY RXD/TAKEN: CPT | Mod: CPTII,S$GLB,, | Performed by: NURSE PRACTITIONER

## 2024-08-19 PROCEDURE — 3075F SYST BP GE 130 - 139MM HG: CPT | Mod: CPTII,S$GLB,, | Performed by: NURSE PRACTITIONER

## 2024-08-19 PROCEDURE — 99999 PR PBB SHADOW E&M-EST. PATIENT-LVL IV: CPT | Mod: PBBFAC,,, | Performed by: NURSE PRACTITIONER

## 2024-08-19 PROCEDURE — 3288F FALL RISK ASSESSMENT DOCD: CPT | Mod: CPTII,S$GLB,, | Performed by: NURSE PRACTITIONER

## 2024-08-19 NOTE — PROGRESS NOTES
Clinic Follow Up:  [facility] Follow Up Note    Reason for Follow Up:  The encounter diagnosis was IPMN (intraductal papillary mucinous neoplasm).    PCP: Anmol Reyna       HPI:  This is a 68 y.o. male here for follow up.   He is doing well currently. He is here for follow up of pancreatic cyst.   EUS (2022) hyperplastic gastric nodule. 3 mm cyst in the body of the pancreas.   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     Review of Systems   Constitutional:  Negative for activity change and appetite change.        As per interval history above   Respiratory:  Negative for cough and shortness of breath.    Cardiovascular:  Negative for chest pain.   Gastrointestinal:  Positive for abdominal pain. Negative for blood in stool, constipation, diarrhea, nausea and vomiting.   Skin:  Negative for color change and rash.       Medical History:  Past Medical History:   Diagnosis Date    Allergy     BPH (benign prostatic hyperplasia)     History of colon polyps 01/25/2018    He has had colon polyps since he was in his 30's.  He has had multiple colonoscopies and has had adenomatous polyps.    History of fracture 09/14/2021    History of inguinal hernia repair 09/14/2021    Hyperlipidemia     Hypertension     Inguinal hernia     IPMN (intraductal papillary mucinous neoplasm)     annual eus or mri    Obesity     MERRITT on CPAP     via ent dr wick 2022    Prostate cancer     Squamous cell carcinoma skin of arm      r arm;s/p mohs       Surgical History:   Past Surgical History:   Procedure Laterality Date    CLOSURE OF DEFECT OF MOHS PROCEDURE  07/10/2020    dr reynoso    COLONOSCOPY N/A 1/25/2018    Procedure: COLONOSCOPY;  Surgeon: Juan A Walter MD;  Location: [facility];  Service: Endoscopy;  Laterality: N/A;    COLONOSCOPY N/A 7/12/2023    Procedure: COLONOSCOPY;  Surgeon: Danielle Aguila MD;  Location: [facility] ENDO;  Service: Endoscopy;  Laterality: N/A;    ENDOSCOPIC ULTRASOUND OF UPPER  GASTROINTESTINAL TRACT N/A 1/24/2022    Procedure: ULTRASOUND, ENDOSCOPIC, UPPER GI TRACT;  Surgeon: Daya Espinal MD;  Location: [facility];  Service: Endoscopy;  Laterality: N/A;    KNEE ARTHROSCOPY Left     ORIF TIBIA & FIBULA FRACTURES Left 08/10/2019    tib/fib fx    ROBOT-ASSISTED LAPAROSCOPIC PROSTATECTOMY USING DA ALEX XI N/A 9/20/2021    Procedure: XI ROBOTIC PROSTATECTOMY;  Surgeon: Fidencio Morton MD;  Location: [facility] OR 2ND FLR;  Service: Urology;  Laterality: N/A;  3hr gen with regional    ROBOT-ASSISTED LAPAROSCOPIC REPAIR OF INGUINAL HERNIA USING DA ALEX XI Left 8/14/2020    Procedure: XI ROBOTIC REPAIR, HERNIA, INGUINAL;  Surgeon: Mark Anthony Araujo MD;  Location: [facility];  Service: General;  Laterality: Left;    ROBOT-ASSISTED LYMPHADENECTOMY N/A 9/20/2021    Procedure: ROBOTIC LYMPHADENECTOMY;  Surgeon: Fidencio Morton MD;  [facility];  Service: Urology;  Laterality: N/A;       Family History:   Family History   Problem Relation Name Age of Onset    Macular degeneration Maternal Uncle      Bladder Cancer Brother          smoker    Cataracts Mother      Fanconi anemia Mother      Leukemia Father  80    Coronary artery disease Father      Heart attack Father  72    Multiple myeloma Sister  59       Social History:   Social History     Tobacco Use    Smoking status: Never    Smokeless tobacco: Never   Substance Use Topics    Alcohol use: No     Comment: quit 1983    Drug use: Never       Allergies: Review of patient's allergies indicates:  No Known Allergies    Home Medications:  Current Outpatient Medications on File Prior to Visit   Medication Sig Dispense Refill    amLODIPine (NORVASC) 5 MG tablet TAKE 1 TABLET(5 MG) BY MOUTH EVERY DAY 90 tablet 3    azelastine (ASTELIN) 137 mcg (0.1 %) nasal spray 1 spray (137 mcg total) by Nasal route 2 (two) times daily. 90 mL 0    benzonatate (TESSALON) 200 MG capsule Take 200 mg by mouth 3 (three) times daily.       "ciprofloxacin-dexamethasone 0.3-0.1% (CIPRODEX) 0.3-0.1 % DrpS Prn as needed for ear      fluticasone propionate (FLONASE) 50 mcg/actuation nasal spray 1 spray (50 mcg total) by Each Nostril route once daily. 16 mL 1    hydroCHLOROthiazide (HYDRODIURIL) 12.5 MG Tab TAKE 1 TABLET(12.5 MG) BY MOUTH EVERY DAY 90 tablet 3    Lactobacillus acidophilus (PROBIOTIC) 10 billion cell Cap Take 1 tablet by mouth once daily.       pravastatin (PRAVACHOL) 40 MG tablet TAKE 1 TABLET BY MOUTH EVERY DAY 90 tablet 4    pravastatin (PRAVACHOL) 40 MG tablet Take 1 tablet (40 mg total) by mouth once daily. 90 tablet 4    valsartan (DIOVAN) 160 MG tablet Take 1 tablet (160 mg total) by mouth once daily. 90 tablet 1    nirmatrelvir-ritonavir (PAXLOVID) 300 mg (150 mg x 2)-100 mg copackaged tablets (EUA) Take 3 tablets by mouth 2 (two) times daily. Each dose contains 2 nirmatrelvir (pink tablets) and 1 ritonavir (white tablet). Take all 3 tablets together (Patient not taking: Reported on 8/19/2024) 1 tablet 0     Current Facility-Administered Medications on File Prior to Visit   Medication Dose Route Frequency Provider Last Rate Last Admin    lactated ringers infusion   Intravenous Continuous Danielle Aguila  mL/hr at 07/12/23 0915 New Bag at 07/12/23 0915       /85 (BP Location: Left arm, Patient Position: Sitting, BP Method: Large (Automatic))   Pulse 74   Ht 5' 10" (1.778 m)   Wt 98.1 kg (216 lb 4.3 oz)   BMI 31.03 kg/m²   Body mass index is 31.03 kg/m².  Physical Exam  Constitutional:       General: He is not in acute distress.  HENT:      Head: Normocephalic.   Neurological:      General: No focal deficit present.      Mental Status: He is alert.   Psychiatric:         Mood and Affect: Mood normal.         Judgment: Judgment normal.         Labs: Pertinent labs reviewed.  CRC Screening: up to date.     Assessment:   1. IPMN (intraductal papillary mucinous neoplasm)        Recommendations:   - pancreatic cyst seen on " EUS in 2022  - appearance to be lipoma in scan in 2023  - will get MRI    IPMN (intraductal papillary mucinous neoplasm)  -     MRI MRCP Abdomen W WO Contrast 3D WO Independent WS XPD; Future; Expected date: 08/19/2024    Return to Clinic:  Follow up in about 1 year (around 8/19/2025).    Thank you for the opportunity to participate in the care of this patient.  ADOLFO Coats

## 2024-09-05 DIAGNOSIS — D49.0 IPMN (INTRADUCTAL PAPILLARY MUCINOUS NEOPLASM): Primary | ICD-10-CM

## 2024-09-13 ENCOUNTER — HOSPITAL ENCOUNTER (OUTPATIENT)
Dept: RADIOLOGY | Facility: HOSPITAL | Age: 69
Discharge: HOME | End: 2024-09-13
Attending: NURSE PRACTITIONER
Payer: MEDICARE

## 2024-09-13 ENCOUNTER — PATIENT MESSAGE (OUTPATIENT)
Dept: GASTROENTEROLOGY | Facility: CLINIC | Age: 69
End: 2024-09-13
Payer: MEDICARE

## 2024-09-13 DIAGNOSIS — D49.0 IPMN (INTRADUCTAL PAPILLARY MUCINOUS NEOPLASM): ICD-10-CM

## 2024-09-13 PROCEDURE — 76376 3D RENDER W/INTRP POSTPROCES: CPT | Mod: TC

## 2024-09-13 PROCEDURE — A9585 GADOBUTROL INJECTION: HCPCS | Performed by: NURSE PRACTITIONER

## 2024-09-13 PROCEDURE — 25500020 PHARM REV CODE 255: Performed by: NURSE PRACTITIONER

## 2024-09-13 PROCEDURE — 74183 MRI ABD W/O CNTR FLWD CNTR: CPT | Mod: TC

## 2024-09-13 RX ORDER — GADOBUTROL 604.72 MG/ML
10 INJECTION INTRAVENOUS
Status: COMPLETED | OUTPATIENT
Start: 2024-09-13 | End: 2024-09-13

## 2024-09-13 RX ADMIN — GADOBUTROL 10 ML: 604.72 INJECTION INTRAVENOUS at 08:09

## 2024-09-18 DIAGNOSIS — I10 PRIMARY HYPERTENSION: ICD-10-CM

## 2024-09-18 NOTE — TELEPHONE ENCOUNTER
No care due was identified.  [facility] Embedded Care Due Messages. Reference number: [id number].   9/18/2024 2:36:13 PM CDT

## 2024-09-19 RX ORDER — HYDROCHLOROTHIAZIDE 12.5 MG/1
12.5 TABLET ORAL DAILY
Qty: 90 TABLET | Refills: 2 | Status: SHIPPED | OUTPATIENT
Start: 2024-09-19

## 2024-09-19 NOTE — TELEPHONE ENCOUNTER
Refill Decision Note   Kike Smith  is requesting a refill authorization.  Brief Assessment and Rationale for Refill:  Approve     Medication Therapy Plan:         Comments:     Note composed:11:35 AM 09/19/2024

## 2024-10-09 ENCOUNTER — PATIENT MESSAGE (OUTPATIENT)
Dept: OTHER | Facility: OTHER | Age: 69
End: 2024-10-09
Payer: MEDICARE

## 2024-10-17 ENCOUNTER — PATIENT MESSAGE (OUTPATIENT)
Dept: ADMINISTRATIVE | Facility: OTHER | Age: 69
End: 2024-10-17
Payer: MEDICARE

## 2024-10-20 ENCOUNTER — PATIENT MESSAGE (OUTPATIENT)
Dept: OTHER | Facility: OTHER | Age: 69
End: 2024-10-20
Payer: MEDICARE

## 2024-10-21 ENCOUNTER — OFFICE VISIT (OUTPATIENT)
Dept: DERMATOLOGY | Facility: CLINIC | Age: 69
End: 2024-10-21
Payer: MEDICARE

## 2024-10-21 DIAGNOSIS — L90.5 SCAR CONDITIONS/SKIN FIBROSIS: Primary | ICD-10-CM

## 2024-10-21 DIAGNOSIS — Z12.83 SCREENING, MALIGNANT NEOPLASM, SKIN: ICD-10-CM

## 2024-10-21 DIAGNOSIS — L82.1 SEBORRHEIC KERATOSIS: ICD-10-CM

## 2024-10-21 DIAGNOSIS — Z85.828 HISTORY OF SKIN CANCER: ICD-10-CM

## 2024-10-21 DIAGNOSIS — L73.9 FOLLICULITIS: ICD-10-CM

## 2024-10-21 DIAGNOSIS — L57.0 ACTINIC KERATOSES: ICD-10-CM

## 2024-10-21 PROCEDURE — 17003 DESTRUCT PREMALG LES 2-14: CPT | Mod: S$GLB,,, | Performed by: DERMATOLOGY

## 2024-10-21 PROCEDURE — 99999 PR PBB SHADOW E&M-EST. PATIENT-LVL III: CPT | Mod: PBBFAC,,, | Performed by: DERMATOLOGY

## 2024-10-21 PROCEDURE — 3044F HG A1C LEVEL LT 7.0%: CPT | Mod: CPTII,S$GLB,, | Performed by: DERMATOLOGY

## 2024-10-21 PROCEDURE — 1160F RVW MEDS BY RX/DR IN RCRD: CPT | Mod: CPTII,S$GLB,, | Performed by: DERMATOLOGY

## 2024-10-21 PROCEDURE — 3288F FALL RISK ASSESSMENT DOCD: CPT | Mod: CPTII,S$GLB,, | Performed by: DERMATOLOGY

## 2024-10-21 PROCEDURE — 1126F AMNT PAIN NOTED NONE PRSNT: CPT | Mod: CPTII,S$GLB,, | Performed by: DERMATOLOGY

## 2024-10-21 PROCEDURE — 1101F PT FALLS ASSESS-DOCD LE1/YR: CPT | Mod: CPTII,S$GLB,, | Performed by: DERMATOLOGY

## 2024-10-21 PROCEDURE — 1159F MED LIST DOCD IN RCRD: CPT | Mod: CPTII,S$GLB,, | Performed by: DERMATOLOGY

## 2024-10-21 PROCEDURE — 4010F ACE/ARB THERAPY RXD/TAKEN: CPT | Mod: CPTII,S$GLB,, | Performed by: DERMATOLOGY

## 2024-10-21 PROCEDURE — 17000 DESTRUCT PREMALG LESION: CPT | Mod: S$GLB,,, | Performed by: DERMATOLOGY

## 2024-10-21 PROCEDURE — 99214 OFFICE O/P EST MOD 30 MIN: CPT | Mod: 25,S$GLB,, | Performed by: DERMATOLOGY

## 2024-10-21 RX ORDER — CLINDAMYCIN PHOSPHATE 11.9 MG/ML
SOLUTION TOPICAL
Qty: 60 ML | Refills: 11 | Status: SHIPPED | OUTPATIENT
Start: 2024-10-21

## 2024-10-21 NOTE — PATIENT INSTRUCTIONS
CRYOSURGERY      Your doctor has used a method called cryosurgery to treat your skin condition. Cryosurgery refers to the use of very cold substances to treat a variety of skin conditions such as warts, pre-skin cancers, molluscum contagiosum, sun spots, and several benign growths. The substance we use in cryosurgery is liquid nitrogen and is so cold (-195 degrees Celsius) that is burns when administered.     Following treatment in the office, the skin may immediately burn and become red. You may find the area around the lesion is affected as well. It is sometimes necessary to treat not only the lesion, but a small area of the surrounding normal skin to achieve a good response.     A blister, and even a blood filled blister, may form after treatment.   This is a normal response. If the blister is painful, it is acceptable to sterilize a needle and with rubbing alcohol and gently pop the blister. It is important that you gently wash the area with soap and warm water as the blister fluid may contain wart virus if a wart was treated. Do no remove the roof of the blister.     The area treated can take anywhere from 1-3 weeks to heal. Healing time depends on the kind of skin lesion treated, the location, and how aggressively the lesion was treated. It is recommended that the areas treated are covered with Vaseline or bacitracin ointment and a band-aid. If a band-aid is not practical, just ointment applied several times per day will do. Keeping these areas moist will speed the healing time.    Treatment with liquid nitrogen can leave a scar. In dark skin, it may be a light or dark scar, in light skin it may be a white or pink scar. These will generally fade with time.    If you have any concerns after your treatment, please feel free to call the office.         [facility] [place][note 3] - DERMATOLOGY  [address]  [facility] [address]  Dept: [phone]  Dept Fax: [phone]

## 2024-10-21 NOTE — PROGRESS NOTES
Subjective:       Patient ID:  Kike Smith is a 69 y.o. male who presents for   Chief Complaint   Patient presents with    Skin Check     [facility]. Pt concern of the skin lesion located on the lower left leg X several years s/s raised Tx none      Hx of SCC of the right forearm (s/p Mohs by Dr. 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  He c/o several crusted areas on the legs.      This is a high risk patient here to check for the development of new lesions. Denies bleeding, tender, growing, or concerning lesions.       Review of Systems   Constitutional:  Negative for fever and chills.   Gastrointestinal:  Negative for nausea and vomiting.   Skin:  Positive for activity-related sunscreen use. Negative for daily sunscreen use and recent sunburn.   Hematologic/Lymphatic: Does not bruise/bleed easily.        Objective:    Physical Exam   Constitutional: He appears well-developed and well-nourished. No distress.   Neurological: He is alert and oriented to person, place, and time. He is not disoriented.   Psychiatric: He has a normal mood and affect.   Skin:   Areas Examined (abnormalities noted in diagram):   Scalp / Hair Palpated and Inspected  Head / Face Inspection Performed  Neck Inspection Performed  Chest / Axilla Inspection Performed  Abdomen Inspection Performed  Back Inspection Performed  RUE Inspected  LUE Inspection Performed  RLE Inspected  LLE Inspection Performed  Nails and Digits Inspection Performed                   Diagram Legend     Erythematous scaling macule/papule c/w actinic keratosis       Vascular papule c/w angioma      Pigmented verrucoid papule/plaque c/w seborrheic keratosis      Yellow umbilicated papule c/w sebaceous hyperplasia      Irregularly shaped tan macule c/w lentigo     1-2 mm smooth white papules consistent with Milia      Movable subcutaneous cyst with punctum c/w  epidermal inclusion cyst      Subcutaneous movable cyst c/w pilar cyst      Firm pink to brown papule c/w dermatofibroma      Pedunculated fleshy papule(s) c/w skin tag(s)      Evenly pigmented macule c/w junctional nevus     Mildly variegated pigmented, slightly irregular-bordered macule c/w mildly atypical nevus      Flesh colored to evenly pigmented papule c/w intradermal nevus       Pink pearly papule/plaque c/w basal cell carcinoma      Erythematous hyperkeratotic cursted plaque c/w SCC      Surgical scar with no sign of skin cancer recurrence      Open and closed comedones      Inflammatory papules and pustules      Verrucoid papule consistent consistent with wart     Erythematous eczematous patches and plaques     Dystrophic onycholytic nail with subungual debris c/w onychomycosis     Umbilicated papule    Erythematous-base heme-crusted tan verrucoid plaque consistent with inflamed seborrheic keratosis     Erythematous Silvery Scaling Plaque c/w Psoriasis     See annotation    Assessment / Plan:        Scar conditions/skin fibrosis  Screening, malignant neoplasm, skin  History of skin cancer  Scar of the right forearm, hx of NMSC.  No evidence of recurrence on physical exam today.  Continue routine skin surveillance. Daily sunscreen advised.    Area(s) of previous NMSC evaluated with no signs of recurrence.    full body skin examination performed today including at least 12 points as noted in physical examination. No lesions suspicious for malignancy noted.    Seborrheic keratosis  Reassurance given. Discussed diagnosis and that lesions are benign.  AAD handout given.     Folliculitis  -     clindamycin (CLEOCIN T) 1 % external solution; AAA of scalp 1-2 times daily prn hair bumps  Dispense: 60 mL; Refill: 11  -     of the scalp, will start above med.     Seborrheic keratosis  Reassurance given. Discussed diagnosis and that lesions are benign.  AAD handout given.     Actinic keratoses  Cryosurgery Procedure  Note    The patient is informed of the precancerous quality and need for treatment of these lesions. After risks, benefits and alternatives explained, including blistering, pain, hyper- and hypopigmentation, patient verbally consents to cryotherapy to precancerous lesions. Liquid nitrogen cryosurgery is applied to the 2 actinic keratoses, as detailed in the physical exam, to produce a freeze injury. The patient is aware that blisters may form and is instructed on wound care with gentle cleansing and use of vaseline ointment to keep moist until healed. The patient is supplied a handout on cryosurgery and is instructed to call if lesions do not completely resolve.             Follow up in about 6 months (around 4/21/2025).

## 2025-01-07 ENCOUNTER — PATIENT MESSAGE (OUTPATIENT)
Dept: OTHER | Facility: OTHER | Age: 70
End: 2025-01-07
Payer: MEDICARE

## 2025-02-06 ENCOUNTER — LAB VISIT (OUTPATIENT)
Dept: LAB | Facility: HOSPITAL | Age: 70
End: 2025-02-06
Attending: UROLOGY
Payer: MEDICARE

## 2025-02-06 DIAGNOSIS — C61 MALIGNANT NEOPLASM OF PROSTATE: ICD-10-CM

## 2025-02-06 LAB — COMPLEXED PSA SERPL-MCNC: <0.01 NG/ML (ref 0–4)

## 2025-02-06 PROCEDURE — 36415 COLL VENOUS BLD VENIPUNCTURE: CPT | Performed by: UROLOGY

## 2025-02-06 PROCEDURE — 84153 ASSAY OF PSA TOTAL: CPT | Performed by: UROLOGY

## 2025-02-06 NOTE — PROGRESS NOTES
Clinic Note  2/6/2025      Subjective:         Chief Complaint:   HPI  Kike Smith is a 69 y.o. male with [id number] prostate cancer. Consult from Dr Darling.  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  Potent and continent prior to surgery. RALP (robotic assisted laparoscopic prostatectomy) 9/20/2021- path showed 1+ LN. [id number], Lizette 4+3(GG3).  Reviewed path at last visit. Discussed N1 disease. Discussed observation, ADT +/- EBRT. Patient interested in pursuing surveillance.  Has MICHAELA, using 4-5 minipads/day. Doing kegel exercises 4x10. Symptoms worsened by HCTZ, will discuss with PCP.  Has ED, sees Dr. Chen. Continuing cialis.     FH -negative  PSA - 5.7  Stage - T1C  Volume - 17 ccs TRUS  MRI - n/a  Biopsy - 7/19/2021- Lizette 4+3 left apex 1/2 40%; Lizette 3+4 left base 1/2 20%, right apex 2/2 40%, right middle 1/2 30%; Lizette 6 2/2 30%. Overall 7/12 cores positive.  Bone scan-negative  CT scan-negative  MOON Score - 5 intermediate risk  NCCN - Unfavorable intermediate  Germline testing - not indicated     3/18/2022- PSA 0.01  Has been working with PT. Continence improved, only leaks with cough.  Stopped Cialis due to GERD.     8/5/2022- PSA 0.01-6/21/2022. Has been working on hypertension control.     2/3/2023- PSA <0.01.     8/4/2023- PSA <0.01. 2 years out from RALP (robotic assisted laparoscopic prostatectomy) with N1 GG3 disease.  Overall doing well. Had recent normal stress test and colonoscopy.  Continent but still uses security pad.  Son and DIL live in [place]. Work remotely for [employer].     2/16/2024-PSA 0.01. Leaves for [place] in 2 weeks.Here today with his wife Martha.      8/15/2024- PSA- <0.01. Doing well 3 years out from RALP (robotic assisted laparoscopic prostatectomy) with N1 disease.  Just back from [place], day 10 Covid.    2/7/2025- PSA-<0.01.    Lab Results   Component Value Date    PSA 0.01 06/21/2022    PSA 5.5 (H) 06/18/2021    PSA 4.1 (H) 07/21/2020     PSA 3.9 03/19/2019    PSA 3.8 03/02/2018    PSA 3.7 09/20/2017    PSA 3.7 02/20/2017    PSA 2.6 08/23/2016    PSA 3.3 12/29/2015    PSA 2.3 10/01/2014    PSADIAG <0.01 02/06/2025    PSADIAG <0.01 08/15/2024    PSADIAG 0.01 02/15/2024    PSADIAG <0.01 08/03/2023    PSADIAG <0.01 02/02/2023    PSADIAG 0.01 03/14/2022    PSADIAG 0.01 11/02/2021    PSADIAG 5.7 (H) 07/12/2021           Past Medical History:   Diagnosis Date    Allergy     BPH (benign prostatic hyperplasia)     History of colon polyps 01/25/2018    He has had colon polyps since he was in his 30's.  He has had multiple colonoscopies and has had adenomatous polyps.    History of fracture 09/14/2021    History of inguinal hernia repair 09/14/2021    Hyperlipidemia     Hypertension     Inguinal hernia     IPMN (intraductal papillary mucinous neoplasm)     annual eus or mri    Obesity     MERRITT on CPAP     via ent dr wick 2022    Prostate cancer     Squamous cell carcinoma skin of arm      r arm;s/p mohs     Family History   Problem Relation Name Age of Onset    Macular degeneration Maternal Uncle      Bladder Cancer Brother          smoker    Cataracts Mother      Fanconi anemia Mother      Leukemia Father  80    Coronary artery disease Father      Heart attack Father  72    Multiple myeloma Sister  59     Social History     Socioeconomic History    Marital status:    Tobacco Use    Smoking status: Never    Smokeless tobacco: Never   Substance and Sexual Activity    Alcohol use: No     Comment: quit 1983    Drug use: Never    Sexual activity: Yes     Partners: Female   Social History Narrative     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 16     Social Drivers of Health     Financial Resource Strain: Low Risk  (8/8/2024)    Overall Financial Resource Strain (CARDIA)     Difficulty of Paying Living Expenses: Not hard at all   Food Insecurity: No Food  Insecurity (8/8/2024)    Hunger Vital Sign     Worried About Running Out of Food in the Last Year: Never true     Ran Out of Food in the Last Year: Never true   Transportation Needs: No Transportation Needs (1/9/2022)    PRAPARE - Transportation     Lack of Transportation (Medical): No     Lack of Transportation (Non-Medical): No   Physical Activity: Sufficiently Active (8/8/2024)    Exercise Vital Sign     Days of Exercise per Week: 7 days     Minutes of Exercise per Session: 30 min   Stress: No Stress Concern Present (8/8/2024)    [employer] - Occupational Stress Questionnaire     Feeling of Stress : Not at all   Housing Stability: Low Risk  (1/9/2022)    Housing Stability Vital Sign     Unable to Pay for Housing in the Last Year: No     Number of Places Lived in the Last Year: 1     Unstable Housing in the Last Year: No     Past Surgical History:   Procedure Laterality Date    CLOSURE OF DEFECT OF MOHS PROCEDURE  07/10/2020    dr reynoso    COLONOSCOPY N/A 1/25/2018    Procedure: COLONOSCOPY;  Surgeon: Juan A Walter MD;  Location: [facility];  Service: Endoscopy;  Laterality: N/A;    COLONOSCOPY N/A 7/12/2023    Procedure: COLONOSCOPY;  Surgeon: Danielle Aguila MD;  Location: [facility];  Service: Endoscopy;  Laterality: N/A;    ENDOSCOPIC ULTRASOUND OF UPPER GASTROINTESTINAL TRACT N/A 1/24/2022    Procedure: ULTRASOUND, ENDOSCOPIC, UPPER GI TRACT;  Surgeon: Daya Espinal MD;  Location: [facility];  Service: Endoscopy;  Laterality: N/A;    KNEE ARTHROSCOPY Left     ORIF TIBIA & FIBULA FRACTURES Left 08/10/2019    tib/fib fx    ROBOT-ASSISTED LAPAROSCOPIC PROSTATECTOMY USING DA ALEX XI N/A 9/20/2021    Procedure: XI ROBOTIC PROSTATECTOMY;  Surgeon: Fidencio Morton MD;  Location: [facility] OR [address] [place] [address];  Service: Urology;  Laterality: N/A;  3hr gen with regional    ROBOT-ASSISTED LAPAROSCOPIC REPAIR OF INGUINAL HERNIA USING DA ALEX XI Left 8/14/2020    Procedure: XI ROBOTIC  "REPAIR, HERNIA, INGUINAL;  Surgeon: Mark Anthony Araujo MD;  Location: [facility] OR;  Service: General;  Laterality: Left;    ROBOT-ASSISTED LYMPHADENECTOMY N/A 9/20/2021    Procedure: ROBOTIC LYMPHADENECTOMY;  Surgeon: Fidencio Morton MD;  Location: [facility] OR [address] [place] [address];  Service: Urology;  Laterality: N/A;     Patient Active Problem List   Diagnosis    Hypertension    Epididymal cyst    Hyperlipidemia    Renal cyst    Malignant neoplasm of prostate    IPMN (intraductal papillary mucinous neoplasm)    Colon cancer screening    Allergies    Overweight    Abnormal EKG    Snoring    Edema    Erectile dysfunction following radical prostatectomy    Decreased strength, endurance, and mobility    Urine incontinence    Pelvic floor weakness, male     Review of Systems   Constitutional:  Negative for appetite change, chills, fatigue, fever and unexpected weight change.   HENT:  Negative for nosebleeds.    Respiratory:  Negative for shortness of breath and wheezing.    Cardiovascular:  Negative for chest pain, palpitations and leg swelling.   Gastrointestinal:  Negative for abdominal distention, abdominal pain, constipation, diarrhea, nausea and vomiting.   Genitourinary:  Negative for dysuria and hematuria.   Musculoskeletal:  Positive for arthralgias. Negative for back pain.   Skin:  Negative for pallor.   Neurological:  Negative for dizziness, seizures and syncope.   Hematological:  Negative for adenopathy.   Psychiatric/Behavioral:  Negative for dysphoric mood.          Objective:      There were no vitals taken for this visit.  Estimated body mass index is 31.03 kg/m² as calculated from the following:    Height as of 8/19/24: 5' 10" (1.778 m).    Weight as of 8/19/24: 98.1 kg (216 lb 4.3 oz).  Physical Exam      Assessment and Plan:   PSA in 6 months  Visit and PSA in one year.   code applied: patient requires or will require a continuous, longitudinal, and active collaborative plan of care related to this patient's health " condition, the management of which requires the direction of a practitioner with specialized clinical knowledge, skill, and expertise.        Problem List Items Addressed This Visit       Malignant neoplasm of prostate - Primary       Follow up:       Fidencio Morton

## 2025-02-07 ENCOUNTER — OFFICE VISIT (OUTPATIENT)
Dept: UROLOGY | Facility: CLINIC | Age: 70
End: 2025-02-07
Payer: MEDICARE

## 2025-02-07 VITALS — HEIGHT: 70 IN | BODY MASS INDEX: 31.03 KG/M2

## 2025-02-07 DIAGNOSIS — C61 MALIGNANT NEOPLASM OF PROSTATE: Primary | ICD-10-CM

## 2025-02-07 PROCEDURE — 3008F BODY MASS INDEX DOCD: CPT | Mod: CPTII,S$GLB,, | Performed by: UROLOGY

## 2025-02-07 PROCEDURE — 3288F FALL RISK ASSESSMENT DOCD: CPT | Mod: CPTII,S$GLB,, | Performed by: UROLOGY

## 2025-02-07 PROCEDURE — 1160F RVW MEDS BY RX/DR IN RCRD: CPT | Mod: CPTII,S$GLB,, | Performed by: UROLOGY

## 2025-02-07 PROCEDURE — 99214 OFFICE O/P EST MOD 30 MIN: CPT | Mod: S$GLB,,, | Performed by: UROLOGY

## 2025-02-07 PROCEDURE — 1126F AMNT PAIN NOTED NONE PRSNT: CPT | Mod: CPTII,S$GLB,, | Performed by: UROLOGY

## 2025-02-07 PROCEDURE — 4010F ACE/ARB THERAPY RXD/TAKEN: CPT | Mod: CPTII,S$GLB,, | Performed by: UROLOGY

## 2025-02-07 PROCEDURE — 1101F PT FALLS ASSESS-DOCD LE1/YR: CPT | Mod: CPTII,S$GLB,, | Performed by: UROLOGY

## 2025-02-07 PROCEDURE — 99999 PR PBB SHADOW E&M-EST. PATIENT-LVL III: CPT | Mod: PBBFAC,,, | Performed by: UROLOGY

## 2025-02-07 PROCEDURE — G2211 COMPLEX E/M VISIT ADD ON: HCPCS | Mod: S$GLB,,, | Performed by: UROLOGY

## 2025-02-07 PROCEDURE — 1159F MED LIST DOCD IN RCRD: CPT | Mod: CPTII,S$GLB,, | Performed by: UROLOGY

## 2025-02-25 ENCOUNTER — TELEPHONE (OUTPATIENT)
Dept: INTERNAL MEDICINE | Facility: CLINIC | Age: 70
End: 2025-02-25
Payer: MEDICARE

## 2025-02-25 ENCOUNTER — OFFICE VISIT (OUTPATIENT)
Dept: URGENT CARE | Facility: CLINIC | Age: 70
End: 2025-02-25
Payer: MEDICARE

## 2025-02-25 VITALS
TEMPERATURE: 98 F | BODY MASS INDEX: 31.16 KG/M2 | OXYGEN SATURATION: 99 % | SYSTOLIC BLOOD PRESSURE: 152 MMHG | HEART RATE: 67 BPM | HEIGHT: 70 IN | DIASTOLIC BLOOD PRESSURE: 77 MMHG | WEIGHT: 217.69 LBS | RESPIRATION RATE: 20 BRPM

## 2025-02-25 DIAGNOSIS — R30.0 DYSURIA: Primary | ICD-10-CM

## 2025-02-25 LAB
BILIRUBIN, UA POC OHS: NEGATIVE
BLOOD, UA POC OHS: ABNORMAL
CLARITY, UA POC OHS: CLEAR
COLOR, UA POC OHS: YELLOW
GLUCOSE, UA POC OHS: NEGATIVE
KETONES, UA POC OHS: NEGATIVE
LEUKOCYTES, UA POC OHS: NEGATIVE
NITRITE, UA POC OHS: NEGATIVE
PH, UA POC OHS: 7
PROTEIN, UA POC OHS: ABNORMAL
SPECIFIC GRAVITY, UA POC OHS: 1.02
UROBILINOGEN, UA POC OHS: 0.2

## 2025-02-25 PROCEDURE — 87086 URINE CULTURE/COLONY COUNT: CPT | Performed by: NURSE PRACTITIONER

## 2025-02-25 PROCEDURE — 81003 URINALYSIS AUTO W/O SCOPE: CPT | Mod: QW,S$GLB,, | Performed by: NURSE PRACTITIONER

## 2025-02-25 PROCEDURE — 99213 OFFICE O/P EST LOW 20 MIN: CPT | Mod: S$GLB,,, | Performed by: NURSE PRACTITIONER

## 2025-02-25 NOTE — PROGRESS NOTES
"Subjective:      Patient ID: Kike Smith is a 69 y.o. male.    Vitals:  height is 5' 10" (1.778 m) and weight is 98.8 kg (217 lb 11.3 oz). His oral temperature is 98.2 °F (36.8 °C). His blood pressure is 152/77 (abnormal) and his pulse is 67. His respiration is 20 and oxygen saturation is 99%.     Chief Complaint: Dysuria    Patient presents with Spasms in Penis when PT urinates .  Symptoms started  1 day ago \  Pt stated he had prostate cancer about 2 years ago. Denies frequency, burning. Admits symptoms started after being sexually active over the weekend. Denies penile discharge.     Dysuria   This is a new problem. The current episode started yesterday. The problem has been gradually worsening. The pain is at a severity of 3/10. The pain is mild. There has been no fever. He is Not sexually active. There is No history of pyelonephritis. Associated symptoms include frequency and urgency. Pertinent negatives include no behavior changes, chills, discharge, flank pain, hematuria, hesitancy, nausea, possible pregnancy, sweats, vomiting, weight loss, bubble bath use, constipation, rash or withholding. He has tried nothing for the symptoms. The treatment provided mild relief. There is no history of catheterization, diabetes insipidus, diabetes mellitus, genitourinary reflux, hypertension, kidney stones, recurrent UTIs, a single kidney, STD, urinary stasis or a urological procedure.       Constitution: Negative for chills.   Gastrointestinal:  Negative for nausea, vomiting and constipation.   Genitourinary:  Positive for dysuria, frequency and urgency. Negative for flank pain and hematuria.   Skin:  Negative for rash.      Objective:     Physical Exam    Assessment:     1. Dysuria        Plan:       Dysuria  -     POCT Urinalysis(Instrument)  -     Urine Culture High Risk      Small amount of blood and trace protein on urinalysis. Negative WBS and nitrates. Will send for culture and treat for UTI if indicated. Advised " to follow up with urology if worsening or continues. Suspect related to recent sexual activity

## 2025-02-25 NOTE — TELEPHONE ENCOUNTER
----- Message from Ambature sent at 2/25/2025  8:53 AM CST -----  Contact: self  .Type: Orders RequestWhat orders/ testing are being requested? Urine Is there a future appointment scheduled for the patient with PCP? Yes When?02/27Would you prefer a response via Posto7? Call back, .[phone] (home) Comments:pt states he think he has an UTI and would like to have a urinalysis done.

## 2025-02-25 NOTE — TELEPHONE ENCOUNTER
Spoke with the patient stated that he thinks he need a urine sample. The patient has an appointment today with Dr Reyna at 3:20 pm. The patient was informed that at his appointment visit today he will be evaluated for his symptoms and then Dr Reyna will discuss treatment option which will include any lab orders. The patient stated that he cannot wait to complete a urine test for later today. The patient was informed about the [facility] for evaluation. Patient stated that he might go to an urgent care clinic.

## 2025-02-27 LAB — BACTERIA UR CULT: NORMAL

## 2025-04-23 ENCOUNTER — OFFICE VISIT (OUTPATIENT)
Dept: DERMATOLOGY | Facility: CLINIC | Age: 70
End: 2025-04-23
Payer: MEDICARE

## 2025-04-23 DIAGNOSIS — L57.0 ACTINIC KERATOSES: ICD-10-CM

## 2025-04-23 DIAGNOSIS — L90.5 SCAR CONDITIONS/SKIN FIBROSIS: Primary | ICD-10-CM

## 2025-04-23 DIAGNOSIS — L82.1 SEBORRHEIC KERATOSIS: ICD-10-CM

## 2025-04-23 DIAGNOSIS — Z85.828 HISTORY OF SKIN CANCER: ICD-10-CM

## 2025-04-23 DIAGNOSIS — Z12.83 SCREENING, MALIGNANT NEOPLASM, SKIN: ICD-10-CM

## 2025-04-23 PROCEDURE — 3288F FALL RISK ASSESSMENT DOCD: CPT | Mod: CPTII,S$GLB,, | Performed by: DERMATOLOGY

## 2025-04-23 PROCEDURE — 1160F RVW MEDS BY RX/DR IN RCRD: CPT | Mod: CPTII,S$GLB,, | Performed by: DERMATOLOGY

## 2025-04-23 PROCEDURE — 99999 PR PBB SHADOW E&M-EST. PATIENT-LVL III: CPT | Mod: PBBFAC,,, | Performed by: DERMATOLOGY

## 2025-04-23 PROCEDURE — 1101F PT FALLS ASSESS-DOCD LE1/YR: CPT | Mod: CPTII,S$GLB,, | Performed by: DERMATOLOGY

## 2025-04-23 PROCEDURE — 4010F ACE/ARB THERAPY RXD/TAKEN: CPT | Mod: CPTII,S$GLB,, | Performed by: DERMATOLOGY

## 2025-04-23 PROCEDURE — 99213 OFFICE O/P EST LOW 20 MIN: CPT | Mod: 25,S$GLB,, | Performed by: DERMATOLOGY

## 2025-04-23 PROCEDURE — 1126F AMNT PAIN NOTED NONE PRSNT: CPT | Mod: CPTII,S$GLB,, | Performed by: DERMATOLOGY

## 2025-04-23 PROCEDURE — 17000 DESTRUCT PREMALG LESION: CPT | Mod: S$GLB,,, | Performed by: DERMATOLOGY

## 2025-04-23 PROCEDURE — 1159F MED LIST DOCD IN RCRD: CPT | Mod: CPTII,S$GLB,, | Performed by: DERMATOLOGY

## 2025-04-23 RX ORDER — CEFUROXIME AXETIL 250 MG/1
250 TABLET ORAL EVERY 12 HOURS
COMMUNITY
Start: 2025-04-17

## 2025-04-23 NOTE — PROGRESS NOTES
Subjective:       Patient ID:  Kike Smith is a 69 y.o. male who presents for   Chief Complaint   Patient presents with    Skin Check     Fbse. Hx of skin cancer.  Denies any new concerns.      Hx of SCC of the right forearm (s/p Mohs by Dr. 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  Denies bleeding, tender, growing, or concerning lesions. Here for skin check.         This is a high risk patient here to check for the development of new lesions.       Review of Systems   Constitutional:  Negative for fever and chills.   Gastrointestinal:  Negative for nausea and vomiting.   Skin:  Positive for activity-related sunscreen use. Negative for daily sunscreen use and recent sunburn.   Hematologic/Lymphatic: Does not bruise/bleed easily.        Objective:    Physical Exam   Constitutional: He appears well-developed and well-nourished. No distress.   Neurological: He is alert and oriented to person, place, and time. He is not disoriented.   Psychiatric: He has a normal mood and affect.   Skin:   Areas Examined (abnormalities noted in diagram):   Scalp / Hair Palpated and Inspected  Head / Face Inspection Performed  Neck Inspection Performed  Chest / Axilla Inspection Performed  Abdomen Inspection Performed  Back Inspection Performed  RUE Inspected  LUE Inspection Performed  RLE Inspected  LLE Inspection Performed  Nails and Digits Inspection Performed                   Diagram Legend     Erythematous scaling macule/papule c/w actinic keratosis       Vascular papule c/w angioma      Pigmented verrucoid papule/plaque c/w seborrheic keratosis      Yellow umbilicated papule c/w sebaceous hyperplasia      Irregularly shaped tan macule c/w lentigo     1-2 mm smooth white papules consistent with Milia      Movable subcutaneous cyst with punctum c/w epidermal inclusion cyst      Subcutaneous movable cyst c/w pilar  cyst      Firm pink to brown papule c/w dermatofibroma      Pedunculated fleshy papule(s) c/w skin tag(s)      Evenly pigmented macule c/w junctional nevus     Mildly variegated pigmented, slightly irregular-bordered macule c/w mildly atypical nevus      Flesh colored to evenly pigmented papule c/w intradermal nevus       Pink pearly papule/plaque c/w basal cell carcinoma      Erythematous hyperkeratotic cursted plaque c/w SCC      Surgical scar with no sign of skin cancer recurrence      Open and closed comedones      Inflammatory papules and pustules      Verrucoid papule consistent consistent with wart     Erythematous eczematous patches and plaques     Dystrophic onycholytic nail with subungual debris c/w onychomycosis     Umbilicated papule    Erythematous-base heme-crusted tan verrucoid plaque consistent with inflamed seborrheic keratosis     Erythematous Silvery Scaling Plaque c/w Psoriasis     See annotation    Assessment / Plan:        Scar conditions/skin fibrosis  Screening, malignant neoplasm, skin  History of skin cancer  Scar of the right forearm, hx of NMSC.  No evidence of recurrence on physical exam today.  Continue routine skin surveillance. Daily sunscreen advised.    Area(s) of previous NMSC evaluated with no signs of recurrence.    full body skin examination performed today including at least 12 points as noted in physical examination. No lesions suspicious for malignancy noted.    Seborrheic keratosis  Reassurance given. Discussed diagnosis and that lesions are benign.  AAD handout given.     Actinic keratoses  Cryosurgery Procedure Note    The patient is informed of the precancerous quality and need for treatment of these lesions. After risks, benefits and alternatives explained, including blistering, pain, hyper- and hypopigmentation, patient verbally consents to cryotherapy to precancerous lesions. Liquid nitrogen cryosurgery is applied to the 1 actinic keratoses, as detailed in the physical  exam, to produce a freeze injury. The patient is aware that blisters may form and is instructed on wound care with gentle cleansing and use of vaseline ointment to keep moist until healed. The patient is supplied a handout on cryosurgery and is instructed to call if lesions do not completely resolve.             Follow up in about 1 year (around 4/23/2026).

## 2025-04-23 NOTE — PATIENT INSTRUCTIONS

## 2025-06-15 DIAGNOSIS — I10 PRIMARY HYPERTENSION: ICD-10-CM

## 2025-06-15 NOTE — TELEPHONE ENCOUNTER
Refill Routing Note   Medication(s) are not appropriate for processing by [facility] for the following reason(s):        Required vitals abnormal  Required labs outdated    [facility] action(s):  Defer   Requires appointment : Yes     Requires labs : Yes             Appointments  past 12m or future 3m with PCP    Date Provider   Last Visit   6/10/2024 Anmol Reyna MD   Next Visit   Visit date not found Anmol Reyna MD   ED visits in past 90 days: 0        Note composed:6:58 PM 06/15/2025

## 2025-06-15 NOTE — TELEPHONE ENCOUNTER
Care Due:                  Date            Visit Type   Department     Provider  --------------------------------------------------------------------------------                                EP -                              PRIMARY      [facility] INTERNAL  Last Visit: 06-      [facility]   MEDICINE       Anmol Reyna  Next Visit: None Scheduled  None         None Found                                                            Last  Test          Frequency    Reason                     Performed    Due Date  --------------------------------------------------------------------------------    Office Visit  15 months..  hydroCHLOROthiazide,       06-   09-                             pravastatin, valsartan...    CMP.........  12 months..  hydroCHLOROthiazide,       06- 06-                             pravastatin, valsartan...    Lipid Panel.  12 months..  pravastatin..............  06- 06-    [facility] Embedded Care Due Messages. Reference number: [id number].   6/15/2025 9:01:03 AM CDT

## 2025-06-16 RX ORDER — PRAVASTATIN SODIUM 40 MG/1
40 TABLET ORAL
Qty: 90 TABLET | Refills: 1 | Status: SHIPPED | OUTPATIENT
Start: 2025-06-16

## 2025-06-16 RX ORDER — HYDROCHLOROTHIAZIDE 12.5 MG/1
TABLET ORAL
Qty: 90 TABLET | Refills: 1 | Status: SHIPPED | OUTPATIENT
Start: 2025-06-16

## 2025-06-16 NOTE — TELEPHONE ENCOUNTER
Medicine approved for one REFILL. Needs follow up appointment with me or ADAM in person or virtually

## 2025-06-30 ENCOUNTER — OFFICE VISIT (OUTPATIENT)
Dept: INTERNAL MEDICINE | Facility: CLINIC | Age: 70
End: 2025-06-30
Payer: MEDICARE

## 2025-06-30 VITALS
BODY MASS INDEX: 30.96 KG/M2 | HEART RATE: 72 BPM | WEIGHT: 216.25 LBS | HEIGHT: 70 IN | SYSTOLIC BLOOD PRESSURE: 122 MMHG | DIASTOLIC BLOOD PRESSURE: 82 MMHG | TEMPERATURE: 97 F | OXYGEN SATURATION: 95 %

## 2025-06-30 DIAGNOSIS — I10 PRIMARY HYPERTENSION: ICD-10-CM

## 2025-06-30 DIAGNOSIS — Z85.46 HISTORY OF PROSTATE CANCER: ICD-10-CM

## 2025-06-30 DIAGNOSIS — E78.5 HYPERLIPIDEMIA, UNSPECIFIED HYPERLIPIDEMIA TYPE: Chronic | ICD-10-CM

## 2025-06-30 DIAGNOSIS — G47.33 OSA ON CPAP: ICD-10-CM

## 2025-06-30 DIAGNOSIS — E66.811 OBESITY (BMI 30.0-34.9): ICD-10-CM

## 2025-06-30 DIAGNOSIS — D17.9 LIPOMA, UNSPECIFIED SITE: ICD-10-CM

## 2025-06-30 DIAGNOSIS — Z00.00 ROUTINE HEALTH MAINTENANCE: Primary | ICD-10-CM

## 2025-06-30 DIAGNOSIS — N28.1 RENAL CYST: ICD-10-CM

## 2025-06-30 PROBLEM — Z12.11 COLON CANCER SCREENING: Status: RESOLVED | Noted: 2021-09-14 | Resolved: 2025-06-30

## 2025-06-30 PROBLEM — D49.0 IPMN (INTRADUCTAL PAPILLARY MUCINOUS NEOPLASM): Status: RESOLVED | Noted: 2021-09-14 | Resolved: 2025-06-30

## 2025-06-30 PROCEDURE — 99999 PR PBB SHADOW E&M-EST. PATIENT-LVL IV: CPT | Mod: PBBFAC,,, | Performed by: FAMILY MEDICINE

## 2025-06-30 PROCEDURE — 1101F PT FALLS ASSESS-DOCD LE1/YR: CPT | Mod: CPTII,S$GLB,, | Performed by: FAMILY MEDICINE

## 2025-06-30 PROCEDURE — 4010F ACE/ARB THERAPY RXD/TAKEN: CPT | Mod: CPTII,S$GLB,, | Performed by: FAMILY MEDICINE

## 2025-06-30 PROCEDURE — 3079F DIAST BP 80-89 MM HG: CPT | Mod: CPTII,S$GLB,, | Performed by: FAMILY MEDICINE

## 2025-06-30 PROCEDURE — 3288F FALL RISK ASSESSMENT DOCD: CPT | Mod: CPTII,S$GLB,, | Performed by: FAMILY MEDICINE

## 2025-06-30 PROCEDURE — 1126F AMNT PAIN NOTED NONE PRSNT: CPT | Mod: CPTII,S$GLB,, | Performed by: FAMILY MEDICINE

## 2025-06-30 PROCEDURE — 1159F MED LIST DOCD IN RCRD: CPT | Mod: CPTII,S$GLB,, | Performed by: FAMILY MEDICINE

## 2025-06-30 PROCEDURE — 99397 PER PM REEVAL EST PAT 65+ YR: CPT | Mod: S$GLB,,, | Performed by: FAMILY MEDICINE

## 2025-06-30 PROCEDURE — 3008F BODY MASS INDEX DOCD: CPT | Mod: CPTII,S$GLB,, | Performed by: FAMILY MEDICINE

## 2025-06-30 PROCEDURE — 3074F SYST BP LT 130 MM HG: CPT | Mod: CPTII,S$GLB,, | Performed by: FAMILY MEDICINE

## 2025-06-30 NOTE — PROGRESS NOTES
Subjective:       Patient ID: Kike Smith is a  male.    Chief Complaint: Annual Exam    HPIhere for phys exam no c/o;  htn controlled and nl bps;baudilio statin; baudilio statin;ll. ;digital med bps fine;at least 10k     fam hx bladder ca/hematuria nl w./u(brother bladder ca /smoker)renal cyst : nl hmaturia w/u yrs ago.     Prost ca/Renal cysts  utd urol      IPMN: Pancreatic lesion MRCP 9/24 likely lipoma      Review of Systems  Cardiovascular: no chest pain  Chest: no shortness of breath  Abd: no abd pain  Remainder review of systems negative    Objective:      Physical Exam   Constitutional: He is oriented to person, place, and time. He appears well-developed and well-nourished.   HENT:   Head: Normocephalic and atraumatic.   Right Ear: External ear normal.   Left Ear: External ear normal.   Nose: Nose normal.   Mouth/Throat: Oropharynx is clear and moist.   Nl tms   Eyes: Conjunctivae and EOM are normal. Pupils are equal, round, and reactive to light. No scleral icterus.   Neck: Normal range of motion. Neck supple. Carotid bruit is not present.   Cardiovascular: Normal rate, regular rhythm and normal heart sounds.  Exam reveals no gallop and no friction rub.    No murmur heard.  Pulmonary/Chest: Effort normal and breath sounds normal. He has no wheezes.   Abdominal: Soft. Bowel sounds are normal. He exhibits no distension and no mass. There is no hepatosplenomegaly. There is no tenderness. There is no rebound and no guarding.   Musculoskeletal: Normal range of motion. He exhibits no tenderness.   Lymphadenopathy:     He has no cervical adenopathy.   Neurological: He is alert and oriented to person, place, and time. No cranial nerve deficit. Coordination normal.   Skin: Skin is warm and dry. No rash noted. No erythema.   Psychiatric: He has a normal mood and affect. His behavior is normal. Judgment and thought content normal.   Nursing note and vitals reviewed.    Gu no hernia    Assessment:       1. Routine health  maintenance    htn  Hx prost ca    Panc. lipoma  Renal cysts  Plan:       urol when due;hx prott ca and rnal cysts    He will also check w dr noble on any surv on renal cysts  Lab tsoon  Lab 12 months and follow up after        Panc lipoma. msg to [facility] on whether any f/u needed on this     Routine health maintenance    History of prostate cancer    Primary hypertension  -     Lipid Panel; Future; Expected date: 06/30/2025  -     Comprehensive Metabolic Panel; Future; Expected date: 06/30/2025  -     Comprehensive Metabolic Panel; Future; Expected date: 06/30/2026  -     Lipid Panel; Future; Expected date: 06/30/2026    Renal cyst    Obesity (BMI 30.0-34.9)    Hyperlipidemia, unspecified hyperlipidemia type    Lipoma, unspecified site    MERRITT on CPAP

## 2025-06-30 NOTE — Clinical Note
Mirian His pancreatic lesion seems to be very likely lipoma.I presume he does not need anymore follow up this?

## 2025-07-01 ENCOUNTER — LAB VISIT (OUTPATIENT)
Dept: LAB | Facility: HOSPITAL | Age: 70
End: 2025-07-01
Attending: FAMILY MEDICINE
Payer: MEDICARE

## 2025-07-01 DIAGNOSIS — I10 PRIMARY HYPERTENSION: ICD-10-CM

## 2025-07-01 LAB
ALBUMIN SERPL BCP-MCNC: 4 G/DL (ref 3.5–5.2)
ALP SERPL-CCNC: 75 UNIT/L (ref 40–150)
ALT SERPL W/O P-5'-P-CCNC: 20 UNIT/L (ref 10–44)
ANION GAP (OHS): 6 MMOL/L (ref 8–16)
AST SERPL-CCNC: 20 UNIT/L (ref 11–45)
BILIRUB SERPL-MCNC: 0.7 MG/DL (ref 0.1–1)
BUN SERPL-MCNC: 19 MG/DL (ref 8–23)
CALCIUM SERPL-MCNC: 9.1 MG/DL (ref 8.7–10.5)
CHLORIDE SERPL-SCNC: 103 MMOL/L (ref 95–110)
CHOLEST SERPL-MCNC: 155 MG/DL (ref 120–199)
CHOLEST/HDLC SERPL: 4.8 {RATIO} (ref 2–5)
CO2 SERPL-SCNC: 29 MMOL/L (ref 23–29)
CREAT SERPL-MCNC: 1 MG/DL (ref 0.5–1.4)
GFR SERPLBLD CREATININE-BSD FMLA CKD-EPI: >60 ML/MIN/1.73/M2
GLUCOSE SERPL-MCNC: 88 MG/DL (ref 70–110)
HDLC SERPL-MCNC: 32 MG/DL (ref 40–75)
HDLC SERPL: 20.6 % (ref 20–50)
LDLC SERPL CALC-MCNC: 106.4 MG/DL (ref 63–159)
NONHDLC SERPL-MCNC: 123 MG/DL
POTASSIUM SERPL-SCNC: 4.1 MMOL/L (ref 3.5–5.1)
PROT SERPL-MCNC: 7.7 GM/DL (ref 6–8.4)
SODIUM SERPL-SCNC: 138 MMOL/L (ref 136–145)
TRIGL SERPL-MCNC: 83 MG/DL (ref 30–150)

## 2025-07-01 PROCEDURE — 82947 ASSAY GLUCOSE BLOOD QUANT: CPT

## 2025-07-01 PROCEDURE — 36415 COLL VENOUS BLD VENIPUNCTURE: CPT

## 2025-07-01 PROCEDURE — 83718 ASSAY OF LIPOPROTEIN: CPT

## 2025-07-01 PROCEDURE — 82247 BILIRUBIN TOTAL: CPT

## 2025-07-01 RX ORDER — VALSARTAN 160 MG/1
TABLET ORAL
Qty: 90 TABLET | Refills: 4 | Status: SHIPPED | OUTPATIENT
Start: 2025-07-01

## 2025-07-01 NOTE — TELEPHONE ENCOUNTER
No care due was identified.  [facility] Embedded Care Due Messages. Reference number: [id number].   7/01/2025 6:44:16 AM CDT

## 2025-07-01 NOTE — TELEPHONE ENCOUNTER
Refill Routing Note   Medication(s) are not appropriate for processing by [facility] for the following reason(s):        Required labs outdated-K+    [facility] action(s):  Defer               Appointments  past 12m or future 3m with PCP    Date Provider   Last Visit   6/30/2025 Anmol Reyna MD   Next Visit   Visit date not found Anmol Reyna MD   ED visits in past 90 days: 0        Note composed:9:43 AM 07/01/2025

## 2025-07-08 ENCOUNTER — TELEPHONE (OUTPATIENT)
Dept: UROLOGY | Facility: CLINIC | Age: 70
End: 2025-07-08
Payer: MEDICARE

## 2025-07-08 ENCOUNTER — PATIENT MESSAGE (OUTPATIENT)
Dept: UROLOGY | Facility: CLINIC | Age: 70
End: 2025-07-08
Payer: MEDICARE

## 2025-07-09 ENCOUNTER — TELEPHONE (OUTPATIENT)
Dept: UROLOGY | Facility: CLINIC | Age: 70
End: 2025-07-09
Payer: MEDICARE

## 2025-07-09 NOTE — PROGRESS NOTES
Clinic Note  7/9/2025      Subjective:         Chief Complaint:   HPI  Kike Smith is a 69 y.o. male with [id number] prostate cancer. Consult from Dr Darling.  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  Potent and continent prior to surgery. RALP (robotic assisted laparoscopic prostatectomy) 9/20/2021- path showed 1+ LN. [id number], Lizette 4+3(GG3).  Reviewed path at last visit. Discussed N1 disease. Discussed observation, ADT +/- EBRT. Patient interested in pursuing surveillance.  Has MICHAELA, using 4-5 minipads/day. Doing kegel exercises 4x10. Symptoms worsened by HCTZ, will discuss with PCP.  Has ED, sees Dr. Chen. Continuing cialis.     FH -negative  PSA - 5.7  Stage - T1C  Volume - 17 ccs TRUS  MRI - n/a  Biopsy - 7/19/2021- Lizette 4+3 left apex 1/2 40%; Lizette 3+4 left base 1/2 20%, right apex 2/2 40%, right middle 1/2 30%; Lizette 6 2/2 30%. Overall 7/12 cores positive.  Bone scan-negative  CT scan-negative  MOON Score - 5 intermediate risk  NCCN - Unfavorable intermediate  Germline testing - not indicated     3/18/2022- PSA 0.01  Has been working with PT. Continence improved, only leaks with cough.  Stopped Cialis due to GERD.     8/5/2022- PSA 0.01-6/21/2022. Has been working on hypertension control.     2/3/2023- PSA <0.01.     8/4/2023- PSA <0.01. 2 years out from RALP (robotic assisted laparoscopic prostatectomy) with N1 GG3 disease.  Overall doing well. Had recent normal stress test and colonoscopy.  Continent but still uses security pad.  Son and DIL live in [place]. Work remotely for [employer].     2/16/2024-PSA 0.01. Leaves for [place] in 2 weeks.Here today with his wife Martha.      8/15/2024- PSA- <0.01. Doing well 3 years out from RALP (robotic assisted laparoscopic prostatectomy) with N1 disease.  Just back from [place], day 10 Covid.     2/7/2025- PSA-<0.01.    7/10/2025-PSA 0.01. Slight erythema on penile shaft.    Lab Results   Component Value Date    PSA 0.01 07/10/2025     PSA 0.01 06/21/2022    PSA 5.5 (H) 06/18/2021    PSA 4.1 (H) 07/21/2020    PSA 3.9 03/19/2019    PSA 3.8 03/02/2018    PSA 3.7 09/20/2017    PSA 3.7 02/20/2017    PSA 2.6 08/23/2016    PSA 3.3 12/29/2015    PSADIAG <0.01 02/06/2025    PSADIAG <0.01 08/15/2024    PSADIAG 0.01 02/15/2024    PSADIAG <0.01 08/03/2023    PSADIAG <0.01 02/02/2023    PSADIAG 0.01 03/14/2022    PSADIAG 0.01 11/02/2021    PSADIAG 5.7 (H) 07/12/2021         Past Medical History:   Diagnosis Date    Allergy     BPH (benign prostatic hyperplasia)     History of colon polyps 01/25/2018    He has had colon polyps since he was in his 30's.  He has had multiple colonoscopies and has had adenomatous polyps.    History of fracture 09/14/2021    History of inguinal hernia repair 09/14/2021    Hyperlipidemia     Hypertension     Inguinal hernia     IPMN (intraductal papillary mucinous neoplasm)     annual eus or mri    Obesity     MERRITT on CPAP     via ent dr wick 2022    Prostate cancer     Renal cyst     Squamous cell carcinoma skin of arm      r arm;s/p mohs     Family History   Problem Relation Name Age of Onset    Macular degeneration Maternal Uncle      Bladder Cancer Brother          smoker    Cataracts Mother      Fanconi anemia Mother      Leukemia Father  80    Coronary artery disease Father      Heart attack Father  72    Multiple myeloma Sister  59     Social History[1]  Past Surgical History:   Procedure Laterality Date    CLOSURE OF DEFECT OF MOHS PROCEDURE  07/10/2020    dr reynoso    COLONOSCOPY N/A 1/25/2018    Procedure: COLONOSCOPY;  Surgeon: Juan A Walter MD;  Location[facility] ENDO;  Service: Endoscopy;  Laterality: N/A;    COLONOSCOPY N/A 7/12/2023    Procedure: COLONOSCOPY;  Surgeon: Danielle Aguila MD;  [facility] ENDO;  Service: Endoscopy;  Laterality: N/A;    ENDOSCOPIC ULTRASOUND OF UPPER GASTROINTESTINAL TRACT N/A 1/24/2022    Procedure: ULTRASOUND, ENDOSCOPIC, UPPER GI TRACT;  Surgeon: Daya Espinal MD;  " Location: [facility];  Service: Endoscopy;  Laterality: N/A;    KNEE ARTHROSCOPY Left     ORIF TIBIA & FIBULA FRACTURES Left 08/10/2019    tib/fib fx    ROBOT-ASSISTED LAPAROSCOPIC PROSTATECTOMY USING DA ALEX XI N/A 9/20/2021    Procedure: XI ROBOTIC PROSTATECTOMY;  Surgeon: Fidencio Morton MD;  Location: [address];  Service: Urology;  Laterality: N/A;  3hr gen with regional    ROBOT-ASSISTED LAPAROSCOPIC REPAIR OF INGUINAL HERNIA USING DA ALEX XI Left 8/14/2020    Procedure: XI ROBOTIC REPAIR, HERNIA, INGUINAL;  Surgeon: Mark Anthony Araujo MD;  Location: [facility];  Service: General;  Laterality: Left;    ROBOT-ASSISTED LYMPHADENECTOMY N/A 9/20/2021    Procedure: ROBOTIC LYMPHADENECTOMY;  Surgeon: Fidencio Morton MD;  [facility];  Service: Urology;  Laterality: N/A;     Problem List[2]  Review of Systems   Constitutional:  Negative for appetite change, chills, fatigue, fever and unexpected weight change.   HENT:  Negative for nosebleeds.    Respiratory:  Negative for shortness of breath and wheezing.    Cardiovascular:  Negative for chest pain, palpitations and leg swelling.   Gastrointestinal:  Negative for abdominal distention, abdominal pain, constipation, diarrhea, nausea and vomiting.   Musculoskeletal:  Negative for arthralgias and back pain.   Skin:  Negative for pallor.   Neurological:  Negative for dizziness, seizures and syncope.   Hematological:  Negative for adenopathy.   Psychiatric/Behavioral:  Negative for dysphoric mood.        Objective:      There were no vitals taken for this visit.  Estimated body mass index is 31.03 kg/m² as calculated from the following:    Height as of 6/30/25: 5' 10" (1.778 m).    Weight as of 6/30/25: 98.1 kg (216 lb 4.3 oz).  Physical Exam  Genitourinary:     Penis: Circumcised.       Comments: Slight erythema c/w moisture reaction      Assessment and Plan:   A&D ointment  1 year with PSA  Renal cysts- simple cysts need no F/U.        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      Lost 35 y/o son to drug overdose summer 16     Social Drivers of Health     Financial Resource Strain: Low Risk  (8/8/2024)    Overall Financial Resource Strain (CARDIA)     Difficulty of Paying Living Expenses: Not hard at all   Food Insecurity: No Food Insecurity (8/8/2024)    Hunger Vital Sign     Worried About Running Out of Food in the Last Year: Never true     Ran Out of Food in the Last Year: Never true   Transportation Needs: No Transportation Needs (1/9/2022)    PRAPARE - Transportation     Lack of Transportation (Medical): No     Lack of Transportation (Non-Medical): No   Physical Activity: Sufficiently Active (8/8/2024)    Exercise Vital Sign     Days of Exercise per Week: 7 days     Minutes of Exercise per Session: 30 min   Stress: No Stress Concern Present (8/8/2024)    [employer] [place] [employer] - Occupational Stress Questionnaire     Feeling of Stress : Not at all   Housing Stability: Low Risk  (1/9/2022)    Housing Stability Vital Sign     Unable to Pay for Housing in the Last Year: No     Number of Places Lived in the Last Year: 1     Unstable Housing in the Last Year: No   [2]   Patient Active Problem List  Diagnosis    Hypertension    Epididymal cyst    Hyperlipidemia    Renal cyst    Malignant neoplasm of prostate    Allergies    Overweight    Abnormal EKG    Snoring    Edema    Erectile dysfunction following radical prostatectomy    Decreased strength,  endurance, and mobility    Urine incontinence    Pelvic floor weakness, male    Lipoma    MERRITT on CPAP    History of prostate cancer

## 2025-07-10 ENCOUNTER — LAB VISIT (OUTPATIENT)
Dept: LAB | Facility: HOSPITAL | Age: 70
End: 2025-07-10
Attending: UROLOGY
Payer: MEDICARE

## 2025-07-10 ENCOUNTER — OFFICE VISIT (OUTPATIENT)
Dept: UROLOGY | Facility: CLINIC | Age: 70
End: 2025-07-10
Payer: MEDICARE

## 2025-07-10 VITALS
DIASTOLIC BLOOD PRESSURE: 90 MMHG | SYSTOLIC BLOOD PRESSURE: 144 MMHG | HEART RATE: 69 BPM | BODY MASS INDEX: 31.02 KG/M2 | HEIGHT: 70 IN | WEIGHT: 216.69 LBS

## 2025-07-10 DIAGNOSIS — C61 MALIGNANT NEOPLASM OF PROSTATE: Primary | ICD-10-CM

## 2025-07-10 DIAGNOSIS — C61 MALIGNANT NEOPLASM OF PROSTATE: ICD-10-CM

## 2025-07-10 LAB — PSA SERPL-MCNC: 0.01 NG/ML

## 2025-07-10 PROCEDURE — 84153 ASSAY OF PSA TOTAL: CPT

## 2025-07-10 PROCEDURE — G2211 COMPLEX E/M VISIT ADD ON: HCPCS | Mod: S$GLB,,, | Performed by: UROLOGY

## 2025-07-10 PROCEDURE — 99999 PR PBB SHADOW E&M-EST. PATIENT-LVL III: CPT | Mod: PBBFAC,,, | Performed by: UROLOGY

## 2025-07-10 PROCEDURE — 99214 OFFICE O/P EST MOD 30 MIN: CPT | Mod: S$GLB,,, | Performed by: UROLOGY

## 2025-07-10 PROCEDURE — 3288F FALL RISK ASSESSMENT DOCD: CPT | Mod: CPTII,S$GLB,, | Performed by: UROLOGY

## 2025-07-10 PROCEDURE — 3080F DIAST BP >= 90 MM HG: CPT | Mod: CPTII,S$GLB,, | Performed by: UROLOGY

## 2025-07-10 PROCEDURE — 3077F SYST BP >= 140 MM HG: CPT | Mod: CPTII,S$GLB,, | Performed by: UROLOGY

## 2025-07-10 PROCEDURE — 1101F PT FALLS ASSESS-DOCD LE1/YR: CPT | Mod: CPTII,S$GLB,, | Performed by: UROLOGY

## 2025-07-10 PROCEDURE — 1159F MED LIST DOCD IN RCRD: CPT | Mod: CPTII,S$GLB,, | Performed by: UROLOGY

## 2025-07-10 PROCEDURE — 36415 COLL VENOUS BLD VENIPUNCTURE: CPT

## 2025-07-10 PROCEDURE — 4010F ACE/ARB THERAPY RXD/TAKEN: CPT | Mod: CPTII,S$GLB,, | Performed by: UROLOGY

## 2025-07-10 PROCEDURE — 3008F BODY MASS INDEX DOCD: CPT | Mod: CPTII,S$GLB,, | Performed by: UROLOGY

## 2025-07-10 PROCEDURE — 1125F AMNT PAIN NOTED PAIN PRSNT: CPT | Mod: CPTII,S$GLB,, | Performed by: UROLOGY

## 2025-07-10 NOTE — LETTER
July 10, 2025        Anmol Reyna MD  [address]  [place] [address]             [facility] - Urology 2nd Fl  [address]  [facility] [address]  Phone: [phone]   Patient: Kike Smith   MR Number: [id number]   YOB: 1955   Date of Visit: 7/10/2025       Dear Dr. Reyna:    Thank you for referring Kike Smith to me for evaluation. Attached you will find relevant portions of my assessment and plan of care.    If you have questions, please do not hesitate to call me. I look forward to following Kike Smith along with you.    Sincerely,      Fidencio Morton MD            CC  No Recipients    Enclosure     
Abdomen soft, non-tender, no guarding.

## 2025-07-11 ENCOUNTER — OFFICE VISIT (OUTPATIENT)
Dept: OPHTHALMOLOGY | Facility: CLINIC | Age: 70
End: 2025-07-11
Payer: MEDICARE

## 2025-07-11 DIAGNOSIS — H25.011 CORTICAL AGE-RELATED CATARACT OF RIGHT EYE: ICD-10-CM

## 2025-07-11 DIAGNOSIS — H52.13 MYOPIA WITH ASTIGMATISM AND PRESBYOPIA, BILATERAL: ICD-10-CM

## 2025-07-11 DIAGNOSIS — H52.203 MYOPIA WITH ASTIGMATISM AND PRESBYOPIA, BILATERAL: ICD-10-CM

## 2025-07-11 DIAGNOSIS — H25.13 NUCLEAR SCLEROSIS, BILATERAL: Primary | ICD-10-CM

## 2025-07-11 DIAGNOSIS — H52.4 MYOPIA WITH ASTIGMATISM AND PRESBYOPIA, BILATERAL: ICD-10-CM

## 2025-07-11 PROCEDURE — 99999 PR PBB SHADOW E&M-EST. PATIENT-LVL III: CPT | Mod: PBBFAC,,, | Performed by: OPTOMETRIST

## 2025-07-11 NOTE — PROGRESS NOTES
HPI     Annual Exam            Comments: Pt presents for an annual exam.    VA declining, but gls from 2021. Distance > near  Longstanding/ stable floaters.   Denies flashes/ headaches/ pains.          Last edited by Stephy Smith on 7/11/2025  7:47 AM.            Assessment /Plan     For exam results, see Encounter Report.    Nuclear sclerosis, bilateral  Cortical age-related cataract of right eye  Cataracts not significantly affecting activities of daily living and therefore surgery is not indicated at this time.   Will continue to monitor over the next 12 months. Pt to call or RTC with any significant change in vision prior to next visit.     Myopia with astigmatism and presbyopia, bilateral  Eyeglass Final Rx       Eyeglass Final Rx         Sphere Cylinder Axis Add    Right -1.75 +3.25 020 +2.50    Left -1.50 +3.25 160 +2.50      Expiration Date: 7/11/2026                  Normal gOCT today with nice, symmetric GCL OU      RTC 1 yr for dilated eye exam or PRN if any problems.   Discussed above and answered questions.

## 2025-07-23 NOTE — TELEPHONE ENCOUNTER
No care due was identified.  [facility] Embedded Care Due Messages. Reference number: [id number].   7/23/2025 6:03:03 PM CDT

## 2025-07-24 RX ORDER — AMLODIPINE BESYLATE 5 MG/1
5 TABLET ORAL
Qty: 90 TABLET | Refills: 3 | OUTPATIENT
Start: 2025-07-24

## 2025-07-24 NOTE — TELEPHONE ENCOUNTER
Refill Routing Note   Medication(s) are not appropriate for processing by [facility] for the following reason(s):        Required vitals abnormal    [facility] action(s):  Defer               Appointments  past 12m or future 3m with PCP    Date Provider   Last Visit   6/30/2025 Anmol Reyna MD   Next Visit   Visit date not found Anmol Reyna MD   ED visits in past 90 days: 0        Note composed:8:45 AM 07/24/2025

## 2025-07-25 NOTE — TELEPHONE ENCOUNTER
Provider Staff:  Please note Refusal of medication.     Action required for this patient.      Requested Prescriptions     Refused Prescriptions Disp Refills    amLODIPine (NORVASC) 5 MG tablet [Pharmacy Med Name: AMLODIPINE BESYLATE 5MG TABLETS] 90 tablet 3     Sig: TAKE 1 TABLET(5 MG) BY MOUTH EVERY DAY     Refused By: KASEY ESPINOZA     Reason for Refusal: Patient needs an appointment      Thanks!  [facility]   Note composed: 07/24/2025 7:13 PM

## (undated) DEVICE — LUBRICANT SURGILUBE 2 OZ

## (undated) DEVICE — GLOVE SURGEONS ULTRA TOUCH 6.5

## (undated) DEVICE — SUT STRATAFIX 2-0 30CM

## (undated) DEVICE — SOL NS 1000CC

## (undated) DEVICE — SEE MEDLINE ITEM 152622

## (undated) DEVICE — SUPPORT ULNA NERVE PROTECTOR

## (undated) DEVICE — IRRIGATOR ENDOSCOPY DISP.

## (undated) DEVICE — NDL PNEUMO INSUFFLATI 120MM

## (undated) DEVICE — GOWN SURGICAL X-LARGE

## (undated) DEVICE — BAG TISS RETRV MONARCH 10MM

## (undated) DEVICE — TRAY MINOR GEN SURG

## (undated) DEVICE — SOL CLEARIFY VISUALIZATION LAP

## (undated) DEVICE — SUT PDS II 0 CT-1 VIL MONO

## (undated) DEVICE — SUT MONOCRYL 4.0 PS2 CP496G

## (undated) DEVICE — DRAPE COLUMN DAVINCI XI

## (undated) DEVICE — SUT MCRYL PLUS 4-0 PS2 27IN

## (undated) DEVICE — COVER TIP CURVED SCISSORS XI

## (undated) DEVICE — DRAPE ABDOMINAL TIBURON 14X11

## (undated) DEVICE — OBTURATOR BLADELESS 8MM XI CLR

## (undated) DEVICE — PORT AIRSEAL 12/120MM LPI

## (undated) DEVICE — SYR 3CC LUER LOC

## (undated) DEVICE — APPLICATOR CHLORAPREP ORN 26ML

## (undated) DEVICE — SUT ABS CLIP LAPRA-TY CTD

## (undated) DEVICE — SOL WATER STRL IRR 1000ML

## (undated) DEVICE — EVACUATOR KIT SMOKE PLUME AWAY

## (undated) DEVICE — COVER LIGHT HANDLE

## (undated) DEVICE — GLOVE SURGICAL LATEX SZ 7

## (undated) DEVICE — DRAPE SCOPE PILLOW WARMER

## (undated) DEVICE — TROCAR ENDOPATH XCEL 5MM 7.5CM

## (undated) DEVICE — SUT MONOCRYL 3-0 RB1

## (undated) DEVICE — ADHESIVE DERMABOND ADVANCED

## (undated) DEVICE — SEE MEDLINE ITEM 157027

## (undated) DEVICE — PAD PINK TRENDELENBURG POS XL

## (undated) DEVICE — SET TRI-LUMEN FILTERED TUBE

## (undated) DEVICE — DRAPE ARM DAVINCI XI

## (undated) DEVICE — SEAL UNIVERSAL 5MM-8MM XI

## (undated) DEVICE — COVER OVERHEAD SURG LT BLUE

## (undated) DEVICE — KIT ANTIFOG

## (undated) DEVICE — SUT 2/0 36IN COATED VICRYL

## (undated) DEVICE — UNDERGLOVES BIOGEL PI SZ 6 LF

## (undated) DEVICE — SEE MEDLINE ITEM 157117

## (undated) DEVICE — SCISSOR 5MMX35CM DIRECT DRIVE

## (undated) DEVICE — NDL SAFETY 22G X 1.5 ECLIPSE

## (undated) DEVICE — UNDERGLOVES BIOGEL PI SZ 7 LF

## (undated) DEVICE — CLIP HEMO-LOK MLX LARGE LF

## (undated) DEVICE — Device

## (undated) DEVICE — KIT WING PAD POSITIONING

## (undated) DEVICE — SUT MONOCRYL 3-0 UNDYED RB1

## (undated) DEVICE — SOL ELECTROLUBE ANTI-STIC

## (undated) DEVICE — CONTAINER SPECIMEN STRL 4OZ

## (undated) DEVICE — NDL INSUF ULTRA VERESS 120MM

## (undated) DEVICE — SYR ONLY LUER LOCK 20CC

## (undated) DEVICE — ELECTRODE REM PLYHSV RETURN 9

## (undated) DEVICE — CLIPPER BLADE MOD 4406 (CAREF)

## (undated) DEVICE — TUBING HEATED INSUFFLATOR

## (undated) DEVICE — TRAY FOLEY 16FR INFECTION CONT

## (undated) DEVICE — LEGGINGS 48X31 INCH

## (undated) DEVICE — SYR 50ML CATH TIP

## (undated) DEVICE — UNDERGLOVE BIOGEL PI SZ 6.5 LF

## (undated) DEVICE — UNDERGLOVES BIOGEL PI SIZE 8

## (undated) DEVICE — TAPE SILK 3IN